# Patient Record
Sex: FEMALE | Race: WHITE | NOT HISPANIC OR LATINO | URBAN - METROPOLITAN AREA
[De-identification: names, ages, dates, MRNs, and addresses within clinical notes are randomized per-mention and may not be internally consistent; named-entity substitution may affect disease eponyms.]

---

## 2017-01-24 ENCOUNTER — EMERGENCY (EMERGENCY)
Facility: HOSPITAL | Age: 82
LOS: 1 days | Discharge: HOME | End: 2017-01-24
Admitting: EMERGENCY MEDICINE
Payer: MEDICARE

## 2017-01-24 DIAGNOSIS — K62.89 OTHER SPECIFIED DISEASES OF ANUS AND RECTUM: ICD-10-CM

## 2017-01-24 LAB
APPEARANCE UR: ABNORMAL
BACTERIA # UR AUTO: ABNORMAL
BILIRUB UR-MCNC: NEGATIVE — SIGNIFICANT CHANGE UP
COLOR SPEC: YELLOW — SIGNIFICANT CHANGE UP
DIFF PNL FLD: NEGATIVE — SIGNIFICANT CHANGE UP
GLUCOSE UR QL: NEGATIVE MG/DL — SIGNIFICANT CHANGE UP
KETONES UR-MCNC: ABNORMAL
LEUKOCYTE ESTERASE UR-ACNC: ABNORMAL
NITRITE UR-MCNC: POSITIVE
PH UR: 5 — SIGNIFICANT CHANGE UP (ref 4.8–8)
PROT UR-MCNC: 30 MG/DL
RBC CASTS # UR COMP ASSIST: NEGATIVE /HPF — SIGNIFICANT CHANGE UP (ref 0–4)
SP GR SPEC: 1.03 — HIGH (ref 1.01–1.02)
UROBILINOGEN FLD QL: 1 MG/DL
WBC UR QL: SIGNIFICANT CHANGE UP

## 2017-01-24 PROCEDURE — 74020: CPT | Mod: 26

## 2017-01-24 PROCEDURE — 74020: CPT

## 2017-01-24 PROCEDURE — 99283 EMERGENCY DEPT VISIT LOW MDM: CPT

## 2017-01-24 PROCEDURE — 99283 EMERGENCY DEPT VISIT LOW MDM: CPT | Mod: 25

## 2017-01-24 PROCEDURE — 51702 INSERT TEMP BLADDER CATH: CPT

## 2017-01-24 PROCEDURE — 81001 URINALYSIS AUTO W/SCOPE: CPT

## 2017-02-08 ENCOUNTER — APPOINTMENT (OUTPATIENT)
Dept: SURGERY | Facility: CLINIC | Age: 82
End: 2017-02-08

## 2017-02-08 VITALS
HEART RATE: 91 BPM | HEIGHT: 65 IN | SYSTOLIC BLOOD PRESSURE: 132 MMHG | WEIGHT: 125 LBS | DIASTOLIC BLOOD PRESSURE: 77 MMHG | BODY MASS INDEX: 20.83 KG/M2

## 2017-02-08 DIAGNOSIS — C50.911 MALIGNANT NEOPLASM OF UNSPECIFIED SITE OF RIGHT FEMALE BREAST: ICD-10-CM

## 2017-02-08 DIAGNOSIS — G30.9 ALZHEIMER'S DISEASE, UNSPECIFIED: ICD-10-CM

## 2017-02-08 DIAGNOSIS — Z87.39 PERSONAL HISTORY OF OTHER DISEASES OF THE MUSCULOSKELETAL SYSTEM AND CONNECTIVE TISSUE: ICD-10-CM

## 2017-02-08 DIAGNOSIS — Z86.39 PERSONAL HISTORY OF OTHER ENDOCRINE, NUTRITIONAL AND METABOLIC DISEASE: ICD-10-CM

## 2017-02-08 DIAGNOSIS — Z86.79 PERSONAL HISTORY OF OTHER DISEASES OF THE CIRCULATORY SYSTEM: ICD-10-CM

## 2017-02-08 DIAGNOSIS — Z80.7 FAMILY HISTORY OF OTHER MALIGNANT NEOPLASMS OF LYMPHOID, HEMATOPOIETIC AND RELATED TISSUES: ICD-10-CM

## 2017-02-08 RX ORDER — LOSARTAN POTASSIUM 50 MG/1
50 TABLET, FILM COATED ORAL
Refills: 0 | Status: ACTIVE | COMMUNITY

## 2017-02-08 RX ORDER — ERGOCALCIFEROL 1.25 MG/1
CAPSULE ORAL
Refills: 0 | Status: ACTIVE | COMMUNITY

## 2017-02-08 RX ORDER — SPIRONOLACTONE 25 MG/1
25 TABLET ORAL
Refills: 0 | Status: ACTIVE | COMMUNITY

## 2017-02-08 RX ORDER — DONEPEZIL HYDROCHLORIDE 5 MG/1
5 TABLET ORAL
Refills: 0 | Status: ACTIVE | COMMUNITY

## 2017-02-08 RX ORDER — ASPIRIN 81 MG
81 TABLET, DELAYED RELEASE (ENTERIC COATED) ORAL
Refills: 0 | Status: ACTIVE | COMMUNITY

## 2017-02-08 RX ORDER — LETROZOLE TABLETS 2.5 MG/1
2.5 TABLET, FILM COATED ORAL
Refills: 0 | Status: ACTIVE | COMMUNITY

## 2017-02-08 RX ORDER — MEMANTINE HYDROCHLORIDE 28 MG/1
28 CAPSULE, EXTENDED RELEASE ORAL
Refills: 0 | Status: ACTIVE | COMMUNITY

## 2017-02-08 RX ORDER — BUPROPION HYDROCHLORIDE 300 MG/1
300 TABLET, EXTENDED RELEASE ORAL
Refills: 0 | Status: ACTIVE | COMMUNITY

## 2017-02-15 ENCOUNTER — EMERGENCY (EMERGENCY)
Facility: HOSPITAL | Age: 82
LOS: 1 days | End: 2017-02-15
Admitting: EMERGENCY MEDICINE
Payer: MEDICARE

## 2017-02-15 PROCEDURE — 99282 EMERGENCY DEPT VISIT SF MDM: CPT | Mod: 25

## 2017-02-15 PROCEDURE — 99282 EMERGENCY DEPT VISIT SF MDM: CPT

## 2017-02-20 ENCOUNTER — FORM ENCOUNTER (OUTPATIENT)
Age: 82
End: 2017-02-20

## 2017-02-21 ENCOUNTER — APPOINTMENT (OUTPATIENT)
Dept: CT IMAGING | Facility: IMAGING CENTER | Age: 82
End: 2017-02-21

## 2017-02-21 ENCOUNTER — OUTPATIENT (OUTPATIENT)
Dept: OUTPATIENT SERVICES | Facility: HOSPITAL | Age: 82
LOS: 1 days | End: 2017-02-21
Payer: MEDICARE

## 2017-02-21 DIAGNOSIS — E21.0 PRIMARY HYPERPARATHYROIDISM: ICD-10-CM

## 2017-02-21 PROCEDURE — 70492 CT SFT TSUE NCK W/O & W/DYE: CPT

## 2017-02-21 PROCEDURE — 82565 ASSAY OF CREATININE: CPT

## 2017-02-23 ENCOUNTER — OUTPATIENT (OUTPATIENT)
Dept: OUTPATIENT SERVICES | Facility: HOSPITAL | Age: 82
LOS: 1 days | End: 2017-02-23

## 2017-02-23 VITALS
HEART RATE: 80 BPM | SYSTOLIC BLOOD PRESSURE: 124 MMHG | HEIGHT: 63.5 IN | RESPIRATION RATE: 18 BRPM | TEMPERATURE: 98 F | WEIGHT: 145.06 LBS | DIASTOLIC BLOOD PRESSURE: 76 MMHG

## 2017-02-23 DIAGNOSIS — I44.7 LEFT BUNDLE-BRANCH BLOCK, UNSPECIFIED: ICD-10-CM

## 2017-02-23 DIAGNOSIS — H26.9 UNSPECIFIED CATARACT: Chronic | ICD-10-CM

## 2017-02-23 DIAGNOSIS — E21.0 PRIMARY HYPERPARATHYROIDISM: ICD-10-CM

## 2017-02-23 DIAGNOSIS — G56.00 CARPAL TUNNEL SYNDROME, UNSPECIFIED UPPER LIMB: Chronic | ICD-10-CM

## 2017-02-23 DIAGNOSIS — Z90.11 ACQUIRED ABSENCE OF RIGHT BREAST AND NIPPLE: Chronic | ICD-10-CM

## 2017-02-23 DIAGNOSIS — Z01.818 ENCOUNTER FOR OTHER PREPROCEDURAL EXAMINATION: ICD-10-CM

## 2017-02-23 DIAGNOSIS — E21.3 HYPERPARATHYROIDISM, UNSPECIFIED: ICD-10-CM

## 2017-02-23 DIAGNOSIS — E03.9 HYPOTHYROIDISM, UNSPECIFIED: ICD-10-CM

## 2017-02-23 DIAGNOSIS — I10 ESSENTIAL (PRIMARY) HYPERTENSION: ICD-10-CM

## 2017-02-23 LAB
BASOPHILS # BLD AUTO: 0.06 K/UL — SIGNIFICANT CHANGE UP (ref 0–0.2)
BASOPHILS NFR BLD AUTO: 0.9 % — SIGNIFICANT CHANGE UP (ref 0–2)
BUN SERPL-MCNC: 19 MG/DL — SIGNIFICANT CHANGE UP (ref 7–23)
CALCIUM SERPL-MCNC: 11.7 MG/DL — HIGH (ref 8.4–10.5)
CHLORIDE SERPL-SCNC: 102 MMOL/L — SIGNIFICANT CHANGE UP (ref 98–107)
CO2 SERPL-SCNC: 25 MMOL/L — SIGNIFICANT CHANGE UP (ref 22–31)
CREAT SERPL-MCNC: 1.19 MG/DL — SIGNIFICANT CHANGE UP (ref 0.5–1.3)
EOSINOPHIL # BLD AUTO: 0.25 K/UL — SIGNIFICANT CHANGE UP (ref 0–0.5)
EOSINOPHIL NFR BLD AUTO: 3.7 % — SIGNIFICANT CHANGE UP (ref 0–6)
GLUCOSE SERPL-MCNC: 90 MG/DL — SIGNIFICANT CHANGE UP (ref 70–99)
HCT VFR BLD CALC: 41.3 % — SIGNIFICANT CHANGE UP (ref 34.5–45)
HGB BLD-MCNC: 13.4 G/DL — SIGNIFICANT CHANGE UP (ref 11.5–15.5)
IMM GRANULOCYTES NFR BLD AUTO: 0.4 % — SIGNIFICANT CHANGE UP (ref 0–1.5)
LYMPHOCYTES # BLD AUTO: 1.45 K/UL — SIGNIFICANT CHANGE UP (ref 1–3.3)
LYMPHOCYTES # BLD AUTO: 21.2 % — SIGNIFICANT CHANGE UP (ref 13–44)
MCHC RBC-ENTMCNC: 31.5 PG — SIGNIFICANT CHANGE UP (ref 27–34)
MCHC RBC-ENTMCNC: 32.4 % — SIGNIFICANT CHANGE UP (ref 32–36)
MCV RBC AUTO: 97.2 FL — SIGNIFICANT CHANGE UP (ref 80–100)
MONOCYTES # BLD AUTO: 0.64 K/UL — SIGNIFICANT CHANGE UP (ref 0–0.9)
MONOCYTES NFR BLD AUTO: 9.4 % — SIGNIFICANT CHANGE UP (ref 2–14)
NEUTROPHILS # BLD AUTO: 4.4 K/UL — SIGNIFICANT CHANGE UP (ref 1.8–7.4)
NEUTROPHILS NFR BLD AUTO: 64.4 % — SIGNIFICANT CHANGE UP (ref 43–77)
PLATELET # BLD AUTO: 370 K/UL — SIGNIFICANT CHANGE UP (ref 150–400)
PMV BLD: 9.3 FL — SIGNIFICANT CHANGE UP (ref 7–13)
POTASSIUM SERPL-MCNC: 4.2 MMOL/L — SIGNIFICANT CHANGE UP (ref 3.5–5.3)
POTASSIUM SERPL-SCNC: 4.2 MMOL/L — SIGNIFICANT CHANGE UP (ref 3.5–5.3)
RBC # BLD: 4.25 M/UL — SIGNIFICANT CHANGE UP (ref 3.8–5.2)
RBC # FLD: 12.8 % — SIGNIFICANT CHANGE UP (ref 10.3–14.5)
SODIUM SERPL-SCNC: 140 MMOL/L — SIGNIFICANT CHANGE UP (ref 135–145)
WBC # BLD: 6.83 K/UL — SIGNIFICANT CHANGE UP (ref 3.8–10.5)
WBC # FLD AUTO: 6.83 K/UL — SIGNIFICANT CHANGE UP (ref 3.8–10.5)

## 2017-02-23 NOTE — H&P PST ADULT - LYMPHATIC
supraclavicular R/supraclavicular L/anterior cervical L/posterior cervical R/anterior cervical R/posterior cervical L

## 2017-02-23 NOTE — H&P PST ADULT - NEGATIVE CARDIOVASCULAR SYMPTOMS
no dyspnea on exertion/no peripheral edema/no orthopnea/no paroxysmal nocturnal dyspnea/no claudication/no chest pain/no palpitations

## 2017-02-23 NOTE — H&P PST ADULT - NSANTHOSAYNRD_GEN_A_CORE
No. APOLINAR screening performed.  STOP BANG Legend: 0-2 = LOW Risk; 3-4 = INTERMEDIATE Risk; 5-8 = HIGH Risk

## 2017-02-23 NOTE — H&P PST ADULT - FAMILY HISTORY
<<-----Click on this checkbox to enter Family History Family history of breast cancer     Mother  Still living? No  Family history of lymphoma, Age at diagnosis: 31-40     Child  Still living? No  Family history of lymphoma, Age at diagnosis: 31-40     Father  Still living? No  Family history of heart disease, Age at diagnosis: Age Unknown

## 2017-02-23 NOTE — H&P PST ADULT - PROBLEM SELECTOR PLAN 2
New change denies symptomatology. contacted Dr Gold (cardiologist) office  Pt have appointment today for eval  Pending copy of cardiology eval New change denies symptomatology. contacted Dr Gold (cardiologist) office  Pt have appointment today for cardioloy eval  Pending copy of cardiology eval

## 2017-02-23 NOTE — H&P PST ADULT - PROBLEM SELECTOR PLAN 1
scheduled parathyroidectomy with pth assay on 3/2/2017.   preop instructions, gi ophylaxis & surgical soap given  pt verbalized understanding scheduled parathyroidectomy with pth assay on 3/2/2017.   preop instructions, gi prophylaxis & surgical soap given  pt verbalized understanding

## 2017-02-23 NOTE — H&P PST ADULT - PMH
CAD (coronary artery disease)    Dementia    Depression    Hypertension    Hypothyroidism    Malignant neoplasm of breast Dementia    Depression    Hyperparathyroidism    Hypertension    Hypothyroidism    LBBB (left bundle branch block)    Malignant neoplasm of breast Dementia    Depression    Hyperparathyroidism    Hypertension    Hypothyroidism    LBBB (left bundle branch block)    Malignant neoplasm of breast  h/o

## 2017-02-23 NOTE — H&P PST ADULT - HISTORY OF PRESENT ILLNESS
82y/o female presents for preop eval for scheduled parathyroidectomy with pth assay on 3/2/2017.  Pt states elevated serum calcium for past several months. 80y/o female presents for preop eval for scheduled parathyroidectomy with pth assay on 3/2/2017.  Pt states elevated serum calcium for past several months.  Preop  dx hyperparathyroidism.

## 2017-02-23 NOTE — H&P PST ADULT - PSH
Carpal tunnel syndrome  repair -right 2008  Cataract  b/l 2006  History of lumpectomy of right breast  2016

## 2017-03-01 ENCOUNTER — RESULT REVIEW (OUTPATIENT)
Age: 82
End: 2017-03-01

## 2017-03-02 ENCOUNTER — OUTPATIENT (OUTPATIENT)
Dept: OUTPATIENT SERVICES | Facility: HOSPITAL | Age: 82
LOS: 1 days | Discharge: HOME | End: 2017-03-02
Payer: MEDICARE

## 2017-03-02 ENCOUNTER — APPOINTMENT (OUTPATIENT)
Dept: SURGERY | Facility: HOSPITAL | Age: 82
End: 2017-03-02

## 2017-03-02 VITALS
RESPIRATION RATE: 20 BRPM | OXYGEN SATURATION: 95 % | TEMPERATURE: 97 F | DIASTOLIC BLOOD PRESSURE: 66 MMHG | SYSTOLIC BLOOD PRESSURE: 147 MMHG | HEART RATE: 64 BPM

## 2017-03-02 VITALS
OXYGEN SATURATION: 98 % | WEIGHT: 139.99 LBS | SYSTOLIC BLOOD PRESSURE: 143 MMHG | HEART RATE: 86 BPM | RESPIRATION RATE: 20 BRPM | TEMPERATURE: 97 F | DIASTOLIC BLOOD PRESSURE: 76 MMHG | HEIGHT: 64 IN

## 2017-03-02 DIAGNOSIS — G56.00 CARPAL TUNNEL SYNDROME, UNSPECIFIED UPPER LIMB: Chronic | ICD-10-CM

## 2017-03-02 DIAGNOSIS — Z90.11 ACQUIRED ABSENCE OF RIGHT BREAST AND NIPPLE: Chronic | ICD-10-CM

## 2017-03-02 DIAGNOSIS — E21.0 PRIMARY HYPERPARATHYROIDISM: ICD-10-CM

## 2017-03-02 DIAGNOSIS — H26.9 UNSPECIFIED CATARACT: Chronic | ICD-10-CM

## 2017-03-02 PROCEDURE — 88305 TISSUE EXAM BY PATHOLOGIST: CPT | Mod: 26

## 2017-03-02 PROCEDURE — 60500 EXPLORE PARATHYROID GLANDS: CPT | Mod: AS

## 2017-03-02 PROCEDURE — 13132 CMPLX RPR F/C/C/M/N/AX/G/H/F: CPT | Mod: 59

## 2017-03-02 PROCEDURE — 88331 PATH CONSLTJ SURG 1 BLK 1SPC: CPT | Mod: 26

## 2017-03-02 PROCEDURE — 60500 EXPLORE PARATHYROID GLANDS: CPT

## 2017-03-02 RX ORDER — SODIUM CHLORIDE 9 MG/ML
1000 INJECTION, SOLUTION INTRAVENOUS
Qty: 0 | Refills: 0 | Status: DISCONTINUED | OUTPATIENT
Start: 2017-03-02 | End: 2017-03-03

## 2017-03-02 RX ORDER — BENZOCAINE AND MENTHOL 5; 1 G/100ML; G/100ML
1 LIQUID ORAL
Qty: 0 | Refills: 0 | Status: DISCONTINUED | OUTPATIENT
Start: 2017-03-02 | End: 2017-03-03

## 2017-03-02 RX ORDER — ACETAMINOPHEN 500 MG
2 TABLET ORAL
Qty: 0 | Refills: 0 | COMMUNITY
Start: 2017-03-02

## 2017-03-02 RX ORDER — ONDANSETRON 8 MG/1
4 TABLET, FILM COATED ORAL ONCE
Qty: 0 | Refills: 0 | Status: COMPLETED | OUTPATIENT
Start: 2017-03-02 | End: 2017-03-02

## 2017-03-02 RX ORDER — BENZOCAINE AND MENTHOL 5; 1 G/100ML; G/100ML
1 LIQUID ORAL
Qty: 0 | Refills: 0 | COMMUNITY
Start: 2017-03-02

## 2017-03-02 RX ORDER — ACETAMINOPHEN 500 MG
650 TABLET ORAL EVERY 6 HOURS
Qty: 0 | Refills: 0 | Status: DISCONTINUED | OUTPATIENT
Start: 2017-03-02 | End: 2017-03-03

## 2017-03-02 RX ORDER — CALCIUM CARBONATE 500(1250)
2 TABLET ORAL ONCE
Qty: 0 | Refills: 0 | Status: COMPLETED | OUTPATIENT
Start: 2017-03-02 | End: 2017-03-02

## 2017-03-02 RX ADMIN — ONDANSETRON 4 MILLIGRAM(S): 8 TABLET, FILM COATED ORAL at 17:04

## 2017-03-02 RX ADMIN — SODIUM CHLORIDE 60 MILLILITER(S): 9 INJECTION, SOLUTION INTRAVENOUS at 15:46

## 2017-03-02 RX ADMIN — BENZOCAINE AND MENTHOL 1 LOZENGE: 5; 1 LIQUID ORAL at 15:46

## 2017-03-02 RX ADMIN — Medication 2 TABLET(S): at 15:46

## 2017-03-02 NOTE — ASU DISCHARGE PLAN (ADULT/PEDIATRIC). - MEDICATION SUMMARY - MEDICATIONS TO TAKE
I will START or STAY ON the medications listed below when I get home from the hospital:    spironolactone 25 mg oral tablet  -- 1 tab(s) by mouth once a day  -- Indication: For home    acetaminophen 325 mg oral tablet  -- 2 tab(s) by mouth every 6 hours, As needed, Moderate Pain (4 - 6)  -- Indication: For Pain    aspirin 81 mg oral delayed release capsule  --  by mouth once a day. Last dose 2/23/17  -- Indication: For home    losartan 50 mg oral tablet  -- 1 tab(s) by mouth once a day. PM  -- Indication: For home    letrozole 2.5 mg oral tablet  -- 1 tab(s) by mouth once a day  -- Indication: For home    donepezil 10 mg oral tablet  -- 1 tab(s) by mouth once a day (at bedtime)  -- Indication: For home    Namenda XR 28 mg oral capsule, extended release  -- 1 cap(s) by mouth once a day. AM  -- Indication: For home    benzocaine-menthol 15 mg-3.6 mg mucous membrane lozenge  -- 1  mucous membrane every 4 hours, As Needed  -- Indication: For Pain    buPROPion 300 mg/24 hours (XL) oral tablet, extended release  -- 1 tab(s) by mouth every 24 hours. AM  -- Indication: For home    Synthroid 112 mcg (0.112 mg) oral tablet  -- 1 tab(s) by mouth 5 times per week.  -- Indication: For home    calcium-vitamin D 500 mg-200 intl units oral tablet  -- 2 tab(s) by mouth 3 times a day  -- Indication: For Postop    Vitamin D3 1000 intl units oral tablet  -- 3 tab(s) by mouth once a day  -- Indication: For home

## 2017-03-02 NOTE — ASU DISCHARGE PLAN (ADULT/PEDIATRIC). - SPECIAL INSTRUCTIONS
Call MD for any neck swelling, any shortness of breath, or any redness/drainage from wound. Stay away from hot, spicy and jagged edged foods.  Call MD for any nasal tip, fingertip or extremity numbness/tingling. Take medications as directed.

## 2017-03-02 NOTE — ASU DISCHARGE PLAN (ADULT/PEDIATRIC). - INSTRUCTIONS
3/8/17 call for time Cool and warm liquids that are not irritating to the throat should be given for the first day. Avoid hot liquids. Avoid citrus juices and milk. Advance at your own pace starting with soft foods and advancing to a regular diet. Avoid rough and scratchy foods and foods that are difficult to chew.

## 2017-03-02 NOTE — ASU DISCHARGE PLAN (ADULT/PEDIATRIC). - NOTIFY
Bleeding that does not stop/Fever greater than 101 Inability to Tolerate Liquids or Foods/Persistent Nausea and Vomiting/Bleeding that does not stop/Fever greater than 101

## 2017-03-07 ENCOUNTER — TRANSCRIPTION ENCOUNTER (OUTPATIENT)
Age: 82
End: 2017-03-07

## 2017-03-07 LAB — SURGICAL PATHOLOGY STUDY: SIGNIFICANT CHANGE UP

## 2017-03-22 ENCOUNTER — APPOINTMENT (OUTPATIENT)
Dept: SURGERY | Facility: CLINIC | Age: 82
End: 2017-03-22

## 2017-03-24 LAB
25(OH)D3 SERPL-MCNC: 21.6 NG/ML
CALCIUM SERPL-MCNC: 10.2 MG/DL
CALCIUM SERPL-MCNC: 10.2 MG/DL
PARATHYROID HORMONE INTACT: 43 PG/ML

## 2017-05-10 ENCOUNTER — APPOINTMENT (OUTPATIENT)
Dept: SURGERY | Facility: CLINIC | Age: 82
End: 2017-05-10

## 2017-05-10 RX ORDER — DONEPEZIL HYDROCHLORIDE 10 MG/1
10 TABLET ORAL
Qty: 30 | Refills: 0 | Status: ACTIVE | COMMUNITY
Start: 2016-10-10

## 2017-05-10 RX ORDER — LEVOTHYROXINE SODIUM 100 UG/1
100 TABLET ORAL
Qty: 90 | Refills: 0 | Status: ACTIVE | COMMUNITY
Start: 2017-03-30

## 2017-05-10 RX ORDER — BUPROPION HYDROCHLORIDE 150 MG/1
150 TABLET, EXTENDED RELEASE ORAL
Qty: 30 | Refills: 0 | Status: ACTIVE | COMMUNITY
Start: 2017-04-14

## 2017-05-10 RX ORDER — DONEPEZIL HYDROCHLORIDE 23 MG/1
23 TABLET, FILM COATED ORAL
Qty: 30 | Refills: 0 | Status: ACTIVE | COMMUNITY
Start: 2017-03-23

## 2017-05-24 LAB
25(OH)D3 SERPL-MCNC: 27.8 NG/ML
CALCIUM SERPL-MCNC: 10.3 MG/DL
CALCIUM SERPL-MCNC: 10.5 MG/DL
PARATHYROID HORMONE INTACT: 57 PG/ML
T3 SERPL-MCNC: 95 NG/DL
T4 FREE SERPL-MCNC: 1.7 NG/DL
TSH SERPL-ACNC: 1.24 UIU/ML

## 2017-09-20 ENCOUNTER — APPOINTMENT (OUTPATIENT)
Dept: SURGERY | Facility: CLINIC | Age: 82
End: 2017-09-20

## 2017-10-25 ENCOUNTER — APPOINTMENT (OUTPATIENT)
Dept: SURGERY | Facility: CLINIC | Age: 82
End: 2017-10-25
Payer: MEDICARE

## 2017-10-25 DIAGNOSIS — E21.0 PRIMARY HYPERPARATHYROIDISM: ICD-10-CM

## 2017-10-25 PROCEDURE — 36415 COLL VENOUS BLD VENIPUNCTURE: CPT

## 2017-10-25 PROCEDURE — 99213 OFFICE O/P EST LOW 20 MIN: CPT | Mod: 25

## 2017-10-25 RX ORDER — LEVOTHYROXINE SODIUM 112 UG/1
112 TABLET ORAL
Refills: 0 | Status: DISCONTINUED | COMMUNITY
End: 2017-10-25

## 2017-10-31 LAB
25(OH)D3 SERPL-MCNC: 19 NG/ML
CALCIUM SERPL-MCNC: 9.8 MG/DL
CALCIUM SERPL-MCNC: 9.8 MG/DL
PARATHYROID HORMONE INTACT: 50 PG/ML

## 2017-11-11 ENCOUNTER — EMERGENCY (EMERGENCY)
Facility: HOSPITAL | Age: 82
LOS: 1 days | Discharge: HOME | End: 2017-11-11
Attending: EMERGENCY MEDICINE | Admitting: EMERGENCY MEDICINE
Payer: MEDICARE

## 2017-11-11 VITALS
RESPIRATION RATE: 15 BRPM | TEMPERATURE: 98 F | SYSTOLIC BLOOD PRESSURE: 148 MMHG | DIASTOLIC BLOOD PRESSURE: 81 MMHG | HEART RATE: 81 BPM | OXYGEN SATURATION: 96 %

## 2017-11-11 VITALS — HEIGHT: 65 IN | WEIGHT: 134.92 LBS

## 2017-11-11 DIAGNOSIS — Z90.11 ACQUIRED ABSENCE OF RIGHT BREAST AND NIPPLE: Chronic | ICD-10-CM

## 2017-11-11 DIAGNOSIS — H26.9 UNSPECIFIED CATARACT: Chronic | ICD-10-CM

## 2017-11-11 DIAGNOSIS — G56.00 CARPAL TUNNEL SYNDROME, UNSPECIFIED UPPER LIMB: Chronic | ICD-10-CM

## 2017-11-11 LAB
ALBUMIN SERPL ELPH-MCNC: 3.2 G/DL — LOW (ref 3.3–5)
ALP SERPL-CCNC: 74 U/L — SIGNIFICANT CHANGE UP (ref 30–120)
ALT FLD-CCNC: 19 U/L DA — SIGNIFICANT CHANGE UP (ref 10–60)
ANION GAP SERPL CALC-SCNC: 6 MMOL/L — SIGNIFICANT CHANGE UP (ref 5–17)
APPEARANCE UR: CLEAR — SIGNIFICANT CHANGE UP
APTT BLD: 30.5 SEC — SIGNIFICANT CHANGE UP (ref 27.5–37.4)
AST SERPL-CCNC: 15 U/L — SIGNIFICANT CHANGE UP (ref 10–40)
BACTERIA # UR AUTO: ABNORMAL
BASOPHILS # BLD AUTO: 0.1 K/UL — SIGNIFICANT CHANGE UP (ref 0–0.2)
BASOPHILS NFR BLD AUTO: 1 % — SIGNIFICANT CHANGE UP (ref 0–2)
BILIRUB SERPL-MCNC: 0.4 MG/DL — SIGNIFICANT CHANGE UP (ref 0.2–1.2)
BILIRUB UR-MCNC: NEGATIVE — SIGNIFICANT CHANGE UP
BLD GP AB SCN SERPL QL: SIGNIFICANT CHANGE UP
BUN SERPL-MCNC: 20 MG/DL — SIGNIFICANT CHANGE UP (ref 7–23)
CALCIUM SERPL-MCNC: 8.9 MG/DL — SIGNIFICANT CHANGE UP (ref 8.4–10.5)
CHLORIDE SERPL-SCNC: 103 MMOL/L — SIGNIFICANT CHANGE UP (ref 96–108)
CO2 SERPL-SCNC: 29 MMOL/L — SIGNIFICANT CHANGE UP (ref 22–31)
COLOR SPEC: YELLOW — SIGNIFICANT CHANGE UP
CREAT SERPL-MCNC: 1.01 MG/DL — SIGNIFICANT CHANGE UP (ref 0.5–1.3)
DIFF PNL FLD: NEGATIVE — SIGNIFICANT CHANGE UP
EOSINOPHIL # BLD AUTO: 0.1 K/UL — SIGNIFICANT CHANGE UP (ref 0–0.5)
EOSINOPHIL NFR BLD AUTO: 0.9 % — SIGNIFICANT CHANGE UP (ref 0–6)
EPI CELLS # UR: SIGNIFICANT CHANGE UP
GLUCOSE SERPL-MCNC: 107 MG/DL — HIGH (ref 70–99)
GLUCOSE UR QL: NEGATIVE MG/DL — SIGNIFICANT CHANGE UP
HCT VFR BLD CALC: 36.9 % — SIGNIFICANT CHANGE UP (ref 34.5–45)
HGB BLD-MCNC: 11.8 G/DL — SIGNIFICANT CHANGE UP (ref 11.5–15.5)
INR BLD: 1.08 RATIO — SIGNIFICANT CHANGE UP (ref 0.88–1.16)
KETONES UR-MCNC: NEGATIVE — SIGNIFICANT CHANGE UP
LEUKOCYTE ESTERASE UR-ACNC: ABNORMAL
LYMPHOCYTES # BLD AUTO: 1.6 K/UL — SIGNIFICANT CHANGE UP (ref 1–3.3)
LYMPHOCYTES # BLD AUTO: 15.6 % — SIGNIFICANT CHANGE UP (ref 13–44)
MCHC RBC-ENTMCNC: 31.8 GM/DL — LOW (ref 32–36)
MCHC RBC-ENTMCNC: 31.8 PG — SIGNIFICANT CHANGE UP (ref 27–34)
MCV RBC AUTO: 99.8 FL — SIGNIFICANT CHANGE UP (ref 80–100)
MONOCYTES # BLD AUTO: 1.2 K/UL — HIGH (ref 0–0.9)
MONOCYTES NFR BLD AUTO: 12.1 % — SIGNIFICANT CHANGE UP (ref 2–14)
NEUTROPHILS # BLD AUTO: 7.1 K/UL — SIGNIFICANT CHANGE UP (ref 1.8–7.4)
NEUTROPHILS NFR BLD AUTO: 70.4 % — SIGNIFICANT CHANGE UP (ref 43–77)
NITRITE UR-MCNC: NEGATIVE — SIGNIFICANT CHANGE UP
PH UR: 8 — SIGNIFICANT CHANGE UP (ref 5–8)
PLATELET # BLD AUTO: 369 K/UL — SIGNIFICANT CHANGE UP (ref 150–400)
POTASSIUM SERPL-MCNC: 4 MMOL/L — SIGNIFICANT CHANGE UP (ref 3.5–5.3)
POTASSIUM SERPL-SCNC: 4 MMOL/L — SIGNIFICANT CHANGE UP (ref 3.5–5.3)
PROT SERPL-MCNC: 7.1 G/DL — SIGNIFICANT CHANGE UP (ref 6–8.3)
PROT UR-MCNC: NEGATIVE MG/DL — SIGNIFICANT CHANGE UP
PROTHROM AB SERPL-ACNC: 11.8 SEC — SIGNIFICANT CHANGE UP (ref 9.8–12.7)
RBC # BLD: 3.7 M/UL — LOW (ref 3.8–5.2)
RBC # FLD: 12.3 % — SIGNIFICANT CHANGE UP (ref 10.3–14.5)
RBC CASTS # UR COMP ASSIST: SIGNIFICANT CHANGE UP /HPF (ref 0–4)
SODIUM SERPL-SCNC: 138 MMOL/L — SIGNIFICANT CHANGE UP (ref 135–145)
SP GR SPEC: 1.01 — SIGNIFICANT CHANGE UP (ref 1.01–1.02)
UROBILINOGEN FLD QL: NEGATIVE MG/DL — SIGNIFICANT CHANGE UP
WBC # BLD: 10.2 K/UL — SIGNIFICANT CHANGE UP (ref 3.8–10.5)
WBC # FLD AUTO: 10.2 K/UL — SIGNIFICANT CHANGE UP (ref 3.8–10.5)
WBC UR QL: SIGNIFICANT CHANGE UP

## 2017-11-11 PROCEDURE — 80053 COMPREHEN METABOLIC PANEL: CPT

## 2017-11-11 PROCEDURE — 86901 BLOOD TYPING SEROLOGIC RH(D): CPT

## 2017-11-11 PROCEDURE — 85610 PROTHROMBIN TIME: CPT

## 2017-11-11 PROCEDURE — 85027 COMPLETE CBC AUTOMATED: CPT

## 2017-11-11 PROCEDURE — 85730 THROMBOPLASTIN TIME PARTIAL: CPT

## 2017-11-11 PROCEDURE — 86850 RBC ANTIBODY SCREEN: CPT

## 2017-11-11 PROCEDURE — 87086 URINE CULTURE/COLONY COUNT: CPT

## 2017-11-11 PROCEDURE — 99285 EMERGENCY DEPT VISIT HI MDM: CPT

## 2017-11-11 PROCEDURE — 99284 EMERGENCY DEPT VISIT MOD MDM: CPT | Mod: 25

## 2017-11-11 PROCEDURE — 74176 CT ABD & PELVIS W/O CONTRAST: CPT

## 2017-11-11 PROCEDURE — 74176 CT ABD & PELVIS W/O CONTRAST: CPT | Mod: 26

## 2017-11-11 PROCEDURE — 96374 THER/PROPH/DIAG INJ IV PUSH: CPT

## 2017-11-11 PROCEDURE — 86900 BLOOD TYPING SEROLOGIC ABO: CPT

## 2017-11-11 PROCEDURE — 81001 URINALYSIS AUTO W/SCOPE: CPT

## 2017-11-11 RX ORDER — ASPIRIN/CALCIUM CARB/MAGNESIUM 324 MG
0 TABLET ORAL
Qty: 0 | Refills: 0 | COMMUNITY

## 2017-11-11 RX ORDER — KETOROLAC TROMETHAMINE 30 MG/ML
15 SYRINGE (ML) INJECTION ONCE
Qty: 0 | Refills: 0 | Status: DISCONTINUED | OUTPATIENT
Start: 2017-11-11 | End: 2017-11-11

## 2017-11-11 RX ORDER — LOSARTAN POTASSIUM 100 MG/1
1 TABLET, FILM COATED ORAL
Qty: 0 | Refills: 0 | COMMUNITY

## 2017-11-11 RX ORDER — BUPROPION HYDROCHLORIDE 150 MG/1
1 TABLET, EXTENDED RELEASE ORAL
Qty: 0 | Refills: 0 | COMMUNITY

## 2017-11-11 RX ORDER — SPIRONOLACTONE 25 MG/1
1 TABLET, FILM COATED ORAL
Qty: 0 | Refills: 0 | COMMUNITY

## 2017-11-11 RX ORDER — DONEPEZIL HYDROCHLORIDE 10 MG/1
1 TABLET, FILM COATED ORAL
Qty: 0 | Refills: 0 | COMMUNITY

## 2017-11-11 RX ORDER — LEVOTHYROXINE SODIUM 125 MCG
1 TABLET ORAL
Qty: 0 | Refills: 0 | COMMUNITY

## 2017-11-11 RX ORDER — CHOLECALCIFEROL (VITAMIN D3) 125 MCG
3 CAPSULE ORAL
Qty: 0 | Refills: 0 | COMMUNITY

## 2017-11-11 RX ADMIN — Medication 15 MILLIGRAM(S): at 15:29

## 2017-11-11 RX ADMIN — Medication 15 MILLIGRAM(S): at 15:15

## 2017-11-11 NOTE — ED ADULT NURSE NOTE - CHPI ED SYMPTOMS NEG
no motor function loss/no numbness/no difficulty bearing weight/no anorexia/no constipation/no bowel dysfunction/no bladder dysfunction/no tingling

## 2017-11-11 NOTE — ED PROVIDER NOTE - CONSTITUTIONAL, MLM
normal... Well appearing, well nourished, awake, alert, oriented to person and place only and in no apparent distress.

## 2017-11-11 NOTE — ED PROVIDER NOTE - GASTROINTESTINAL, MLM
Abdomen soft, but tender diffusely to palpation. No guarding or rebound. No CVA tenderness. Palpable, pulsatile aorta approx. 4cm in diameter.

## 2017-11-11 NOTE — ED ADULT NURSE NOTE - OBJECTIVE STATEMENT
82 yr old female c/o of lower back pain for a couple of days. Patient states she gets relief with tylenol.

## 2017-11-11 NOTE — ED PROVIDER NOTE - OBJECTIVE STATEMENT
81 y/o F pt w/ PMHx HTN, dementia, presents to the ED BIB son c/o non radiating back pain x couple days. Pt is a poor historian, hx received from son. Son states the pt went to see her PMD Dr. Ramos 3 days ago with home health aide for c/o back pain, XR was completed which revealed "arthritis and compression on discs". Pt's son does not have a print out of report. Pt's son states that during night the pt started having increasing discomfort, was brought to ED for further evaluation. Denies trauma/fall, urinary symptoms, neck pain, bladder or bowel incontinence or any other complaints at this time. NKDA    neuropsych: dr. mercado [phone]

## 2017-11-11 NOTE — ED PROVIDER NOTE - MEDICAL DECISION MAKING DETAILS
Elderly female with dementia and HTN presents with 1 week abdominal and back pain. Plan-  r/o intraabdominal vascular, orthopedic causes.

## 2017-11-11 NOTE — ED PROVIDER NOTE - EYES, MLM
Clear bilaterally, pupils equal, round and reactive to light. Post cataract surgical changes, exophthalmos

## 2017-11-11 NOTE — ED PROVIDER NOTE - PROGRESS NOTE DETAILS
son requests no pain med until discussion with neuropsych dr Otoole in [facility] [phone]- called, as pt may benefit from pain meds for comfort- concerned for increased agitation and confusion.  will call back after ct and labs to discuss further I had an extensive dw with family at bedside. daughter now at bedside recalls pt underwent epidural steroid injectcions up to 8 years ago for pain and has not had since.  will hold off on any opiate or controlled substance start low dose anti-inflammatory and referral to pain mgt

## 2017-11-11 NOTE — ED PROVIDER NOTE - PMH
Dementia    Depression    Hyperparathyroidism    Hypertension    Hypothyroidism    LBBB (left bundle branch block)    Malignant neoplasm of breast  h/o

## 2017-11-12 LAB
CULTURE RESULTS: NO GROWTH — SIGNIFICANT CHANGE UP
SPECIMEN SOURCE: SIGNIFICANT CHANGE UP

## 2018-06-19 ENCOUNTER — EMERGENCY (EMERGENCY)
Facility: HOSPITAL | Age: 83
LOS: 1 days | Discharge: HOME | End: 2018-06-19
Attending: EMERGENCY MEDICINE | Admitting: EMERGENCY MEDICINE
Payer: MEDICARE

## 2018-06-19 VITALS
HEART RATE: 72 BPM | SYSTOLIC BLOOD PRESSURE: 137 MMHG | RESPIRATION RATE: 16 BRPM | DIASTOLIC BLOOD PRESSURE: 65 MMHG | OXYGEN SATURATION: 96 % | TEMPERATURE: 98 F

## 2018-06-19 VITALS
HEART RATE: 77 BPM | RESPIRATION RATE: 16 BRPM | SYSTOLIC BLOOD PRESSURE: 143 MMHG | WEIGHT: 145.06 LBS | TEMPERATURE: 99 F | OXYGEN SATURATION: 96 % | HEIGHT: 65 IN | DIASTOLIC BLOOD PRESSURE: 77 MMHG

## 2018-06-19 DIAGNOSIS — Z90.11 ACQUIRED ABSENCE OF RIGHT BREAST AND NIPPLE: Chronic | ICD-10-CM

## 2018-06-19 DIAGNOSIS — G56.00 CARPAL TUNNEL SYNDROME, UNSPECIFIED UPPER LIMB: Chronic | ICD-10-CM

## 2018-06-19 DIAGNOSIS — H26.9 UNSPECIFIED CATARACT: Chronic | ICD-10-CM

## 2018-06-19 LAB
ALBUMIN SERPL ELPH-MCNC: 3.2 G/DL — LOW (ref 3.3–5)
ALP SERPL-CCNC: 74 U/L — SIGNIFICANT CHANGE UP (ref 30–120)
ALT FLD-CCNC: 19 U/L DA — SIGNIFICANT CHANGE UP (ref 10–60)
ANION GAP SERPL CALC-SCNC: 7 MMOL/L — SIGNIFICANT CHANGE UP (ref 5–17)
APPEARANCE UR: CLEAR — SIGNIFICANT CHANGE UP
APTT BLD: 28.8 SEC — SIGNIFICANT CHANGE UP (ref 27.5–37.4)
AST SERPL-CCNC: 17 U/L — SIGNIFICANT CHANGE UP (ref 10–40)
BASOPHILS # BLD AUTO: 0.1 K/UL — SIGNIFICANT CHANGE UP (ref 0–0.2)
BASOPHILS NFR BLD AUTO: 1.3 % — SIGNIFICANT CHANGE UP (ref 0–2)
BILIRUB SERPL-MCNC: 0.3 MG/DL — SIGNIFICANT CHANGE UP (ref 0.2–1.2)
BILIRUB UR-MCNC: NEGATIVE — SIGNIFICANT CHANGE UP
BUN SERPL-MCNC: 24 MG/DL — HIGH (ref 7–23)
CALCIUM SERPL-MCNC: 8.3 MG/DL — LOW (ref 8.4–10.5)
CHLORIDE SERPL-SCNC: 103 MMOL/L — SIGNIFICANT CHANGE UP (ref 96–108)
CO2 SERPL-SCNC: 29 MMOL/L — SIGNIFICANT CHANGE UP (ref 22–31)
COLOR SPEC: YELLOW — SIGNIFICANT CHANGE UP
CREAT SERPL-MCNC: 1.15 MG/DL — SIGNIFICANT CHANGE UP (ref 0.5–1.3)
DIFF PNL FLD: NEGATIVE — SIGNIFICANT CHANGE UP
EOSINOPHIL # BLD AUTO: 0.1 K/UL — SIGNIFICANT CHANGE UP (ref 0–0.5)
EOSINOPHIL NFR BLD AUTO: 2.4 % — SIGNIFICANT CHANGE UP (ref 0–6)
GLUCOSE SERPL-MCNC: 105 MG/DL — HIGH (ref 70–99)
GLUCOSE UR QL: NEGATIVE MG/DL — SIGNIFICANT CHANGE UP
HCT VFR BLD CALC: 37.4 % — SIGNIFICANT CHANGE UP (ref 34.5–45)
HGB BLD-MCNC: 12.9 G/DL — SIGNIFICANT CHANGE UP (ref 11.5–15.5)
INR BLD: 1.05 RATIO — SIGNIFICANT CHANGE UP (ref 0.88–1.16)
KETONES UR-MCNC: ABNORMAL
LEUKOCYTE ESTERASE UR-ACNC: ABNORMAL
LIDOCAIN IGE QN: 90 U/L — SIGNIFICANT CHANGE UP (ref 73–393)
LYMPHOCYTES # BLD AUTO: 0.8 K/UL — LOW (ref 1–3.3)
LYMPHOCYTES # BLD AUTO: 17.8 % — SIGNIFICANT CHANGE UP (ref 13–44)
MCHC RBC-ENTMCNC: 32.8 PG — SIGNIFICANT CHANGE UP (ref 27–34)
MCHC RBC-ENTMCNC: 34.5 GM/DL — SIGNIFICANT CHANGE UP (ref 32–36)
MCV RBC AUTO: 95.2 FL — SIGNIFICANT CHANGE UP (ref 80–100)
MONOCYTES # BLD AUTO: 0.6 K/UL — SIGNIFICANT CHANGE UP (ref 0–0.9)
MONOCYTES NFR BLD AUTO: 12 % — SIGNIFICANT CHANGE UP (ref 2–14)
NEUTROPHILS # BLD AUTO: 3.1 K/UL — SIGNIFICANT CHANGE UP (ref 1.8–7.4)
NEUTROPHILS NFR BLD AUTO: 66.5 % — SIGNIFICANT CHANGE UP (ref 43–77)
NITRITE UR-MCNC: NEGATIVE — SIGNIFICANT CHANGE UP
PH UR: 6 — SIGNIFICANT CHANGE UP (ref 5–8)
PLATELET # BLD AUTO: 325 K/UL — SIGNIFICANT CHANGE UP (ref 150–400)
POTASSIUM SERPL-MCNC: 4 MMOL/L — SIGNIFICANT CHANGE UP (ref 3.5–5.3)
POTASSIUM SERPL-SCNC: 4 MMOL/L — SIGNIFICANT CHANGE UP (ref 3.5–5.3)
PROT SERPL-MCNC: 6.6 G/DL — SIGNIFICANT CHANGE UP (ref 6–8.3)
PROT UR-MCNC: 15 MG/DL
PROTHROM AB SERPL-ACNC: 11.5 SEC — SIGNIFICANT CHANGE UP (ref 9.8–12.7)
RBC # BLD: 3.93 M/UL — SIGNIFICANT CHANGE UP (ref 3.8–5.2)
RBC # FLD: 12.1 % — SIGNIFICANT CHANGE UP (ref 10.3–14.5)
SODIUM SERPL-SCNC: 139 MMOL/L — SIGNIFICANT CHANGE UP (ref 135–145)
SP GR SPEC: 1.02 — SIGNIFICANT CHANGE UP (ref 1.01–1.02)
UROBILINOGEN FLD QL: 1 MG/DL
WBC # BLD: 4.7 K/UL — SIGNIFICANT CHANGE UP (ref 3.8–10.5)
WBC # FLD AUTO: 4.7 K/UL — SIGNIFICANT CHANGE UP (ref 3.8–10.5)

## 2018-06-19 PROCEDURE — 83690 ASSAY OF LIPASE: CPT

## 2018-06-19 PROCEDURE — 99285 EMERGENCY DEPT VISIT HI MDM: CPT

## 2018-06-19 PROCEDURE — 99284 EMERGENCY DEPT VISIT MOD MDM: CPT | Mod: 25

## 2018-06-19 PROCEDURE — 93010 ELECTROCARDIOGRAM REPORT: CPT

## 2018-06-19 PROCEDURE — 96374 THER/PROPH/DIAG INJ IV PUSH: CPT | Mod: 59

## 2018-06-19 PROCEDURE — 81001 URINALYSIS AUTO W/SCOPE: CPT

## 2018-06-19 PROCEDURE — 80053 COMPREHEN METABOLIC PANEL: CPT

## 2018-06-19 PROCEDURE — 85730 THROMBOPLASTIN TIME PARTIAL: CPT

## 2018-06-19 PROCEDURE — 93005 ELECTROCARDIOGRAM TRACING: CPT

## 2018-06-19 PROCEDURE — 85027 COMPLETE CBC AUTOMATED: CPT

## 2018-06-19 PROCEDURE — 85610 PROTHROMBIN TIME: CPT

## 2018-06-19 PROCEDURE — 74177 CT ABD & PELVIS W/CONTRAST: CPT | Mod: 26

## 2018-06-19 PROCEDURE — 74177 CT ABD & PELVIS W/CONTRAST: CPT

## 2018-06-19 RX ORDER — IOHEXOL 300 MG/ML
30 INJECTION, SOLUTION INTRAVENOUS ONCE
Qty: 0 | Refills: 0 | Status: COMPLETED | OUTPATIENT
Start: 2018-06-19 | End: 2018-06-19

## 2018-06-19 RX ORDER — SODIUM CHLORIDE 9 MG/ML
3 INJECTION INTRAMUSCULAR; INTRAVENOUS; SUBCUTANEOUS ONCE
Qty: 0 | Refills: 0 | Status: COMPLETED | OUTPATIENT
Start: 2018-06-19 | End: 2018-06-19

## 2018-06-19 RX ORDER — ONDANSETRON 8 MG/1
4 TABLET, FILM COATED ORAL ONCE
Qty: 0 | Refills: 0 | Status: COMPLETED | OUTPATIENT
Start: 2018-06-19 | End: 2018-06-19

## 2018-06-19 RX ORDER — SODIUM CHLORIDE 9 MG/ML
1000 INJECTION INTRAMUSCULAR; INTRAVENOUS; SUBCUTANEOUS ONCE
Qty: 0 | Refills: 0 | Status: COMPLETED | OUTPATIENT
Start: 2018-06-19 | End: 2018-06-19

## 2018-06-19 RX ADMIN — SODIUM CHLORIDE 3 MILLILITER(S): 9 INJECTION INTRAMUSCULAR; INTRAVENOUS; SUBCUTANEOUS at 10:43

## 2018-06-19 RX ADMIN — IOHEXOL 30 MILLILITER(S): 300 INJECTION, SOLUTION INTRAVENOUS at 10:42

## 2018-06-19 RX ADMIN — ONDANSETRON 4 MILLIGRAM(S): 8 TABLET, FILM COATED ORAL at 10:42

## 2018-06-19 RX ADMIN — SODIUM CHLORIDE 1000 MILLILITER(S): 9 INJECTION INTRAMUSCULAR; INTRAVENOUS; SUBCUTANEOUS at 10:43

## 2018-06-19 NOTE — ED PROVIDER NOTE - CONSTITUTIONAL, MLM
normal... Well appearing, well nourished, awake, alert, oriented to person, demented  and in no apparent distress.

## 2018-06-19 NOTE — ED ADULT TRIAGE NOTE - CHIEF COMPLAINT QUOTE
83 yr. old female brought to ED for decrease in appetite, abdominal distension and nausea x 3 days.  History of dementia.  History provided by caregiver.

## 2018-06-19 NOTE — ED ADULT NURSE NOTE - OBJECTIVE STATEMENT
Amb to ED with her NA from home. According to NA pt has no appetite since last night & c/o nausea. No vomiting or diarrhea. NA was off last few days so not certain when last BM was. O/E pt is alert with mild confusion. States minimal discomfort from nausea but denies abd pain. Lungs clear. Abd - soft mild distention

## 2018-06-19 NOTE — ED PROVIDER NOTE - PROGRESS NOTE DETAILS
labs reviewed. ct pending. pt appears comfortable pt improved. pt asking for food at this time. labs and imaging reviewed. results reviewed with pt and aide, consulted pt son with results. pt advised to take miralax daily. All questions answered and concerns addressed. pt verbalized understanding and agreement with plan and dx. pt advised to follow up with PMD. pt advised to return to ed for worsenng symptoms including fever, cp, sob. will dc.

## 2018-06-19 NOTE — ED PROVIDER NOTE - OBJECTIVE STATEMENT
pt is a 82yo female with pmhx of dementia, HTN, hypothyroid, LBBB BIB aide c/o abd distention x 2 days. aide reports pt has had decreased appeitite since yesterday with nausea without vomiting. reports pt has not had a BM in days, was not with her over the weekend but no known bm over the weekend. pt son reports pt was acting herself sunday, eat pizza and ice cream without issue.     PMD: linsey

## 2018-06-19 NOTE — ED PROVIDER NOTE - ATTENDING CONTRIBUTION TO CARE
Pt with abdominal distension and mild td lower abdomen, claims to have normal BMs, no vomiting. Exam: distended abdomen, tympanic, minimally tender lower abdomen.

## 2018-07-16 PROBLEM — C50.919 MALIGNANT NEOPLASM OF UNSPECIFIED SITE OF UNSPECIFIED FEMALE BREAST: Chronic | Status: ACTIVE | Noted: 2017-02-23

## 2018-08-20 ENCOUNTER — EMERGENCY (EMERGENCY)
Facility: HOSPITAL | Age: 83
LOS: 1 days | Discharge: HOME | End: 2018-08-20
Attending: EMERGENCY MEDICINE | Admitting: EMERGENCY MEDICINE
Payer: MEDICARE

## 2018-08-20 VITALS
HEIGHT: 64 IN | TEMPERATURE: 97 F | WEIGHT: 139.99 LBS | RESPIRATION RATE: 15 BRPM | HEART RATE: 72 BPM | OXYGEN SATURATION: 97 % | DIASTOLIC BLOOD PRESSURE: 74 MMHG | SYSTOLIC BLOOD PRESSURE: 159 MMHG

## 2018-08-20 DIAGNOSIS — G56.00 CARPAL TUNNEL SYNDROME, UNSPECIFIED UPPER LIMB: Chronic | ICD-10-CM

## 2018-08-20 DIAGNOSIS — Z90.11 ACQUIRED ABSENCE OF RIGHT BREAST AND NIPPLE: Chronic | ICD-10-CM

## 2018-08-20 DIAGNOSIS — H26.9 UNSPECIFIED CATARACT: Chronic | ICD-10-CM

## 2018-08-20 PROCEDURE — 99283 EMERGENCY DEPT VISIT LOW MDM: CPT

## 2018-08-20 RX ADMIN — Medication 60 MILLIGRAM(S): at 20:21

## 2018-08-20 NOTE — ED ADULT NURSE NOTE - NSIMPLEMENTINTERV_GEN_ALL_ED
Implemented All Universal Safety Interventions:  [place] to call system. Call bell, personal items and telephone within reach. Instruct patient to call for assistance. Room bathroom lighting operational. Non-slip footwear when patient is off stretcher. Physically safe environment: no spills, clutter or unnecessary equipment. Stretcher in lowest position, wheels locked, appropriate side rails in place.

## 2018-08-20 NOTE — ED ADULT NURSE NOTE - CHPI ED NUR SYMPTOMS NEG
no purulent drainage/no redness/no rectal pain/no bleeding at site/no fever/no pain/no vomiting/no blood in mucus/no drainage/no chills

## 2018-08-20 NOTE — ED PROVIDER NOTE - SKIN, MLM
Skin is mildly erythematous papular rash on the neck anterior aspect, covered in calamine lotion. Erythematous rash on b/l feet . No discharge noted. No hives noted. Skin is mildly erythematous over neck and feet, with papular rash on the neck anterior aspect, covered in calamine lotion. No discharge noted. No hives noted.

## 2018-08-20 NOTE — ED PROVIDER NOTE - MEDICAL DECISION MAKING DETAILS
83 y.o. F with rash on neck and LEs - unclear etiology, very itchy, benedryl did not relieve - discussed differed OTC products (creams) for itch relief, will add steroids, d/w PCP reaction to current meds

## 2018-08-20 NOTE — ED PROVIDER NOTE - OBJECTIVE STATEMENT
84 y/o F pt with hx of depression, dementia, HTN, hypothyroidism, hyperparathyroidism, and LBBB presents to the ED c/o rash more on the neck and spots on the rest of the body starting yesterday. associated with pruritis, constantly present since yesterday. Pt states that she has been on antibiotics for the past 1 week and a cough medicine. Pt took Benedryl 100mg today in the afternoon, without relief of sx. Rash is described as erythematous. Pt has not consulted her doctor regarding these sx. Denies difficulty swallowing, dyspnea, fever, chills, use of new products, or SOB. No other complaints at this time.

## 2018-08-21 PROBLEM — I44.7 LEFT BUNDLE-BRANCH BLOCK, UNSPECIFIED: Chronic | Status: ACTIVE | Noted: 2017-02-23

## 2018-08-21 PROBLEM — F32.9 MAJOR DEPRESSIVE DISORDER, SINGLE EPISODE, UNSPECIFIED: Chronic | Status: ACTIVE | Noted: 2017-02-23

## 2018-08-21 PROBLEM — E21.3 HYPERPARATHYROIDISM, UNSPECIFIED: Chronic | Status: ACTIVE | Noted: 2017-02-23

## 2018-09-10 ENCOUNTER — OUTPATIENT (OUTPATIENT)
Dept: OUTPATIENT SERVICES | Facility: HOSPITAL | Age: 83
LOS: 1 days | End: 2018-09-10
Payer: MEDICARE

## 2018-09-10 DIAGNOSIS — M54.16 RADICULOPATHY, LUMBAR REGION: ICD-10-CM

## 2018-09-10 DIAGNOSIS — H26.9 UNSPECIFIED CATARACT: Chronic | ICD-10-CM

## 2018-09-10 DIAGNOSIS — Z90.11 ACQUIRED ABSENCE OF RIGHT BREAST AND NIPPLE: Chronic | ICD-10-CM

## 2018-09-10 DIAGNOSIS — G56.00 CARPAL TUNNEL SYNDROME, UNSPECIFIED UPPER LIMB: Chronic | ICD-10-CM

## 2018-09-10 PROCEDURE — 77003 FLUOROGUIDE FOR SPINE INJECT: CPT

## 2018-09-10 PROCEDURE — 62323 NJX INTERLAMINAR LMBR/SAC: CPT

## 2018-09-25 ENCOUNTER — INPATIENT (INPATIENT)
Facility: HOSPITAL | Age: 83
LOS: 2 days | Discharge: HOME HEALTH | DRG: 312 | End: 2018-09-28
Attending: INTERNAL MEDICINE | Admitting: INTERNAL MEDICINE
Payer: MEDICARE

## 2018-09-25 DIAGNOSIS — H26.9 UNSPECIFIED CATARACT: Chronic | ICD-10-CM

## 2018-09-25 DIAGNOSIS — Z90.11 ACQUIRED ABSENCE OF RIGHT BREAST AND NIPPLE: Chronic | ICD-10-CM

## 2018-09-25 DIAGNOSIS — G56.00 CARPAL TUNNEL SYNDROME, UNSPECIFIED UPPER LIMB: Chronic | ICD-10-CM

## 2018-09-25 PROCEDURE — 93010 ELECTROCARDIOGRAM REPORT: CPT

## 2018-09-25 NOTE — ED PROVIDER NOTE - OBJECTIVE STATEMENT
Patient was at home. States "I must have passed out" . Aide called EMS who found patient awake, diaphoretic and bradycardic. Gave her atropine, and she became more responsive. Presently has no c/o.  Denies chest pain, sob, h/a. No palpitations. Denies passing out before

## 2018-09-26 VITALS
DIASTOLIC BLOOD PRESSURE: 67 MMHG | HEIGHT: 64 IN | WEIGHT: 139.99 LBS | OXYGEN SATURATION: 98 % | SYSTOLIC BLOOD PRESSURE: 148 MMHG | RESPIRATION RATE: 17 BRPM | HEART RATE: 73 BPM | TEMPERATURE: 98 F

## 2018-09-26 DIAGNOSIS — F32.9 MAJOR DEPRESSIVE DISORDER, SINGLE EPISODE, UNSPECIFIED: ICD-10-CM

## 2018-09-26 DIAGNOSIS — R55 SYNCOPE AND COLLAPSE: ICD-10-CM

## 2018-09-26 DIAGNOSIS — E86.0 DEHYDRATION: ICD-10-CM

## 2018-09-26 DIAGNOSIS — R00.1 BRADYCARDIA, UNSPECIFIED: ICD-10-CM

## 2018-09-26 DIAGNOSIS — I44.7 LEFT BUNDLE-BRANCH BLOCK, UNSPECIFIED: ICD-10-CM

## 2018-09-26 DIAGNOSIS — Z29.9 ENCOUNTER FOR PROPHYLACTIC MEASURES, UNSPECIFIED: ICD-10-CM

## 2018-09-26 DIAGNOSIS — E03.9 HYPOTHYROIDISM, UNSPECIFIED: ICD-10-CM

## 2018-09-26 DIAGNOSIS — G30.9 ALZHEIMER'S DISEASE, UNSPECIFIED: ICD-10-CM

## 2018-09-26 DIAGNOSIS — I10 ESSENTIAL (PRIMARY) HYPERTENSION: ICD-10-CM

## 2018-09-26 LAB
ALBUMIN SERPL ELPH-MCNC: 3.3 G/DL — SIGNIFICANT CHANGE UP (ref 3.3–5)
ALP SERPL-CCNC: 52 U/L — SIGNIFICANT CHANGE UP (ref 30–120)
ALT FLD-CCNC: 15 U/L DA — SIGNIFICANT CHANGE UP (ref 10–60)
ANION GAP SERPL CALC-SCNC: 7 MMOL/L — SIGNIFICANT CHANGE UP (ref 5–17)
APPEARANCE UR: CLEAR — SIGNIFICANT CHANGE UP
APTT BLD: 24.5 SEC — LOW (ref 27.5–37.4)
AST SERPL-CCNC: 12 U/L — SIGNIFICANT CHANGE UP (ref 10–40)
BACTERIA # UR AUTO: ABNORMAL
BASOPHILS # BLD AUTO: 0.06 K/UL — SIGNIFICANT CHANGE UP (ref 0–0.2)
BASOPHILS NFR BLD AUTO: 0.7 % — SIGNIFICANT CHANGE UP (ref 0–2)
BILIRUB SERPL-MCNC: 0.4 MG/DL — SIGNIFICANT CHANGE UP (ref 0.2–1.2)
BILIRUB UR-MCNC: NEGATIVE — SIGNIFICANT CHANGE UP
BUN SERPL-MCNC: 31 MG/DL — HIGH (ref 7–23)
CALCIUM SERPL-MCNC: 9.1 MG/DL — SIGNIFICANT CHANGE UP (ref 8.4–10.5)
CHLORIDE SERPL-SCNC: 105 MMOL/L — SIGNIFICANT CHANGE UP (ref 96–108)
CK MB BLD-MCNC: 2.9 % — SIGNIFICANT CHANGE UP (ref 0–3.5)
CK MB CFR SERPL CALC: 0.7 NG/ML — SIGNIFICANT CHANGE UP (ref 0–3.6)
CK SERPL-CCNC: 24 U/L — LOW (ref 26–192)
CO2 SERPL-SCNC: 28 MMOL/L — SIGNIFICANT CHANGE UP (ref 22–31)
COLOR SPEC: YELLOW — SIGNIFICANT CHANGE UP
CREAT SERPL-MCNC: 1.18 MG/DL — SIGNIFICANT CHANGE UP (ref 0.5–1.3)
DIFF PNL FLD: NEGATIVE — SIGNIFICANT CHANGE UP
EOSINOPHIL # BLD AUTO: 0.32 K/UL — SIGNIFICANT CHANGE UP (ref 0–0.5)
EOSINOPHIL NFR BLD AUTO: 3.8 % — SIGNIFICANT CHANGE UP (ref 0–6)
EPI CELLS # UR: SIGNIFICANT CHANGE UP
GLUCOSE SERPL-MCNC: 150 MG/DL — HIGH (ref 70–99)
GLUCOSE UR QL: NEGATIVE MG/DL — SIGNIFICANT CHANGE UP
HCT VFR BLD CALC: 36.1 % — SIGNIFICANT CHANGE UP (ref 34.5–45)
HGB BLD-MCNC: 11.7 G/DL — SIGNIFICANT CHANGE UP (ref 11.5–15.5)
IMM GRANULOCYTES NFR BLD AUTO: 0.2 % — SIGNIFICANT CHANGE UP (ref 0–1.5)
INR BLD: 1.08 RATIO — SIGNIFICANT CHANGE UP (ref 0.88–1.16)
KETONES UR-MCNC: NEGATIVE — SIGNIFICANT CHANGE UP
LEUKOCYTE ESTERASE UR-ACNC: ABNORMAL
LYMPHOCYTES # BLD AUTO: 1.44 K/UL — SIGNIFICANT CHANGE UP (ref 1–3.3)
LYMPHOCYTES # BLD AUTO: 17.2 % — SIGNIFICANT CHANGE UP (ref 13–44)
MAGNESIUM SERPL-MCNC: 1.9 MG/DL — SIGNIFICANT CHANGE UP (ref 1.6–2.6)
MCHC RBC-ENTMCNC: 32 PG — SIGNIFICANT CHANGE UP (ref 27–34)
MCHC RBC-ENTMCNC: 32.4 GM/DL — SIGNIFICANT CHANGE UP (ref 32–36)
MCV RBC AUTO: 98.6 FL — SIGNIFICANT CHANGE UP (ref 80–100)
MONOCYTES # BLD AUTO: 0.75 K/UL — SIGNIFICANT CHANGE UP (ref 0–0.9)
MONOCYTES NFR BLD AUTO: 9 % — SIGNIFICANT CHANGE UP (ref 2–14)
NEUTROPHILS # BLD AUTO: 5.77 K/UL — SIGNIFICANT CHANGE UP (ref 1.8–7.4)
NEUTROPHILS NFR BLD AUTO: 69.1 % — SIGNIFICANT CHANGE UP (ref 43–77)
NITRITE UR-MCNC: NEGATIVE — SIGNIFICANT CHANGE UP
NRBC # BLD: 0 /100 WBCS — SIGNIFICANT CHANGE UP (ref 0–0)
PH UR: 5 — SIGNIFICANT CHANGE UP (ref 5–8)
PLATELET # BLD AUTO: 379 K/UL — SIGNIFICANT CHANGE UP (ref 150–400)
POTASSIUM SERPL-MCNC: 3.8 MMOL/L — SIGNIFICANT CHANGE UP (ref 3.5–5.3)
POTASSIUM SERPL-SCNC: 3.8 MMOL/L — SIGNIFICANT CHANGE UP (ref 3.5–5.3)
PROT SERPL-MCNC: 5.9 G/DL — LOW (ref 6–8.3)
PROT UR-MCNC: 30 MG/DL
PROTHROM AB SERPL-ACNC: 11.8 SEC — SIGNIFICANT CHANGE UP (ref 9.8–12.7)
RBC # BLD: 3.66 M/UL — LOW (ref 3.8–5.2)
RBC # FLD: 12.7 % — SIGNIFICANT CHANGE UP (ref 10.3–14.5)
RBC CASTS # UR COMP ASSIST: SIGNIFICANT CHANGE UP /HPF (ref 0–4)
SODIUM SERPL-SCNC: 140 MMOL/L — SIGNIFICANT CHANGE UP (ref 135–145)
SP GR SPEC: 1.02 — SIGNIFICANT CHANGE UP (ref 1.01–1.02)
TROPONIN I SERPL-MCNC: 0 NG/ML — LOW (ref 0.02–0.06)
TSH SERPL-MCNC: 1.44 UIU/ML — SIGNIFICANT CHANGE UP (ref 0.27–4.2)
UROBILINOGEN FLD QL: NEGATIVE MG/DL — SIGNIFICANT CHANGE UP
WBC # BLD: 8.36 K/UL — SIGNIFICANT CHANGE UP (ref 3.8–10.5)
WBC # FLD AUTO: 8.36 K/UL — SIGNIFICANT CHANGE UP (ref 3.8–10.5)
WBC UR QL: ABNORMAL

## 2018-09-26 PROCEDURE — 99285 EMERGENCY DEPT VISIT HI MDM: CPT

## 2018-09-26 PROCEDURE — 70450 CT HEAD/BRAIN W/O DYE: CPT | Mod: 26

## 2018-09-26 PROCEDURE — 71045 X-RAY EXAM CHEST 1 VIEW: CPT | Mod: 26

## 2018-09-26 PROCEDURE — 93306 TTE W/DOPPLER COMPLETE: CPT | Mod: 26

## 2018-09-26 RX ORDER — INFLUENZA VIRUS VACCINE 15; 15; 15; 15 UG/.5ML; UG/.5ML; UG/.5ML; UG/.5ML
0.5 SUSPENSION INTRAMUSCULAR ONCE
Qty: 0 | Refills: 0 | Status: COMPLETED | OUTPATIENT
Start: 2018-09-26 | End: 2018-09-28

## 2018-09-26 RX ORDER — LANOLIN ALCOHOL/MO/W.PET/CERES
3 CREAM (GRAM) TOPICAL ONCE
Qty: 0 | Refills: 0 | Status: COMPLETED | OUTPATIENT
Start: 2018-09-26 | End: 2018-09-26

## 2018-09-26 RX ORDER — LEVOTHYROXINE SODIUM 125 MCG
125 TABLET ORAL ONCE
Qty: 0 | Refills: 0 | Status: COMPLETED | OUTPATIENT
Start: 2018-09-26 | End: 2018-09-26

## 2018-09-26 RX ORDER — LETROZOLE 2.5 MG/1
2.5 TABLET, FILM COATED ORAL DAILY
Qty: 0 | Refills: 0 | Status: DISCONTINUED | OUTPATIENT
Start: 2018-09-26 | End: 2018-09-28

## 2018-09-26 RX ORDER — LOSARTAN POTASSIUM 100 MG/1
50 TABLET, FILM COATED ORAL DAILY
Qty: 0 | Refills: 0 | Status: DISCONTINUED | OUTPATIENT
Start: 2018-09-26 | End: 2018-09-28

## 2018-09-26 RX ORDER — FAMOTIDINE 10 MG/ML
20 INJECTION INTRAVENOUS
Qty: 0 | Refills: 0 | Status: DISCONTINUED | OUTPATIENT
Start: 2018-09-26 | End: 2018-09-26

## 2018-09-26 RX ORDER — LANOLIN ALCOHOL/MO/W.PET/CERES
3 CREAM (GRAM) TOPICAL AT BEDTIME
Qty: 0 | Refills: 0 | Status: DISCONTINUED | OUTPATIENT
Start: 2018-09-26 | End: 2018-09-28

## 2018-09-26 RX ORDER — SODIUM CHLORIDE 9 MG/ML
1000 INJECTION, SOLUTION INTRAVENOUS
Qty: 0 | Refills: 0 | Status: DISCONTINUED | OUTPATIENT
Start: 2018-09-26 | End: 2018-09-28

## 2018-09-26 RX ORDER — BUPROPION HYDROCHLORIDE 150 MG/1
300 TABLET, EXTENDED RELEASE ORAL DAILY
Qty: 0 | Refills: 0 | Status: DISCONTINUED | OUTPATIENT
Start: 2018-09-26 | End: 2018-09-28

## 2018-09-26 RX ORDER — MIRTAZAPINE 45 MG/1
7.5 TABLET, ORALLY DISINTEGRATING ORAL
Qty: 0 | Refills: 0 | COMMUNITY

## 2018-09-26 RX ORDER — BUPROPION HYDROCHLORIDE 150 MG/1
150 TABLET, EXTENDED RELEASE ORAL
Qty: 0 | Refills: 0 | COMMUNITY

## 2018-09-26 RX ORDER — HEPARIN SODIUM 5000 [USP'U]/ML
5000 INJECTION INTRAVENOUS; SUBCUTANEOUS EVERY 12 HOURS
Qty: 0 | Refills: 0 | Status: DISCONTINUED | OUTPATIENT
Start: 2018-09-26 | End: 2018-09-28

## 2018-09-26 RX ORDER — CHOLECALCIFEROL (VITAMIN D3) 125 MCG
2000 CAPSULE ORAL DAILY
Qty: 0 | Refills: 0 | Status: DISCONTINUED | OUTPATIENT
Start: 2018-09-26 | End: 2018-09-28

## 2018-09-26 RX ORDER — QUETIAPINE FUMARATE 200 MG/1
25 TABLET, FILM COATED ORAL
Qty: 0 | Refills: 0 | Status: DISCONTINUED | OUTPATIENT
Start: 2018-09-26 | End: 2018-09-28

## 2018-09-26 RX ORDER — BUDESONIDE AND FORMOTEROL FUMARATE DIHYDRATE 160; 4.5 UG/1; UG/1
2 AEROSOL RESPIRATORY (INHALATION)
Qty: 0 | Refills: 0 | Status: DISCONTINUED | OUTPATIENT
Start: 2018-09-26 | End: 2018-09-28

## 2018-09-26 RX ORDER — MEMANTINE HYDROCHLORIDE 10 MG/1
10 TABLET ORAL
Qty: 0 | Refills: 0 | Status: DISCONTINUED | OUTPATIENT
Start: 2018-09-26 | End: 2018-09-28

## 2018-09-26 RX ORDER — MIRTAZAPINE 45 MG/1
15 TABLET, ORALLY DISINTEGRATING ORAL AT BEDTIME
Qty: 0 | Refills: 0 | Status: DISCONTINUED | OUTPATIENT
Start: 2018-09-26 | End: 2018-09-28

## 2018-09-26 RX ORDER — ASPIRIN/CALCIUM CARB/MAGNESIUM 324 MG
81 TABLET ORAL DAILY
Qty: 0 | Refills: 0 | Status: DISCONTINUED | OUTPATIENT
Start: 2018-09-26 | End: 2018-09-28

## 2018-09-26 RX ORDER — LEVOTHYROXINE SODIUM 125 MCG
1 TABLET ORAL
Qty: 0 | Refills: 0 | COMMUNITY

## 2018-09-26 RX ORDER — SODIUM CHLORIDE 0.65 %
1 AEROSOL, SPRAY (ML) NASAL
Qty: 0 | Refills: 0 | Status: DISCONTINUED | OUTPATIENT
Start: 2018-09-26 | End: 2018-09-28

## 2018-09-26 RX ORDER — LEVOTHYROXINE SODIUM 125 MCG
125 TABLET ORAL DAILY
Qty: 0 | Refills: 0 | Status: DISCONTINUED | OUTPATIENT
Start: 2018-09-27 | End: 2018-09-28

## 2018-09-26 RX ORDER — FAMOTIDINE 10 MG/ML
20 INJECTION INTRAVENOUS DAILY
Qty: 0 | Refills: 0 | Status: DISCONTINUED | OUTPATIENT
Start: 2018-09-26 | End: 2018-09-28

## 2018-09-26 RX ORDER — ACETAMINOPHEN 500 MG
650 TABLET ORAL EVERY 6 HOURS
Qty: 0 | Refills: 0 | Status: DISCONTINUED | OUTPATIENT
Start: 2018-09-26 | End: 2018-09-28

## 2018-09-26 RX ADMIN — Medication 2000 UNIT(S): at 11:10

## 2018-09-26 RX ADMIN — Medication 81 MILLIGRAM(S): at 11:10

## 2018-09-26 RX ADMIN — HEPARIN SODIUM 5000 UNIT(S): 5000 INJECTION INTRAVENOUS; SUBCUTANEOUS at 17:27

## 2018-09-26 RX ADMIN — QUETIAPINE FUMARATE 25 MILLIGRAM(S): 200 TABLET, FILM COATED ORAL at 17:27

## 2018-09-26 RX ADMIN — BUPROPION HYDROCHLORIDE 300 MILLIGRAM(S): 150 TABLET, EXTENDED RELEASE ORAL at 11:17

## 2018-09-26 RX ADMIN — BUDESONIDE AND FORMOTEROL FUMARATE DIHYDRATE 2 PUFF(S): 160; 4.5 AEROSOL RESPIRATORY (INHALATION) at 18:30

## 2018-09-26 RX ADMIN — MEMANTINE HYDROCHLORIDE 10 MILLIGRAM(S): 10 TABLET ORAL at 17:27

## 2018-09-26 RX ADMIN — FAMOTIDINE 20 MILLIGRAM(S): 10 INJECTION INTRAVENOUS at 11:10

## 2018-09-26 RX ADMIN — MIRTAZAPINE 15 MILLIGRAM(S): 45 TABLET, ORALLY DISINTEGRATING ORAL at 21:10

## 2018-09-26 RX ADMIN — Medication 500 MILLIGRAM(S): at 00:00

## 2018-09-26 RX ADMIN — HEPARIN SODIUM 5000 UNIT(S): 5000 INJECTION INTRAVENOUS; SUBCUTANEOUS at 05:56

## 2018-09-26 RX ADMIN — Medication 125 MICROGRAM(S): at 16:39

## 2018-09-26 RX ADMIN — Medication 500 MILLIGRAM(S): at 17:27

## 2018-09-26 RX ADMIN — Medication 3 MILLIGRAM(S): at 21:10

## 2018-09-26 RX ADMIN — Medication 500 MILLIGRAM(S): at 05:56

## 2018-09-26 RX ADMIN — LETROZOLE 2.5 MILLIGRAM(S): 2.5 TABLET, FILM COATED ORAL at 11:10

## 2018-09-26 RX ADMIN — FAMOTIDINE 20 MILLIGRAM(S): 10 INJECTION INTRAVENOUS at 05:56

## 2018-09-26 RX ADMIN — SODIUM CHLORIDE 50 MILLILITER(S): 9 INJECTION, SOLUTION INTRAVENOUS at 05:56

## 2018-09-26 RX ADMIN — BUDESONIDE AND FORMOTEROL FUMARATE DIHYDRATE 2 PUFF(S): 160; 4.5 AEROSOL RESPIRATORY (INHALATION) at 05:56

## 2018-09-26 RX ADMIN — LOSARTAN POTASSIUM 50 MILLIGRAM(S): 100 TABLET, FILM COATED ORAL at 05:56

## 2018-09-26 NOTE — CONSULT NOTE ADULT - ASSESSMENT
For episode of loss of consciousness, suspect most likely this was syncopal event secondary to cerebral hypoperfusion possible bradycardia.    tele eval   monitor sbp   ok to hold aricept   spoke to family

## 2018-09-26 NOTE — ED ADULT NURSE NOTE - OBJECTIVE STATEMENT
83 yr old female BIB EMS s/p syncopal episode witnessed by aide tonight; pt found diaphoretic and bradycardic by EMS; atropine given in field by EMS and patient became more responsive and alert. Pt states she was feeling fine throughout the day yesterday; denies any complaints.

## 2018-09-26 NOTE — H&P ADULT - NEGATIVE GENERAL GENITOURINARY SYMPTOMS
no bladder infections/no dysuria/no flank pain L/no hematuria/no renal colic/no flank pain R/normal urinary frequency

## 2018-09-26 NOTE — ED ADULT TRIAGE NOTE - CHIEF COMPLAINT QUOTE
pt passed out at home witnessed by aide; EMS found patient bradycardic and diaphoretic; atropine given by EMS

## 2018-09-26 NOTE — H&P ADULT - PROBLEM SELECTOR PLAN 1
CONTINUE IV HYDRATION ,NEUROLOGY AND CARDIOLOGY CONSULTS CALLED ,MONITORING IN TELE ,ORTHOSTATIC ASSESEMENT ,PT EVALUATION ,OOB TC Check electrolytes

## 2018-09-26 NOTE — CONSULT NOTE ADULT - ASSESSMENT
The patient is an 83 year old female with a history of HTN, hypothyroidism, LBBB, dementia who presents with syncope.    Plan:  - Etiology of syncope likely vasovagal in origin given both BP and HR were low. Possibly orthostatic hypotension.  - ECG with underlying LBBB (old). Otherwise no predisposing features for syncope.  - Monitor on telemetry. No events thus far.  - Two sets of cardiac enzymes negative  - Check echocardiogram  - Check orthostatics  - Continue losartan for HTN

## 2018-09-26 NOTE — H&P ADULT - FAMILY HISTORY
Family history of heart disease     Family history of breast cancer     Child  Still living? No  Family history of lymphoma, Age at diagnosis: 31-40

## 2018-09-26 NOTE — PHYSICAL THERAPY INITIAL EVALUATION ADULT - ADDITIONAL COMMENTS
Pt lives in a house with 3 BILLY, +HR. Inside house pt has 3 steps, +HR. Pt reports using a single axis cane for the past several months due to slight decrease in balance. Pt has multiple aides that are with her 24/7 as per RN.

## 2018-09-26 NOTE — H&P ADULT - NEGATIVE MUSCULOSKELETAL SYMPTOMS
no arm pain L/no joint swelling/no myalgia/no arm pain R/no arthralgia/no muscle cramps/no muscle weakness/no neck pain/no back pain

## 2018-09-26 NOTE — H&P ADULT - NSHPSOCIALHISTORY_GEN_ALL_CORE
RESIDES WITH / AID ,HAS 2 SONS ,WHO LIVE IN A CITY , AND ONE OF THREE CHILDREN  .DENIES ETOH AND TOBACCO .USES CANE AT HOME FOR AMBULATION AS NEEDED

## 2018-09-26 NOTE — H&P ADULT - NEGATIVE PSYCHIATRIC SYMPTOMS
no auditory hallucinations/no visual hallucinations/no paranoia/no agitation/no hyperactivity/no suicidal ideation

## 2018-09-26 NOTE — CONSULT NOTE ADULT - SUBJECTIVE AND OBJECTIVE BOX
History of Present Illness: The patient is an 83 year old female with a history of HTN, hypothyroidism, LBBB, dementia who presents with syncope. She was standing yesterday, about to go upstairs when she had sudden loss of consciousness. There was no dizziness, palpitations, chest pain, shortness of breath, nausea. Episode lasted for a couple of minutes. When EMS came, they noted a low BP and a HR around 50 bpm. She was given atropine and IV fluids. She currently feels well and has no complaints. There was no prior syncope.    Past Medical/Surgical History:  HTN, hypothyroidism, LBBB, dementia    Medications:  MEDICATIONS  (STANDING):  aspirin  chewable 81 milliGRAM(s) Oral daily  buDESOnide  80 MICROgram(s)/formoterol 4.5 MICROgram(s) Inhaler 2 Puff(s) Inhalation two times a day  buPROPion XL . 300 milliGRAM(s) Oral daily  cholecalciferol 2000 Unit(s) Oral daily  famotidine    Tablet 20 milliGRAM(s) Oral daily  heparin  Injectable 5000 Unit(s) SubCutaneous every 12 hours  influenza   Vaccine 0.5 milliLiter(s) IntraMuscular once  letrozole 2.5 milliGRAM(s) Oral daily  losartan 50 milliGRAM(s) Oral daily  memantine 10 milliGRAM(s) Oral two times a day  mirtazapine 15 milliGRAM(s) Oral at bedtime  naproxen 500 milliGRAM(s) Oral two times a day  QUEtiapine 25 milliGRAM(s) Oral two times a day  sodium chloride 0.45%. 1000 milliLiter(s) (50 mL/Hr) IV Continuous <Continuous>    MEDICATIONS  (PRN):  acetaminophen   Tablet .. 650 milliGRAM(s) Oral every 6 hours PRN Temp greater or equal to 38C (100.4F), Mild Pain (1 - 3)  sodium chloride 0.65% Nasal 1 Spray(s) Both Nostrils two times a day PRN Nasal Congestion      Family History: Non-contributory family history of premature cardiovascular atherosclerotic disease    Social History: No tobacco, alcohol or drug use    Review of Systems:  General: No fevers, chills, weight loss or gain  Skin: No rashes, color changes  Cardiovascular: No chest pain, orthopnea  Respiratory: No shortness of breath, cough  Gastrointestinal: No nausea, abdominal pain  Genitourinary: No incontinence, pain with urination  Musculoskeletal: No pain, swelling, decreased range of motion  Neurological: No headache, weakness  Psychiatric: No depression, anxiety  Endocrine: No weight loss or gain, increased thirst  All other systems are comprehensively negative.    Physical Exam:  Vitals:        Vital Signs Last 24 Hrs  T(C): 36.6 (26 Sep 2018 04:32), Max: 37 (26 Sep 2018 04:30)  T(F): 97.9 (26 Sep 2018 04:32), Max: 98.6 (26 Sep 2018 04:30)  HR: 64 (26 Sep 2018 04:32) (56 - 73)  BP: 131/61 (26 Sep 2018 04:32) (110/59 - 148/88)  BP(mean): --  RR: 19 (26 Sep 2018 04:32) (12 - 19)  SpO2: 97% (26 Sep 2018 04:32) (97% - 100%)  General: NAD  HEENT: MMM  Neck: No JVD, no carotid bruit  Lungs: CTAB  CV: RRR, nl S1/S2, no M/R/G  Abdomen: S/NT/ND, +BS  Extremities: No LE edema, no cyanosis  Neuro: AAOx3, non-focal  Skin: No rash    Labs:                        11.6   7.39  )-----------( 384      ( 26 Sep 2018 07:50 )             36.0     09-26    137  |  105  |  28<H>  ----------------------------<  97  3.9   |  28  |  1.06    Ca    8.9      26 Sep 2018 07:50  Phos  3.1     09-26  Mg     1.9     09-26    TPro  5.9<L>  /  Alb  3.3  /  TBili  0.4  /  DBili  x   /  AST  12  /  ALT  15  /  AlkPhos  52  09-25    CARDIAC MARKERS ( 26 Sep 2018 07:50 )  .001 ng/mL / x     / x     / x     / x      CARDIAC MARKERS ( 25 Sep 2018 23:59 )  .000 ng/mL / x     / 24 U/L / x     / 0.7 ng/mL      PT/INR - ( 25 Sep 2018 23:59 )   PT: 11.8 sec;   INR: 1.08 ratio         PTT - ( 25 Sep 2018 23:59 )  PTT:24.5 sec    ECG: NSR, LBBB

## 2018-09-26 NOTE — ED ADULT NURSE NOTE - CHPI ED NUR SYMPTOMS NEG
no chest pain/no diaphoresis/no congestion/no dizziness/no shortness of breath/no vomiting/no fever/no nausea/no back pain/no chills

## 2018-09-26 NOTE — H&P ADULT - ASSESSMENT
82 YO WF with hx of BREAST CA, S/P LUMPECTOMY IN 2016 ,HTN ,HYPOTHYROIDISM ,HYPERPARATHYROIDISM ,DEPRESSION ,DEMENTIA ,CAD/LBBB,S/P CTS SURGERY, CATARACT SX  was brought to ER for evaluation of nearsyncopal  episode ,witnessed by healthcare aid ,who called 911 EMS found the patient diaphoretic and bradycardic ,atropine was administrated with improvement of HR ,patient admitted to telemetry unit ,sinus bradycardia 56-64 is currently on monitor . Psychiatry evaluation is requested to review home medications as potential cause of bradycardia ,donepezil was held . Cardiology and neurology evaluations are requested .Admit to telemetry unit for monitoring ,send 3 sets of cardiac enzymes to rule out coronary event ,obtain ECHO to evaluate LVEF, cardiology consult, continue current managmnet,O2 supply ,anticoagulation plan as per cardiology consult Patient denies hx of similar episodes in a past .No signs of infection or sepsis found ,CT brain is negative for acute events .Found to have BUN elevation and iv fluids administrated ,po intake of fluids encouraged .

## 2018-09-26 NOTE — GOALS OF CARE CONVERSATION - PERSONAL ADVANCE DIRECTIVE - CONVERSATION DETAILS
Spoke to pts son elif who states that pt never completed HCP and does not want to initiate any advance directives at this time

## 2018-09-26 NOTE — H&P ADULT - NSHPLABSRESULTS_GEN_ALL_CORE
< from: CT Head No Cont (09.26.18 @ 00:08) >  EXAM:  CT BRAIN                        PROCEDURE DATE:  09/26/2018    INTERPRETATION:  CLINICAL INFORMATION: Syncope and unresponsiveness.  TECHNIQUE: Multiple axial sections were acquired from the base of the   skull to the vertex without contrast enhancement. Coronal and sagittal   reformats were additionally performed. Beam hardening artifact slightly   obscures evaluation of the posterior fossa and brainstem.  COMPARISON: No similar prior studies are available for comparison.  FINDINGS:  Parenchymal volume loss is noted with prominent ventricles and sulci. No   acute territorial infarct is demonstrated.  There is no evidence of an acute hemorrhage or mass-effect in the   posterior fossa or in the supratentorial region.   Evaluation of the osseous structures with the appropriate window appears   unremarkable. Vascular calcifications are noted. The visualized paranasal   sinuses and mastoid air cells are clear.  IMPRESSION:   No acute territorial infarct, hemorrhage or mass effect.  < end of copied text >

## 2018-09-26 NOTE — H&P ADULT - RESPIRATORY
Feet Incubation Time: 2 Hours Location Override: face Face And Ears Incubation Time: 1 Hour Detail Level: Simple Pdt Type: ROJELIO-U Face And Scalp Incubation Time: 1 Hour for the face and 2 Hours for the scalp Frequency Of Pdt: Single Treatment Consent: The procedure and risks were reviewed with the patient including but not limited to: burning, pigmentary changes, pain, blistering, scabbing, redness, and the possibility of needing numerous treatments. Strict photoprotection after the procedure was also discussed. detailed exam

## 2018-09-26 NOTE — ED ADULT NURSE NOTE - NSIMPLEMENTINTERV_GEN_ALL_ED
Implemented All Fall with Harm Risk Interventions:  [place] to call system. Call bell, personal items and telephone within reach. Instruct patient to call for assistance. Room bathroom lighting operational. Non-slip footwear when patient is off stretcher. Physically safe environment: no spills, clutter or unnecessary equipment. Stretcher in lowest position, wheels locked, appropriate side rails in place. Provide visual cue, wrist band, yellow gown, etc. Monitor gait and stability. Monitor for mental status changes and reorient to person, place, and time. Review medications for side effects contributing to fall risk. Reinforce activity limits and safety measures with patient and family. Provide visual clues: red socks.

## 2018-09-26 NOTE — H&P ADULT - HISTORY OF PRESENT ILLNESS
84 YO WF with hx of BREAST CA, S/P LUMPECTOMY IN 2016 ,HTN ,HYPOTHYROIDISM ,HYPERPARATHYROIDISM ,DEPRESSION ,DEMENTIA ,CAD/LBBB,S/P CTS SURGERY, CATARACT SX  was brought to ER for evaluation of nearsyncopal  episode ,witnessed by healthcare aid ,who called 911 EMS found the patient diaphoretic and bradycardic ,atropine was administrated with improvement of HR ,patient admitted to telemetry unit ,sinus bradycardia 56-64 is currently on monitor . Psychiatry evaluation is requested to review home medications as potential cause of bradycardia ,donepezil was held . Cardiology and neurology evaluations are requested .Admit to telemetry unit for monitoring ,send 3 sets of cardiac enzymes to rule out coronary event ,obtain ECHO to evaluate LVEF, cardiology consult, continue current managmnet,O2 supply ,anticoagulation plan as per cardiology consult Patient denies hx of similar episodes in a past .No signs of infection or sepsis found ,CT brain is negative for acute events .Found to have BUN elevation and iv fluids administrated ,po intake of fluids encouraged .

## 2018-09-26 NOTE — H&P ADULT - NEGATIVE GASTROINTESTINAL SYMPTOMS
no diarrhea/no change in bowel habits/no flatulence/no hematochezia/no abdominal pain/no melena/no jaundice/no nausea/no vomiting/no constipation

## 2018-09-26 NOTE — H&P ADULT - RS GEN PE MLT RESP DETAILS PC
airway patent/good air movement/breath sounds equal/no chest wall tenderness/normal/clear to auscultation bilaterally/no intercostal retractions/respirations non-labored

## 2018-09-27 DIAGNOSIS — R91.8 OTHER NONSPECIFIC ABNORMAL FINDING OF LUNG FIELD: ICD-10-CM

## 2018-09-27 LAB
ANION GAP SERPL CALC-SCNC: 6 MMOL/L — SIGNIFICANT CHANGE UP (ref 5–17)
BUN SERPL-MCNC: 21 MG/DL — SIGNIFICANT CHANGE UP (ref 7–23)
CALCIUM SERPL-MCNC: 9.2 MG/DL — SIGNIFICANT CHANGE UP (ref 8.4–10.5)
CHLORIDE SERPL-SCNC: 108 MMOL/L — SIGNIFICANT CHANGE UP (ref 96–108)
CO2 SERPL-SCNC: 29 MMOL/L — SIGNIFICANT CHANGE UP (ref 22–31)
CREAT SERPL-MCNC: 1.09 MG/DL — SIGNIFICANT CHANGE UP (ref 0.5–1.3)
CULTURE RESULTS: NO GROWTH — SIGNIFICANT CHANGE UP
CULTURE RESULTS: SIGNIFICANT CHANGE UP
GLUCOSE SERPL-MCNC: 93 MG/DL — SIGNIFICANT CHANGE UP (ref 70–99)
HCT VFR BLD CALC: 36.8 % — SIGNIFICANT CHANGE UP (ref 34.5–45)
HGB BLD-MCNC: 11.9 G/DL — SIGNIFICANT CHANGE UP (ref 11.5–15.5)
MCHC RBC-ENTMCNC: 31.5 PG — SIGNIFICANT CHANGE UP (ref 27–34)
MCHC RBC-ENTMCNC: 32.3 GM/DL — SIGNIFICANT CHANGE UP (ref 32–36)
MCV RBC AUTO: 97.4 FL — SIGNIFICANT CHANGE UP (ref 80–100)
NRBC # BLD: 0 /100 WBCS — SIGNIFICANT CHANGE UP (ref 0–0)
PLATELET # BLD AUTO: 345 K/UL — SIGNIFICANT CHANGE UP (ref 150–400)
POTASSIUM SERPL-MCNC: 4.1 MMOL/L — SIGNIFICANT CHANGE UP (ref 3.5–5.3)
POTASSIUM SERPL-SCNC: 4.1 MMOL/L — SIGNIFICANT CHANGE UP (ref 3.5–5.3)
RBC # BLD: 3.78 M/UL — LOW (ref 3.8–5.2)
RBC # FLD: 12.7 % — SIGNIFICANT CHANGE UP (ref 10.3–14.5)
SODIUM SERPL-SCNC: 143 MMOL/L — SIGNIFICANT CHANGE UP (ref 135–145)
SPECIMEN SOURCE: SIGNIFICANT CHANGE UP
WBC # BLD: 5.41 K/UL — SIGNIFICANT CHANGE UP (ref 3.8–10.5)
WBC # FLD AUTO: 5.41 K/UL — SIGNIFICANT CHANGE UP (ref 3.8–10.5)

## 2018-09-27 PROCEDURE — 12345: CPT | Mod: NC

## 2018-09-27 PROCEDURE — 93970 EXTREMITY STUDY: CPT | Mod: 26

## 2018-09-27 RX ORDER — DONEPEZIL HYDROCHLORIDE 10 MG/1
10 TABLET, FILM COATED ORAL AT BEDTIME
Qty: 0 | Refills: 0 | Status: DISCONTINUED | OUTPATIENT
Start: 2018-09-27 | End: 2018-09-28

## 2018-09-27 RX ORDER — ZOLPIDEM TARTRATE 10 MG/1
5 TABLET ORAL ONCE
Qty: 0 | Refills: 0 | Status: DISCONTINUED | OUTPATIENT
Start: 2018-09-27 | End: 2018-09-27

## 2018-09-27 RX ADMIN — HEPARIN SODIUM 5000 UNIT(S): 5000 INJECTION INTRAVENOUS; SUBCUTANEOUS at 17:58

## 2018-09-27 RX ADMIN — LETROZOLE 2.5 MILLIGRAM(S): 2.5 TABLET, FILM COATED ORAL at 12:23

## 2018-09-27 RX ADMIN — Medication 500 MILLIGRAM(S): at 17:58

## 2018-09-27 RX ADMIN — Medication 500 MILLIGRAM(S): at 18:28

## 2018-09-27 RX ADMIN — MEMANTINE HYDROCHLORIDE 10 MILLIGRAM(S): 10 TABLET ORAL at 17:58

## 2018-09-27 RX ADMIN — BUDESONIDE AND FORMOTEROL FUMARATE DIHYDRATE 2 PUFF(S): 160; 4.5 AEROSOL RESPIRATORY (INHALATION) at 17:58

## 2018-09-27 RX ADMIN — ZOLPIDEM TARTRATE 5 MILLIGRAM(S): 10 TABLET ORAL at 22:14

## 2018-09-27 RX ADMIN — BUPROPION HYDROCHLORIDE 300 MILLIGRAM(S): 150 TABLET, EXTENDED RELEASE ORAL at 12:22

## 2018-09-27 RX ADMIN — BUDESONIDE AND FORMOTEROL FUMARATE DIHYDRATE 2 PUFF(S): 160; 4.5 AEROSOL RESPIRATORY (INHALATION) at 06:41

## 2018-09-27 RX ADMIN — MEMANTINE HYDROCHLORIDE 10 MILLIGRAM(S): 10 TABLET ORAL at 06:20

## 2018-09-27 RX ADMIN — QUETIAPINE FUMARATE 25 MILLIGRAM(S): 200 TABLET, FILM COATED ORAL at 06:20

## 2018-09-27 RX ADMIN — FAMOTIDINE 20 MILLIGRAM(S): 10 INJECTION INTRAVENOUS at 12:23

## 2018-09-27 RX ADMIN — QUETIAPINE FUMARATE 25 MILLIGRAM(S): 200 TABLET, FILM COATED ORAL at 17:58

## 2018-09-27 RX ADMIN — Medication 500 MILLIGRAM(S): at 07:27

## 2018-09-27 RX ADMIN — MIRTAZAPINE 15 MILLIGRAM(S): 45 TABLET, ORALLY DISINTEGRATING ORAL at 21:08

## 2018-09-27 RX ADMIN — Medication 125 MICROGRAM(S): at 06:20

## 2018-09-27 RX ADMIN — Medication 81 MILLIGRAM(S): at 12:23

## 2018-09-27 RX ADMIN — Medication 500 MILLIGRAM(S): at 06:20

## 2018-09-27 RX ADMIN — HEPARIN SODIUM 5000 UNIT(S): 5000 INJECTION INTRAVENOUS; SUBCUTANEOUS at 06:20

## 2018-09-27 RX ADMIN — DONEPEZIL HYDROCHLORIDE 10 MILLIGRAM(S): 10 TABLET, FILM COATED ORAL at 21:08

## 2018-09-27 RX ADMIN — Medication 2000 UNIT(S): at 12:23

## 2018-09-27 RX ADMIN — LOSARTAN POTASSIUM 50 MILLIGRAM(S): 100 TABLET, FILM COATED ORAL at 06:20

## 2018-09-27 NOTE — PROGRESS NOTE ADULT - PROBLEM SELECTOR PLAN 7
hold donepezil ,neurology cons called Son Ari refused psych cons evaluation ,because patient has outpatient neuropsychiatrist who is managing all her dementia meds and depression management He requested no changes in dosages to be done in a hospital

## 2018-09-27 NOTE — PROGRESS NOTE ADULT - PROBLEM SELECTOR PLAN 3
AS PER CARD CONS LIKELY WAS NOT CAUSING FACTOR FOR SYNCOPE ,ok to resume aricept as per Dr Oliveros . RULED OUT FOR AMI

## 2018-09-27 NOTE — PROGRESS NOTE ADULT - SUBJECTIVE AND OBJECTIVE BOX
Neurology follow up note    PONCHO SEN83yFemale      Interval History:    Patient feels ok no new complaints seen with AID    MEDICATIONS    acetaminophen   Tablet .. 650 milliGRAM(s) Oral every 6 hours PRN  aspirin  chewable 81 milliGRAM(s) Oral daily  buDESOnide  80 MICROgram(s)/formoterol 4.5 MICROgram(s) Inhaler 2 Puff(s) Inhalation two times a day  buPROPion XL . 300 milliGRAM(s) Oral daily  cholecalciferol 2000 Unit(s) Oral daily  famotidine    Tablet 20 milliGRAM(s) Oral daily  heparin  Injectable 5000 Unit(s) SubCutaneous every 12 hours  influenza   Vaccine 0.5 milliLiter(s) IntraMuscular once  letrozole 2.5 milliGRAM(s) Oral daily  levothyroxine 125 MICROGram(s) Oral daily  losartan 50 milliGRAM(s) Oral daily  melatonin 3 milliGRAM(s) Oral at bedtime PRN  memantine 10 milliGRAM(s) Oral two times a day  mirtazapine 15 milliGRAM(s) Oral at bedtime  naproxen 500 milliGRAM(s) Oral two times a day  QUEtiapine 25 milliGRAM(s) Oral two times a day  sodium chloride 0.45%. 1000 milliLiter(s) IV Continuous <Continuous>  sodium chloride 0.65% Nasal 1 Spray(s) Both Nostrils two times a day PRN      Allergies    Ceftin (Pruritus; Rash)    Intolerances            Vital Signs Last 24 Hrs  T(C): 36.4 (27 Sep 2018 05:13), Max: 37 (26 Sep 2018 14:14)  T(F): 97.5 (27 Sep 2018 05:13), Max: 98.6 (26 Sep 2018 14:14)  HR: 62 (27 Sep 2018 05:13) (61 - 76)  BP: 127/60 (27 Sep 2018 05:13) (127/60 - 167/80)  BP(mean): --  RR: 16 (27 Sep 2018 05:13) (16 - 18)  SpO2: 97% (27 Sep 2018 05:13) (96% - 98%)      REVIEW OF SYSTEMS: Limited or unable to obtain secondary to patient's poor mental status.    Constitutional: No fever, chills, fatigue, weakness  Eyes: no eye pain, visual disturbances, or discharge  ENT:  No difficulty hearing, tinnitus, vertigo; No sinus or throat pain  Neck: No pain or stiffness  Respiratory: No cough, dyspnea, wheezing   Cardiovascular: No chest pain, palpitations,   Gastrointestinal: No abdominal or epigastric pain. No nausea, vomiting  No diarrhea or constipation.   Genitourinary: No dysuria, frequency, hematuria or incontinence  Neurological: No headaches, lightheadedness, vertigo, numbness or tremors  Psychiatric: No depression, anxiety, mood swings or difficulty sleeping  Musculoskeletal: No joint pain or swelling; No muscle, back or extremity pain  Skin: No itching, burning, rashes or lesions   Lymph Nodes: No enlarged glands  Endocrine: No heat or cold intolerance; No hair loss   Allergy and Immunologic: No hives or eczema    On Neurological Examination:    The patient is awake and alert.     Able to name objects.      Extraocular movements were intact.    Pupils were equal, round, and reactive bilaterally, 3 mm to 2 mm.    Speech was fluent.  Smile was symmetric.    Motor:  Bilateral upper and lower were 4+/5, would say age appropriate.    Sensory:  Bilateral upper and lower were intact to light touch.    No drift.  Finger-to-nose within normal limits.      Follow simple commands  Follow complex commands      GENERAL Exam: Nontoxic , No Acute Distress   	  HEENT:  normocephalic, atraumatic  		  LUNGS: Clear bilaterally      HEART: Normal S1S2   No murmur RRR        	  GI/ ABDOMEN:  Soft  Non tender    EXTREMITIES:   No Edema  No Clubbing  No Cyanosis     MUSCULOSKELETAL:  decreased Range of Motion all 4 extremities   	   SKIN: Normal  No Ecchymosis               LABS:  CBC Full  -  ( 27 Sep 2018 07:52 )  WBC Count : 5.41 K/uL  Hemoglobin : 11.9 g/dL  Hematocrit : 36.8 %  Platelet Count - Automated : 345 K/uL  Mean Cell Volume : 97.4 fl  Mean Cell Hemoglobin : 31.5 pg  Mean Cell Hemoglobin Concentration : 32.3 gm/dL  Auto Neutrophil # : x  Auto Lymphocyte # : x  Auto Monocyte # : x  Auto Eosinophil # : x  Auto Basophil # : x  Auto Neutrophil % : x  Auto Lymphocyte % : x  Auto Monocyte % : x  Auto Eosinophil % : x  Auto Basophil % : x    Urinalysis Basic - ( 26 Sep 2018 04:12 )    Color: Yellow / Appearance: Clear / S.020 / pH: x  Gluc: x / Ketone: Negative  / Bili: Negative / Urobili: Negative mg/dL   Blood: x / Protein: 30 mg/dL / Nitrite: Negative   Leuk Esterase: Moderate / RBC: 0-2 /HPF / WBC 6-10   Sq Epi: x / Non Sq Epi: Few / Bacteria: Few          143  |  108  |  21  ----------------------------<  93  4.1   |  29  |  1.09    Ca    9.2      27 Sep 2018 07:52  Phos  3.1       Mg     1.9         TPro  5.9<L>  /  Alb  3.3  /  TBili  0.4  /  DBili  x   /  AST  12  /  ALT  15  /  AlkPhos  52      Hemoglobin A1C:     LIVER FUNCTIONS - ( 25 Sep 2018 23:59 )  Alb: 3.3 g/dL / Pro: 5.9 g/dL / ALK PHOS: 52 U/L / ALT: 15 U/L DA / AST: 12 U/L / GGT: x           Vitamin B12   PT/INR - ( 25 Sep 2018 23:59 )   PT: 11.8 sec;   INR: 1.08 ratio         PTT - ( 25 Sep 2018 23:59 )  PTT:24.5 sec      RADIOLOGY      ANALYSIS AND PLAN:  This is an 83-year-old with an episode of unresponsiveness.  1.	For episode of unresponsiveness, what could be ascertained, it appears that the patient possibly looks bradycardic, received Atropine.  There is no clear history to suggest underlying epilepsy or seizure activity.  No signs on examination to suggest a new cerebrovascular accident had ensured.  2.	I would recommend telemetry evaluation and monitor the patient's heart rate.  3.	Monitor systolic blood pressure.  4.	Okay at present to hold Aricept, to see if there is a recurrence of the patient's bradycardia.  Less likely since the patient has been on Aricept for quite a number of years.  5.	For history of dementia, we will continue the patient on Namenda, unfortunately, we do not have extended release, so we will break down in 10 mg two times a day.  6.	For change in mental status most likely secondary to dementia made worse in the hospital setting, I would recommend continue the patient on her psychiatric medications.  7.	For history of hypothyroidism,   Continue Synthroid.  8.	Fall precautions.  9.	I spoke with the son, Ari.  He understands while in the hospital setting, the patient's mental status may deteriorate.  He will have an aide to stay with her at night.  10.	Attempted to contact the patient's outside neurologist at [phone].  There was no response present.   11.	Greater than 85 minutes of time was spent with the patient, plan of care, reviewing data, speaking to the family and multidisciplinary healthcare team.  12.	Ari Madden's telephone number is [phone] spoke to son today    Neurologic standpoint only cleared for discharge planning     Physical therapy evaluation as tolerated  OOB to chair/ambulation with assistance only if possible.    Greater than 45 minutes spent in direct patient care reviewing  the notes, lab data/ imaging , discussion with multidisciplinary team.

## 2018-09-27 NOTE — PROGRESS NOTE ADULT - SUBJECTIVE AND OBJECTIVE BOX
PROGRESS NOTE  Patient is a 83y old  Female who presents with a chief complaint of NEARSYNCOPE (27 Sep 2018 09:13)  Chart and available morning labs /imaging are reviewed electronically , urgent issues addressed . More information  is being added upon completion of rounds , when more information is collected and management discussed with consultants , medical staff and social service/case management on the floor   OVERNIGHT  No new issues reported by medical staff . All above noted Patient is resting in a bed comfortably .No distress noted   As per card cons repeat orthostatic assessment recommneded , Dr Oliveros doesn,t think syncopy was related to bradycardia( patient's HR is running at borderline 50-60 ,ok to resume aricept .Likely deghydration and volume depletion /hypotension caused vasovagal syncope Spoke to the aid at bedside and asked her to make sure patient drinks 6-8 glasses of water per day   HPI:  82 YO WF with hx of BREAST CA, S/P LUMPECTOMY IN 2016 ,HTN ,HYPOTHYROIDISM ,HYPERPARATHYROIDISM ,DEPRESSION ,DEMENTIA ,CAD/LBBB,S/P CTS SURGERY, CATARACT SX  was brought to ER for evaluation of nearsyncopal  episode ,witnessed by healthcare aid ,who called 911 EMS found the patient diaphoretic and bradycardic ,atropine was administrated with improvement of HR ,patient admitted to telemetry unit ,sinus bradycardia 56-64 is currently on monitor . Psychiatry evaluation is requested to review home medications as potential cause of bradycardia ,donepezil was held . Cardiology and neurology evaluations are requested .Admit to telemetry unit for monitoring ,send 3 sets of cardiac enzymes to rule out coronary event ,obtain ECHO to evaluate LVEF, cardiology consult, continue current managmnet,O2 supply ,anticoagulation plan as per cardiology consult Patient denies hx of similar episodes in a past .No signs of infection or sepsis found ,CT brain is negative for acute events .Found to have BUN elevation and iv fluids administrated ,po intake of fluids encouraged . (26 Sep 2018 06:47)  PAST MEDICAL & SURGICAL HISTORY:  Hyperparathyroidism  LBBB (left bundle branch block)  Depression  Malignant neoplasm of breast: h/o  Hypothyroidism  Hypertension  Dementia  Cataract: b/l   History of lumpectomy of right breast: 2016  Carpal tunnel syndrome: repair -right   MEDICATIONS  (STANDING):  aspirin  chewable 81 milliGRAM(s) Oral daily  buDESOnide  80 MICROgram(s)/formoterol 4.5 MICROgram(s) Inhaler 2 Puff(s) Inhalation two times a day  buPROPion XL . 300 milliGRAM(s) Oral daily  cholecalciferol 2000 Unit(s) Oral daily  famotidine    Tablet 20 milliGRAM(s) Oral daily  heparin  Injectable 5000 Unit(s) SubCutaneous every 12 hours  influenza   Vaccine 0.5 milliLiter(s) IntraMuscular once  letrozole 2.5 milliGRAM(s) Oral daily  levothyroxine 125 MICROGram(s) Oral daily  losartan 50 milliGRAM(s) Oral daily  memantine 10 milliGRAM(s) Oral two times a day  mirtazapine 15 milliGRAM(s) Oral at bedtime  naproxen 500 milliGRAM(s) Oral two times a day  QUEtiapine 25 milliGRAM(s) Oral two times a day  sodium chloride 0.45%. 1000 milliLiter(s) (50 mL/Hr) IV Continuous <Continuous>  MEDICATIONS  (PRN):  acetaminophen   Tablet .. 650 milliGRAM(s) Oral every 6 hours PRN Temp greater or equal to 38C (100.4F), Mild Pain (1 - 3)  melatonin 3 milliGRAM(s) Oral at bedtime PRN Insomnia  sodium chloride 0.65% Nasal 1 Spray(s) Both Nostrils two times a day PRN Nasal Congestion  OBJECTIVE  T(C): 36.6 (18 @ 09:53), Max: 37 (18 @ 14:14)  HR: 67 (18 @ 09:53) (61 - 76)  BP: 118/83 (18 @ 09:53) (118/83 - 167/80)  RR: 18 (18 @ 09:53) (16 - 18)  SpO2: 99% (18 @ 09:53) (96% - 99%)  Wt(kg): --  I&O's Summary  26 Sep 2018 07:01  -  27 Sep 2018 07:00  --------------------------------------------------------  IN: 1530 mL / OUT: 0 mL / NET: 1530 m  ROS-  10 systems are reviewed and are negative   NO SPECIFIC COMPLAINS     Physical Exam:  · Constitutional	detailed exam	  · Constitutional Details	well-developed; well-groomed; well-nourished; no distress	  · Eyes	detailed exam	  · Eyes Details	PERRL; EOMI; conjunctiva clear	  · ENMT	No oral lesions; no gross abnormalities	  · Neck	detailed exam 	  · Neck Details	normal; supple; no JVD	  · Breasts	detailed exam	  · Breasts Details	normal shape	  · Breasts Comments	S/P LUMPECTOMY 2016	  · Back	detailed exam 	  · Back Details	normal shape	  · Respiratory	detailed exam	  · Respiratory Details	normal; airway patent; breath sounds equal; good air movement; respirations non-labored; clear to auscultation bilaterally; no chest wall tenderness; no intercostal retractions	  · Cardiovascular	detailed exam	  · Cardiovascular Details	regular rate and rhythm 	  · Cardiovascular Details	positive S1; positive S2	  · Gastrointestinal	detailed exam	  · GI Normal	normal; soft; nontender; no distention; no masses palpable; bowel sounds normal; no rebound tenderness	  · Genitourinary	detailed exam 	  · Genitourinary Details Female	normal external genitalia	  · Rectal	patient refused 	  · Extremities	detailed exam 	  · Extremities Details	normal; no clubbing; no cyanosis; no pedal edema	  · Vascular	Equal and normal pulses (carotid, femoral, dorsalis pedis) 	  · Neurological	detailed exam	  · Neurological Details	alert and oriented x 3; responds to verbal commands; PERIODS OF CONFUSION	  · Skin	No lesions; no rash	  · Lymph Nodes	No lymphadedenopathy	  · Musculoskeletal	detailed exam	  · Musculoskeletal Details	normal; ROM intact; no joint swelling; no joint erythema	  · Psychiatric	detailed exam	  · Psychiatric Details	depressed; INSOMNIA	    LABS:                        11.9   5.41  )-----------( 345      ( 27 Sep 2018 07:52 )             36.8     143  |  108  |  21  ----------------------------<  93  4.1   |  29  |  1.09  Ca    9.2      27 Sep 2018 07:52  Phos  3.1       Mg     1.9       TPro  5.9<L>  /  Alb  3.3  /  TBili  0.4  /  DBili  x   /  AST  12  /  ALT  15  /  AlkPhos  52  -  CAPILLARY BLOOD GLUCOSE  PT/INR - ( 25 Sep 2018 23:59 )   PT: 11.8 sec;   INR: 1.08 ratio    PTT - ( 25 Sep 2018 23:59 )  PTT:24.5 sec  Urinalysis Basic - ( 26 Sep 2018 04:12 )  Color: Yellow / Appearance: Clear / S.020 / pH: x  Gluc: x / Ketone: Negative  / Bili: Negative / Urobili: Negative mg/dL   Blood: x / Protein: 30 mg/dL / Nitrite: Negative   Leuk Esterase: Moderate / RBC: 0-2 /HPF / WBC 6-10   Sq Epi: x / Non Sq Epi: Few / Bacteria: Few  RADIOLOGY & ADDITIONAL TESTS:< from: Xray Chest 1 View AP/PA (18 @ 00:16) >  EXAM:  XR CHEST AP OR PA 1V                        PROCEDURE DATE:  2018    INTERPRETATION:  EXAM: CHEST X-RAY.   CLINICAL INDICATION: Syncope today.   TECHNIQUE: Upright PA view.   PRIOR EXAM: 2013.   FINDINGS:    There a few scattered small opacities in the right upper lung which   could be related to an infiltrate. A right apical lung nodule is also   considered. Radiographic or CT follow-up are suggested. Rest of the lung   fields are unremarkable. A few surgical clips project over the right lung   base that were not seen on the prior study. Cardiac silhouette and   pulmonary vasculature are unremarkable. No significant bony abnormality   is seen.      IMPRESSION:     Subtle scattered opacities overlying the right upper lung as described   above.      < end of copied text >     reviewed elctronically  ASSESSMENT/PLAN:

## 2018-09-27 NOTE — PROGRESS NOTE ADULT - PROBLEM SELECTOR PLAN 2
RULED OUT FOR AMI Admit to telemetry unit for monitoring,send 3 sets of cardiac ensymes to rule out coronary event,obtain ECHO to evaluate LVEF,cardiology consult,continue current managmnet,O2 supply,anticoagulation plan as per cardiology consult

## 2018-09-27 NOTE — PROGRESS NOTE ADULT - SUBJECTIVE AND OBJECTIVE BOX
Chief Complaint: Syncope    Interval Events: No events overnight. No complaints.    Review of Systems:  General: No fevers, chills, weight loss or gain  Skin: No rashes, color changes  Cardiovascular: No chest pain, orthopnea  Respiratory: No shortness of breath, cough  Gastrointestinal: No nausea, abdominal pain  Genitourinary: No incontinence, pain with urination  Musculoskeletal: No pain, swelling, decreased range of motion  Neurological: No headache, weakness  Psychiatric: No depression, anxiety  Endocrine: No weight loss or gain, increased thirst  All other systems are comprehensively negative.    Physical Exam:  Vitals:        Vital Signs Last 24 Hrs  T(C): 36.4 (27 Sep 2018 05:13), Max: 37 (26 Sep 2018 14:14)  T(F): 97.5 (27 Sep 2018 05:13), Max: 98.6 (26 Sep 2018 14:14)  HR: 62 (27 Sep 2018 05:13) (61 - 76)  BP: 127/60 (27 Sep 2018 05:13) (127/60 - 167/80)  BP(mean): --  RR: 16 (27 Sep 2018 05:13) (16 - 18)  SpO2: 97% (27 Sep 2018 05:13) (96% - 98%)  General: NAD  HEENT: MMM  Neck: No JVD, no carotid bruit  Lungs: CTAB  CV: RRR, nl S1/S2, no M/R/G  Abdomen: S/NT/ND, +BS  Extremities: No LE edema, no cyanosis  Neuro: AAOx3, non-focal  Skin: No rash    Labs:                        11.9   5.41  )-----------( 345      ( 27 Sep 2018 07:52 )             36.8     09-27    143  |  108  |  21  ----------------------------<  93  4.1   |  29  |  1.09    Ca    9.2      27 Sep 2018 07:52  Phos  3.1     09-26  Mg     1.9     09-26    TPro  5.9<L>  /  Alb  3.3  /  TBili  0.4  /  DBili  x   /  AST  12  /  ALT  15  /  AlkPhos  52  09-25    CARDIAC MARKERS ( 26 Sep 2018 07:50 )  .001 ng/mL / x     / x     / x     / x      CARDIAC MARKERS ( 25 Sep 2018 23:59 )  .000 ng/mL / x     / 24 U/L / x     / 0.7 ng/mL      PT/INR - ( 25 Sep 2018 23:59 )   PT: 11.8 sec;   INR: 1.08 ratio         PTT - ( 25 Sep 2018 23:59 )  PTT:24.5 sec    Telemetry: Sinus rhythm, mild bradycardia

## 2018-09-27 NOTE — CONSULT NOTE ADULT - ASSESSMENT
MCKINLEY MOODYORA KAYLEIGH [facility] [phone]   ALLERGY Ceftin  CONTACT Son Ari SNOW  self [phone] Son Joe SNOW [phone]  REASON FOR VISIT   Initial evaluation/Pulmonary requested 9/27/2018 by Dr Houston    from Dr Braga   Patient examined chart reviewed  HOSPITAL ADMISSION [facility]  S 9/26/2018   PATIENT CAME  FROM (if information available)      VITALS/LABS                           9/27/2018 afeb 67 118/80 18 99%   9/27/2018 W 5.4 Hb 11.9 Plt 345 Na 143 K 4.1 CO2 29 Cr 1 G 93   9/26-9/27 Tr 1 n.2   9/26/2018 ECHO EF 55% Basal and inf wall hypokinetic Mild mr   9/26/2018 CXR 1) few scattered opac in RUL which could be related to an infiltrate   2) R apical lung nodule     REVIEW OF SYMPTOMS    Able to give ROS  Yes     RELIABLE No   CONSTITUTIONAL Weakness Yes  Chills No Vision changes No  ENDOCRINE No unexplained hair loss No heat or cold intolerance    ALLERGY No hives  Sore throat No   RESP Coughing blood no  Shortness of breath YES   NEURO No Headache  Confusion Pain neck No   CARDIAC No Chest pain No Palpitations   GI No Pain abdomen NO   Vomiting NO     PHYSICAL EXAM    HEENT Unremarkable PERRLA atraumatic   RESP Fair air entry EXP prolonged    Harsh breath sound Resp distres mild   CARDIAC S1 S2 No S3     NO JVD    ABDOMEN SOFT BS PRESENT NOT DISTENDED No hepatosplenomegaly PEDAL EDEMA present No calf tenderness  NO rash   GENERAL Not TOXIC looking    GLOBAL ISSUE/BEST PRACTICE:      PROBLEM: HOB elevation:   y            PROBLEM: Stress ulcer proph:    na                      PROBLEM: VTE prophylaxis:      [facility] [place]   PROBLEM: Glycemic control:    na  PROBLEM: Nutrition:    [facility] (9/26)   PROBLEM: Advanced directive: na     PROBLEM: Allergies:  na    HOSPITAL COURSE  9/26/2018 ADMISSION [facility] DR SYMONE HOUSTON  83 f PMH Htn Hypothyroidism LBBB Dementia was admitted 9/26/2018 with syncope Pulmonary consult was called 9/27/2018 for abn CXR EKG showed old LBBB 2 sets of C enz were neg anbd Cardio felt that syncope was vasovagal ECHO showed N lvsf and there were no valve issues Orthostatics were positive on 9/26     HPI Details 9/26/2018 admission [facility] S    Patient was at home. States "I must have passed out" . Aide called EMS who found patient awake, diaphoretic and bradycardic. Gave her atropine, and she became more responsive. Presently has no c/o.  Denies chest pain, sob, h/a. No palpitations. Denies passing out before      PROBLEMS  9/26/2018 ADMISSION [facility] SYMONE HOUSTON  SYNCOPE  Possible vasovagal attack  Orthostatic hypotension changes 9/26/2018  OPACITIES RUL   R APICAL NODULE      Past Medical History:  Dementia    Depression    Hyperparathyroidism    Hypertension    Hypothyroidism    LBBB (left bundle branch block)    Malignant neoplasm of breast  h/o.     Past Surgical History:  Carpal tunnel syndrome  repair -right 2008  Cataract  b/l 2006  History of lumpectomy of right breast  2016.    PATIENT DATA ASSESSMENT RECOMMENDATIONS   9/26/2018 ADMISSION [facility] SYMONE HOUSTON    PROBLEM/ASSESSMENT/PLAN    LUNG NODULE  ASSESSMENT/RECOMMENDATIONS  9/27/2018 Check CT Ch (Has HO breast CA)     PROBLEM/ASSESSMENT/PLAN    SYNCOPE  ASSESSMENT/RECOMMENDATIONS     Cardio felt that syncope was vasovagal ECHO showed N lvsf and there were no valve issues Orthostatics were positive on 9/26 9/27/2018 Will check V   duplex legs     TIME SPENT Over 55 minutes aggregate care time spent on encounter; activities included   direct patient care, counseling and/or coordinating care reviewing notes, lab data/ imaging , discussion with multidisciplinary team/ patient  /family. Risks, benefits, alternatives  discussed in detail.

## 2018-09-27 NOTE — PROGRESS NOTE ADULT - ASSESSMENT
The patient is an 83 year old female with a history of HTN, hypothyroidism, LBBB, dementia who presents with syncope.    Plan:  - Etiology of syncope likely vasovagal in origin given both BP and HR were low. Possibly orthostatic hypotension.  - ECG with underlying LBBB (old). Otherwise no predisposing features for syncope.  - Monitor on telemetry. No events thus far.  - Two sets of cardiac enzymes negative  - Echocardiogram read pending. Grossly normal LV systolic function, no significant valve issues.  - Orthostatics positive yesterday. Re-check today.  - Continue losartan for HTN. If orthostatics positive, then discontinue.  - Discharge planning

## 2018-09-28 ENCOUNTER — TRANSCRIPTION ENCOUNTER (OUTPATIENT)
Age: 83
End: 2018-09-28

## 2018-09-28 VITALS
HEART RATE: 70 BPM | DIASTOLIC BLOOD PRESSURE: 89 MMHG | RESPIRATION RATE: 17 BRPM | TEMPERATURE: 98 F | OXYGEN SATURATION: 96 % | SYSTOLIC BLOOD PRESSURE: 149 MMHG

## 2018-09-28 LAB
ANION GAP SERPL CALC-SCNC: 5 MMOL/L — SIGNIFICANT CHANGE UP (ref 5–17)
BUN SERPL-MCNC: 20 MG/DL — SIGNIFICANT CHANGE UP (ref 7–23)
CALCIUM SERPL-MCNC: 9.2 MG/DL — SIGNIFICANT CHANGE UP (ref 8.4–10.5)
CHLORIDE SERPL-SCNC: 106 MMOL/L — SIGNIFICANT CHANGE UP (ref 96–108)
CO2 SERPL-SCNC: 28 MMOL/L — SIGNIFICANT CHANGE UP (ref 22–31)
CREAT SERPL-MCNC: 1.02 MG/DL — SIGNIFICANT CHANGE UP (ref 0.5–1.3)
GLUCOSE SERPL-MCNC: 93 MG/DL — SIGNIFICANT CHANGE UP (ref 70–99)
HCT VFR BLD CALC: 34.6 % — SIGNIFICANT CHANGE UP (ref 34.5–45)
HGB BLD-MCNC: 11.3 G/DL — LOW (ref 11.5–15.5)
MCHC RBC-ENTMCNC: 31.6 PG — SIGNIFICANT CHANGE UP (ref 27–34)
MCHC RBC-ENTMCNC: 32.7 GM/DL — SIGNIFICANT CHANGE UP (ref 32–36)
MCV RBC AUTO: 96.6 FL — SIGNIFICANT CHANGE UP (ref 80–100)
NRBC # BLD: 0 /100 WBCS — SIGNIFICANT CHANGE UP (ref 0–0)
PLATELET # BLD AUTO: 314 K/UL — SIGNIFICANT CHANGE UP (ref 150–400)
POTASSIUM SERPL-MCNC: 4.2 MMOL/L — SIGNIFICANT CHANGE UP (ref 3.5–5.3)
POTASSIUM SERPL-SCNC: 4.2 MMOL/L — SIGNIFICANT CHANGE UP (ref 3.5–5.3)
RBC # BLD: 3.58 M/UL — LOW (ref 3.8–5.2)
RBC # FLD: 12.7 % — SIGNIFICANT CHANGE UP (ref 10.3–14.5)
SODIUM SERPL-SCNC: 139 MMOL/L — SIGNIFICANT CHANGE UP (ref 135–145)
WBC # BLD: 6.21 K/UL — SIGNIFICANT CHANGE UP (ref 3.8–10.5)
WBC # FLD AUTO: 6.21 K/UL — SIGNIFICANT CHANGE UP (ref 3.8–10.5)

## 2018-09-28 PROCEDURE — 71250 CT THORAX DX C-: CPT | Mod: 26

## 2018-09-28 PROCEDURE — 71045 X-RAY EXAM CHEST 1 VIEW: CPT

## 2018-09-28 PROCEDURE — 36415 COLL VENOUS BLD VENIPUNCTURE: CPT

## 2018-09-28 PROCEDURE — 97116 GAIT TRAINING THERAPY: CPT

## 2018-09-28 PROCEDURE — 87086 URINE CULTURE/COLONY COUNT: CPT

## 2018-09-28 PROCEDURE — 82553 CREATINE MB FRACTION: CPT

## 2018-09-28 PROCEDURE — 93306 TTE W/DOPPLER COMPLETE: CPT

## 2018-09-28 PROCEDURE — 83735 ASSAY OF MAGNESIUM: CPT

## 2018-09-28 PROCEDURE — 71250 CT THORAX DX C-: CPT

## 2018-09-28 PROCEDURE — 81001 URINALYSIS AUTO W/SCOPE: CPT

## 2018-09-28 PROCEDURE — 85730 THROMBOPLASTIN TIME PARTIAL: CPT

## 2018-09-28 PROCEDURE — 80048 BASIC METABOLIC PNL TOTAL CA: CPT

## 2018-09-28 PROCEDURE — 84443 ASSAY THYROID STIM HORMONE: CPT

## 2018-09-28 PROCEDURE — 99285 EMERGENCY DEPT VISIT HI MDM: CPT

## 2018-09-28 PROCEDURE — 80053 COMPREHEN METABOLIC PANEL: CPT

## 2018-09-28 PROCEDURE — 94640 AIRWAY INHALATION TREATMENT: CPT

## 2018-09-28 PROCEDURE — 85027 COMPLETE CBC AUTOMATED: CPT

## 2018-09-28 PROCEDURE — 93970 EXTREMITY STUDY: CPT

## 2018-09-28 PROCEDURE — 97161 PT EVAL LOW COMPLEX 20 MIN: CPT

## 2018-09-28 PROCEDURE — 97110 THERAPEUTIC EXERCISES: CPT

## 2018-09-28 PROCEDURE — 82550 ASSAY OF CK (CPK): CPT

## 2018-09-28 PROCEDURE — 84484 ASSAY OF TROPONIN QUANT: CPT

## 2018-09-28 PROCEDURE — 85610 PROTHROMBIN TIME: CPT

## 2018-09-28 PROCEDURE — 84100 ASSAY OF PHOSPHORUS: CPT

## 2018-09-28 PROCEDURE — 90686 IIV4 VACC NO PRSV 0.5 ML IM: CPT

## 2018-09-28 PROCEDURE — 70450 CT HEAD/BRAIN W/O DYE: CPT

## 2018-09-28 PROCEDURE — 93005 ELECTROCARDIOGRAM TRACING: CPT

## 2018-09-28 RX ORDER — ACETAMINOPHEN 500 MG
2 TABLET ORAL
Qty: 0 | Refills: 0 | COMMUNITY
Start: 2018-09-28

## 2018-09-28 RX ADMIN — INFLUENZA VIRUS VACCINE 0.5 MILLILITER(S): 15; 15; 15; 15 SUSPENSION INTRAMUSCULAR at 11:50

## 2018-09-28 RX ADMIN — Medication 81 MILLIGRAM(S): at 11:51

## 2018-09-28 RX ADMIN — Medication 125 MICROGRAM(S): at 05:35

## 2018-09-28 RX ADMIN — Medication 500 MILLIGRAM(S): at 05:35

## 2018-09-28 RX ADMIN — HEPARIN SODIUM 5000 UNIT(S): 5000 INJECTION INTRAVENOUS; SUBCUTANEOUS at 05:35

## 2018-09-28 RX ADMIN — Medication 2000 UNIT(S): at 11:51

## 2018-09-28 RX ADMIN — Medication 500 MILLIGRAM(S): at 06:15

## 2018-09-28 RX ADMIN — QUETIAPINE FUMARATE 25 MILLIGRAM(S): 200 TABLET, FILM COATED ORAL at 05:35

## 2018-09-28 RX ADMIN — BUPROPION HYDROCHLORIDE 300 MILLIGRAM(S): 150 TABLET, EXTENDED RELEASE ORAL at 11:51

## 2018-09-28 RX ADMIN — LETROZOLE 2.5 MILLIGRAM(S): 2.5 TABLET, FILM COATED ORAL at 11:51

## 2018-09-28 RX ADMIN — MEMANTINE HYDROCHLORIDE 10 MILLIGRAM(S): 10 TABLET ORAL at 05:37

## 2018-09-28 RX ADMIN — FAMOTIDINE 20 MILLIGRAM(S): 10 INJECTION INTRAVENOUS at 11:51

## 2018-09-28 RX ADMIN — LOSARTAN POTASSIUM 50 MILLIGRAM(S): 100 TABLET, FILM COATED ORAL at 05:35

## 2018-09-28 RX ADMIN — BUDESONIDE AND FORMOTEROL FUMARATE DIHYDRATE 2 PUFF(S): 160; 4.5 AEROSOL RESPIRATORY (INHALATION) at 06:41

## 2018-09-28 NOTE — DISCHARGE NOTE ADULT - SECONDARY DIAGNOSIS.
Bradycardia Dehydration, mild Dementia due to Alzheimer's disease Depression Hypertension Hypothyroidism

## 2018-09-28 NOTE — DISCHARGE NOTE ADULT - PATIENT PORTAL LINK FT
You can access the [facility] Patient Portal, offered by [facility], by registering with the following website: [email or web address]

## 2018-09-28 NOTE — DISCHARGE NOTE ADULT - CARE PROVIDERS DIRECT ADDRESSES
[email or web address]

## 2018-09-28 NOTE — DISCHARGE NOTE ADULT - PLAN OF CARE
encourage oral  hydration Vasovagal syncope likely to hypotension ,related to volume depletion due to  dehydration and poor po intake of fluids INCREASE ORAL INTAKE OF FLUID RECOMMENDED patient has asymptomatic borderline bradycardia during tele monitring and cleared by cardiologist for discharge .Unlikely potential cause of syncope Patient was advised to drink water /fluids 6-8 glasses a day and private aid was informed and asked to encourage po fluids continue home medications ,followup with private neuropsychiatrist followup with private neuropsychaitrst BP monitoring ,continue current antihypertensive meds, low salt diet ,followup with PMD continue current managemnet and medications

## 2018-09-28 NOTE — PROGRESS NOTE ADULT - SUBJECTIVE AND OBJECTIVE BOX
Neurology follow up note    PONCHOJULES [id number]      Interval History:      Patient feels ok no new complaints seen with AID    MEDICATIONS    acetaminophen   Tablet .. 650 milliGRAM(s) Oral every 6 hours PRN  aspirin  chewable 81 milliGRAM(s) Oral daily  buDESOnide  80 MICROgram(s)/formoterol 4.5 MICROgram(s) Inhaler 2 Puff(s) Inhalation two times a day  buPROPion XL . 300 milliGRAM(s) Oral daily  cholecalciferol 2000 Unit(s) Oral daily  donepezil 10 milliGRAM(s) Oral at bedtime  famotidine    Tablet 20 milliGRAM(s) Oral daily  heparin  Injectable 5000 Unit(s) SubCutaneous every 12 hours  influenza   Vaccine 0.5 milliLiter(s) IntraMuscular once  letrozole 2.5 milliGRAM(s) Oral daily  levothyroxine 125 MICROGram(s) Oral daily  losartan 50 milliGRAM(s) Oral daily  melatonin 3 milliGRAM(s) Oral at bedtime PRN  memantine 10 milliGRAM(s) Oral two times a day  mirtazapine 15 milliGRAM(s) Oral at bedtime  naproxen 500 milliGRAM(s) Oral two times a day  QUEtiapine 25 milliGRAM(s) Oral two times a day  sodium chloride 0.45%. 1000 milliLiter(s) IV Continuous <Continuous>  sodium chloride 0.65% Nasal 1 Spray(s) Both Nostrils two times a day PRN      Allergies    Ceftin (Pruritus; Rash)    Intolerances            Vital Signs Last 24 Hrs  T(C): 36.9 (28 Sep 2018 05:07), Max: 37 (27 Sep 2018 17:10)  T(F): 98.4 (28 Sep 2018 05:07), Max: 98.6 (27 Sep 2018 17:10)  HR: 70 (28 Sep 2018 05:07) (63 - 71)  BP: 149/89 (28 Sep 2018 05:07) (118/83 - 155/81)  BP(mean): --  RR: 17 (28 Sep 2018 05:07) (17 - 19)  SpO2: 96% (28 Sep 2018 05:07) (95% - 99%)    REVIEW OF SYSTEMS: Limited or unable to obtain secondary to patient's poor mental status.    Constitutional: No fever, chills, fatigue, weakness  Eyes: no eye pain, visual disturbances, or discharge  ENT:  No difficulty hearing, tinnitus, vertigo; No sinus or throat pain  Neck: No pain or stiffness  Respiratory: No cough, dyspnea, wheezing   Cardiovascular: No chest pain, palpitations,   Gastrointestinal: No abdominal or epigastric pain. No nausea, vomiting  No diarrhea or constipation.   Genitourinary: No dysuria, frequency, hematuria or incontinence  Neurological: No headaches, lightheadedness, vertigo, numbness or tremors  Psychiatric: No depression, anxiety, mood swings or difficulty sleeping  Musculoskeletal: No joint pain or swelling; No muscle, back or extremity pain  Skin: No itching, burning, rashes or lesions   Lymph Nodes: No enlarged glands  Endocrine: No heat or cold intolerance; No hair loss   Allergy and Immunologic: No hives or eczema    On Neurological Examination:    The patient is awake and alert.     Able to name objects.      Extraocular movements were intact.    Pupils were equal, round, and reactive bilaterally, 3 mm to 2 mm.    Speech was fluent.  Smile was symmetric.    Motor:  Bilateral upper and lower were 4+/5, would say age appropriate.    Sensory:  Bilateral upper and lower were intact to light touch.    No drift.  Finger-to-nose within normal limits.      Follow simple commands  Follow complex commands      GENERAL Exam: Nontoxic , No Acute Distress   	  HEENT:  normocephalic, atraumatic  		  LUNGS: Clear bilaterally      HEART: Normal S1S2   No murmur RRR        	  GI/ ABDOMEN:  Soft  Non tender    EXTREMITIES:   No Edema  No Clubbing  No Cyanosis     MUSCULOSKELETAL:  decreased Range of Motion all 4 extremities   	   SKIN: Normal  No Ecchymosis               LABS:  CBC Full  -  ( 28 Sep 2018 07:54 )  WBC Count : 6.21 K/uL  Hemoglobin : 11.3 g/dL  Hematocrit : 34.6 %  Platelet Count - Automated : 314 K/uL  Mean Cell Volume : 96.6 fl  Mean Cell Hemoglobin : 31.6 pg  Mean Cell Hemoglobin Concentration : 32.7 gm/dL  Auto Neutrophil # : x  Auto Lymphocyte # : x  Auto Monocyte # : x  Auto Eosinophil # : x  Auto Basophil # : x  Auto Neutrophil % : x  Auto Lymphocyte % : x  Auto Monocyte % : x  Auto Eosinophil % : x  Auto Basophil % : x      09-28    139  |  106  |  20  ----------------------------<  93  4.2   |  28  |  1.02    Ca    9.2      28 Sep 2018 07:54      Hemoglobin A1C:       Vitamin B12         RADIOLOGY    ANALYSIS AND PLAN:  This is an 83-year-old with an episode of unresponsiveness.  1.	For episode of unresponsiveness, what could be ascertained, it appears that the patient possibly looks bradycardic, received Atropine.  There is no clear history to suggest underlying epilepsy or seizure activity.  No signs on examination to suggest a new cerebrovascular accident had ensured.  2.	I would recommend telemetry evaluation and monitor the patient's heart rate.  3.	Monitor systolic blood pressure.  4.	Okay at present to hold Aricept, to see if there is a recurrence of the patient's bradycardia.  Less likely since the patient has been on Aricept for quite a number of years.  5.	For history of dementia, we will continue the patient on Namenda, unfortunately, we do not have extended release, so we will break down in 10 mg two times a day.  6.	For change in mental status most likely secondary to dementia made worse in the hospital setting, I would recommend continue the patient on her psychiatric medications.  7.	For history of hypothyroidism,   Continue Synthroid.  8.	Fall precautions.  9.	I spoke with the sonAri.  He understands while in the hospital setting, the patient's mental status may deteriorate.  He will have an aide to stay with her at night.  10.	Attempted to contact the patient's outside neurologist at [phone].  There was no response present.   11.	Greater than 85 minutes of time was spent with the patient, plan of care, reviewing data, speaking to the family and multidisciplinary healthcare team.  12.	Ari Madden's telephone number is [phone] spoke to son yesterday     Neurologic standpoint only cleared for discharge planning     Physical therapy evaluation as tolerated  OOB to chair/ambulation with assistance only if possible.    Greater than 45 minutes spent in direct patient care reviewing  the notes, lab data/ imaging , discussion with multidisciplinary team.

## 2018-09-28 NOTE — PROGRESS NOTE ADULT - SUBJECTIVE AND OBJECTIVE BOX
PROGRESS NOTE  Patient is a 83y old  Female who presents with a chief complaint of NEARSYNCOPE (28 Sep 2018 10:29)  Chart and available morning labs /imaging are reviewed electronically , urgent issues addressed . More information  is being added upon completion of rounds , when more information is collected and management discussed with consultants , medical staff and social service/case management on the floor   OVERNIGHT  No new issues reported by medical staff . All above noted Patient is resting in a bed comfortably .No distress noted   CLEARED FOR D/C BY PULM AND CARD CONS   HPI:  82 YO WF with hx of BREAST CA, S/P LUMPECTOMY IN 2016 ,HTN ,HYPOTHYROIDISM ,HYPERPARATHYROIDISM ,DEPRESSION ,DEMENTIA ,CAD/LBBB,S/P CTS SURGERY, CATARACT SX  was brought to ER for evaluation of nearsyncopal  episode ,witnessed by healthcare aid ,who called 911 EMS found the patient diaphoretic and bradycardic ,atropine was administrated with improvement of HR ,patient admitted to telemetry unit ,sinus bradycardia 56-64 is currently on monitor . Psychiatry evaluation is requested to review home medications as potential cause of bradycardia ,donepezil was held . Cardiology and neurology evaluations are requested .Admit to telemetry unit for monitoring ,send 3 sets of cardiac enzymes to rule out coronary event ,obtain ECHO to evaluate LVEF, cardiology consult, continue current managmnet,O2 supply ,anticoagulation plan as per cardiology consult Patient denies hx of similar episodes in a past .No signs of infection or sepsis found ,CT brain is negative for acute events .Found to have BUN elevation and iv fluids administrated ,po intake of fluids encouraged . (26 Sep 2018 06:47)  PAST MEDICAL & SURGICAL HISTORY:  Hyperparathyroidism  LBBB (left bundle branch block)  Depression  Malignant neoplasm of breast: h/o  Hypothyroidism  Hypertension  Dementia  Cataract: b/l 2006  History of lumpectomy of right breast: 2016  Carpal tunnel syndrome: repair -right 2008  MEDICATIONS  (STANDING):  aspirin  chewable 81 milliGRAM(s) Oral daily  buDESOnide  80 MICROgram(s)/formoterol 4.5 MICROgram(s) Inhaler 2 Puff(s) Inhalation two times a day  buPROPion XL . 300 milliGRAM(s) Oral daily  cholecalciferol 2000 Unit(s) Oral daily  donepezil 10 milliGRAM(s) Oral at bedtime  famotidine    Tablet 20 milliGRAM(s) Oral daily  heparin  Injectable 5000 Unit(s) SubCutaneous every 12 hours  letrozole 2.5 milliGRAM(s) Oral daily  levothyroxine 125 MICROGram(s) Oral daily  losartan 50 milliGRAM(s) Oral daily  memantine 10 milliGRAM(s) Oral two times a day  mirtazapine 15 milliGRAM(s) Oral at bedtime  naproxen 500 milliGRAM(s) Oral two times a day  QUEtiapine 25 milliGRAM(s) Oral two times a day  sodium chloride 0.45%. 1000 milliLiter(s) (50 mL/Hr) IV Continuous <Continuous>  MEDICATIONS  (PRN):  acetaminophen   Tablet .. 650 milliGRAM(s) Oral every 6 hours PRN Temp greater or equal to 38C (100.4F), Mild Pain (1 - 3)  melatonin 3 milliGRAM(s) Oral at bedtime PRN Insomnia  sodium chloride 0.65% Nasal 1 Spray(s) Both Nostrils two times a day PRN Nasal Congestion  OBJECTIVE  T(C): 36.9 (09-28-18 @ 05:07), Max: 37 (09-27-18 @ 17:10)  HR: 70 (09-28-18 @ 05:07) (63 - 71)  BP: 149/89 (09-28-18 @ 05:07) (128/78 - 155/81)  RR: 17 (09-28-18 @ 05:07) (17 - 19)  SpO2: 96% (09-28-18 @ 05:07) (95% - 98%)  Wt(kg): --  I&O's Summary  27 Sep 2018 07:01  -  28 Sep 2018 07:00  --------------------------------------------------------  IN: 1200 mL / OUT: 0 mL / NET: 1200 mL  REVIEW OF SYSTEMS:  CONSTITUTIONAL: No fever, weight loss, or fatigue  EYES: No eye pain, visual disturbances, or discharge  ENMT:   No sinus or throat pain  NECK: No pain or stiffness  RESPIRATORY: No cough, wheezing, chills or hemoptysis; No shortness of breath  CARDIOVASCULAR: No chest pain, palpitations, dizziness, or leg swelling  GASTROINTESTINAL: No abdominal pain. No nausea, vomiting; No diarrhea or constipation. No melena or hematochezia.  GENITOURINARY: No dysuria, frequency, hematuria, or incontinence  NEUROLOGICAL: No headaches, memory loss, loss of strength, numbness, or tremors  SKIN: No itching, burning, rashes, or lesions   MUSCULOSKELETAL: No joint pain or swelling; No muscle, back, or extremity pain  PHYSICAL EXAM:  Appearance: NAD. VS past 24 hrs -as above   HEENT:   Moist oral mucosa. Conjunctiva clear b/l.   Neck : supple  Respiratory: Lungs CTAB.  Gastrointestinal:  Soft, nontender. No rebound. No rigidity. BS present	  Cardiovascular: RRR ,S1S2 present  Neurologic: Non-focal. Moving all extremities.  Extremities: No edema. No erythema. No calf tenderness.  Skin: No rashes, No ecchymoses, No cyanosis.	  wounds ,skin lesions-See skin assesment flow sheet   LABS:                        11.3   6.21  )-----------( 314      ( 28 Sep 2018 07:54 )             34.6   09-28  139  |  106  |  20  ----------------------------<  93  4.2   |  28  |  1.02  Ca    9.2      28 Sep 2018 07:54  CAPILLARY BLOOD GLUCOSE  Culture - Urine (collected 26 Sep 2018 10:42)  Source: .Urine Clean Catch (Midstream)  Final Report (27 Sep 2018 11:19):    Culture grew 3 or more types of organisms which indicate    collection contamination; consider recollection only if clinically    indicated.  RADIOLOGY & ADDITIONAL TESTS:   reviewed elctronically  ASSESSMENT/PLAN: 	  Patient was seen and examined on a day of discharge . Plan of care , discharge medications and recommendations discussed with consultants and clearance for discharge obtained .Social service , case managment  and medical staff are aware of plan. Family is notified ,spoke to the son Hilario on a phone . Discharge summary  is  prepared electronically

## 2018-09-28 NOTE — DISCHARGE NOTE ADULT - HOSPITAL COURSE
82 YO WF with hx of BREAST CA, S/P LUMPECTOMY IN 2016 ,HTN ,HYPOTHYROIDISM ,HYPERPARATHYROIDISM ,DEPRESSION ,DEMENTIA ,CAD/LBBB,S/P CTS SURGERY, CATARACT SX  was brought to ER for evaluation of nearsyncopal  episode ,witnessed by healthcare aid ,who called 911 EMS found the patient diaphoretic and bradycardic ,atropine was administrated with improvement of HR ,patient admitted to telemetry unit ,sinus bradycardia 56-64 is currently on monitor . Psychiatry evaluation is requested to review home medications as potential cause of bradycardia ,donepezil was held . Cardiology and neurology evaluations are requested .Admit to telemetry unit for monitoring ,send 3 sets of cardiac enzymes to rule out coronary event ,obtain ECHO to evaluate LVEF, cardiology consult, continue current managmnet,O2 supply ,anticoagulation plan as per cardiology consult Patient denies hx of similar episodes in a past .No signs of infection or sepsis found ,CT brain is negative for acute events .Found to have BUN elevation and iv fluids administrated ,po intake of fluids encouraged . (26 Sep 2018 06:47)      US Transthoracic Echocardiogram w/Doppler Complete (09.26.18 @ 10:56) >  FINDINGS  Left Ventricle: Endocardium not well visualized, grossly, normal left   ventricular size and overall systolic function. LVEF estimated at 50-55%.   In the apical 2 chamber view, the basal to mid inferior wall appears   hypokinetic. Septal motion is suggestive of left bundlebranch block.   Mild diastolic dysfunction (stage I).  Right Ventricle: Grossly normal right ventricular size and function.  Left Atrium: Normal left atrium.  Right Atrium: Normal right atrium.  Mitral Valve: Normal mitral valve. Mild mitral regurgitation.  Aortic Valve: Grossly normal aortic valve. Minimal aortic insufficiency.  Tricuspid Valve: Normal tricuspid valve. Minimal tricuspid regurgitation.   Pulmonary artery systolic pressure estimated at 26 mmHg, assuming a right   atrial pressure of 3 mmHg, which is normal.  Pulmonic Valve: Pulmonic valve not well visualized; probably normal.  Pericardium/Pleura: No pericardial effusion.      CONCLUSIONS:  1. Technically difficult study.  2. Endocardium not well visualized, grossly, normal leftventricular size   and overall systolic function. LVEF estimated at 50-55%. In the apical 2   chamber view, the basal to mid inferior wall appears hypokinetic. Septal   motion is suggestive of left bundle branch block. Mild diastolic   dysfunction (stage I).  3. Mild mitral regurgitation.  4. Minimal aortic insufficiency.    < end of copied text >

## 2018-09-28 NOTE — DISCHARGE NOTE ADULT - CARE PLAN
Principal Discharge DX:	Syncope  Goal:	encourage oral  hydration  Assessment and plan of treatment:	Vasovagal syncope likely to hypotension ,related to volume depletion due to  dehydration and poor po intake of fluids INCREASE ORAL INTAKE OF FLUID RECOMMENDED  Secondary Diagnosis:	Bradycardia  Assessment and plan of treatment:	patient has asymptomatic borderline bradycardia during tele monitring and cleared by cardiologist for discharge .Unlikely potential cause of syncope  Secondary Diagnosis:	Dehydration, mild  Assessment and plan of treatment:	Patient was advised to drink water /fluids 6-8 glasses a day and private aid was informed and asked to encourage po fluids  Secondary Diagnosis:	Dementia due to Alzheimer's disease  Assessment and plan of treatment:	continue home medications ,followup with private neuropsychiatrist  Secondary Diagnosis:	Depression  Assessment and plan of treatment:	followup with private neuropsychaitrst  Secondary Diagnosis:	Hypertension  Assessment and plan of treatment:	BP monitoring ,continue current antihypertensive meds, low salt diet ,followup with PMD  Secondary Diagnosis:	Hypothyroidism  Assessment and plan of treatment:	continue current managemnet and medications

## 2018-09-28 NOTE — PROGRESS NOTE ADULT - SUBJECTIVE AND OBJECTIVE BOX
Await CT Chest results DW pt DW Dr Sanders Cont Rx Stable from Pulm standpoint Await CT Chest results DW pt DW Dr Houston Cont Rx Stable from Pulm standpoint           [facility] [phone]   ALLERGY Ceftin  CONTACT Son Ari SNOW  self [phone] Son Joe SNOW [phone]    VITALS/LABS                           9/28/2018 afeb 70 140/80 17 96%   9/28/2018 W 6.2 Hb 11.3 Plt 314 Na 139 K 4.2 CO2 28 Cr 1     REVIEW OF SYMPTOMS    Able to give ROS  Yes     RELIABLE No   CONSTITUTIONAL Weakness Yes  Chills No Vision changes No  ENDOCRINE No unexplained hair loss No heat or cold intolerance    ALLERGY No hives  Sore throat No   RESP Coughing blood no  Shortness of breath YES   NEURO No Headache  Confusion Pain neck No   CARDIAC No Chest pain No Palpitations   GI No Pain abdomen NO   Vomiting NO     PHYSICAL EXAM    HEENT Unremarkable PERRLA atraumatic   RESP Fair air entry EXP prolonged    Harsh breath sound Resp distres mild   CARDIAC S1 S2 No S3     NO JVD    ABDOMEN SOFT BS PRESENT NOT DISTENDED No hepatosplenomegaly PEDAL EDEMA present No calf tenderness  NO rash   GENERAL Not TOXIC looking    GLOBAL ISSUE/BEST PRACTICE:      PROBLEM: HOB elevation:   y            PROBLEM: Stress ulcer proph:    na                      PROBLEM: VTE prophylaxis:      hsc (9/26)   PROBLEM: Glycemic control:    na  PROBLEM: Nutrition:    eldridge (9/26)   PROBLEM: Advanced directive: na     PROBLEM: Allergies:  na    HOSPITAL COURSE  9/26/2018 ADMISSION [facility] S DR SYMONE HOUSTON  83 f PMH Htn Hypothyroidism LBBB Dementia was admitted 9/26/2018 with syncope Pulmonary consult was called 9/27/2018 for abn CXR EKG showed old LBBB 2 sets of C enz were neg anbd Cardio felt that syncope was vasovagal ECHO showed N lvsf and there were no valve issues Orthostatics were positive on 9/26       PROBLEMS  9/26/2018 ADMISSION [facility] S DR SYMONE HOUSTON  SYNCOPE  Possible vasovagal attack  Orthostatic hypotension changes 9/26/2018  OPACITIES RUL   R APICAL NODULE     PROBLEM/ASSESSMENT/PLAN    LUNG NODULE  CT ch nc 9/28/2018 cw 1/9/2013   Scar r cpa nsc tib nsc Mod h hernia No adr lesion  IMP Stable 2013  ASSESSMENT/RECOMMENDATIONS  9/28/2018 No interventioon needed  9/27/2018 Check CT Ch (Has HO breast CA)     PROBLEM/ASSESSMENT/PLAN    SYNCOPE  ASSESSMENT/RECOMMENDATIONS     Cardio felt that syncope was vasovagal ECHO showed N lvsf and there were no valve issues Orthostatics were positive on 9/26 9/28/2018 V duplex legs n   9/27/2018 Will check V   duplex legs     TIME SPENT Over 25 minutes aggregate care time spent on encounter; activities included   direct patient care, counseling and/or coordinating care reviewing notes, lab data/ imaging , discussion with multidisciplinary team/ patient  /family. Risks, benefits, alternatives  discussed in detail.

## 2018-09-28 NOTE — DISCHARGE NOTE ADULT - CARE PROVIDER_API CALL
Frankie Ramos), Internal Medicine  [address] [place] [address]  Phone: [phone]  Fax: [phone] Frankie Ramos), Internal Medicine  [address] [place] [address]  Phone: [phone]  Fax: [phone]    Vinny Oliveros), Cardiovascular Disease; Internal Medicine  [address] [facility]  [address]  [place] [address]  Phone: [phone]  Fax: [phone]

## 2018-09-28 NOTE — PROGRESS NOTE ADULT - NSHPATTENDINGPLANDISCUSS_GEN_ALL_CORE
MED STAFF ,SON ON A PHONE AND AID AT BEDSIDE
Dr Baldwin , Dr Oliveros , OUTPATIENT PCP, MED STAFF AND AID AT BEDSIDE

## 2018-09-28 NOTE — PROGRESS NOTE ADULT - SUBJECTIVE AND OBJECTIVE BOX
Chief Complaint: Syncope    Interval Events: No events overnight. No complaints.    Review of Systems:  General: No fevers, chills, weight loss or gain  Skin: No rashes, color changes  Cardiovascular: No chest pain, orthopnea  Respiratory: No shortness of breath, cough  Gastrointestinal: No nausea, abdominal pain  Genitourinary: No incontinence, pain with urination  Musculoskeletal: No pain, swelling, decreased range of motion  Neurological: No headache, weakness  Psychiatric: No depression, anxiety  Endocrine: No weight loss or gain, increased thirst  All other systems are comprehensively negative.    Physical Exam:  Vitals:        Vital Signs Last 24 Hrs  T(C): 36.9 (28 Sep 2018 05:07), Max: 37 (27 Sep 2018 17:10)  T(F): 98.4 (28 Sep 2018 05:07), Max: 98.6 (27 Sep 2018 17:10)  HR: 70 (28 Sep 2018 05:07) (63 - 71)  BP: 149/89 (28 Sep 2018 05:07) (118/83 - 155/81)  BP(mean): --  RR: 17 (28 Sep 2018 05:07) (17 - 19)  SpO2: 96% (28 Sep 2018 05:07) (95% - 99%)  General: NAD  HEENT: MMM  Neck: No JVD, no carotid bruit  Lungs: CTAB  CV: RRR, nl S1/S2, no M/R/G  Abdomen: S/NT/ND, +BS  Extremities: No LE edema, no cyanosis  Neuro: AAOx3, non-focal  Skin: No rash    Labs:                        11.3   6.21  )-----------( 314      ( 28 Sep 2018 07:54 )             34.6     09-28    139  |  106  |  20  ----------------------------<  93  4.2   |  28  |  1.02    Ca    9.2      28 Sep 2018 07:54              Telemetry: Sinus rhythm

## 2018-09-28 NOTE — DISCHARGE NOTE ADULT - MEDICATION SUMMARY - MEDICATIONS TO TAKE
I will START or STAY ON the medications listed below when I get home from the hospital:    Aspir 81 oral delayed release tablet  -- 1 tab(s) by mouth once a day  -- Indication: For cad    naproxen 500 mg oral tablet  -- 1 tab(s) by mouth 2 times a day  -- Indication: For oa    acetaminophen 325 mg oral tablet  -- 2 tab(s) by mouth every 6 hours, As needed, Temp greater or equal to 38C (100.4F), Mild Pain (1 - 3)  -- Indication: For fever or pain     losartan 100 mg oral tablet  -- 1 tab(s) by mouth once a day  -- Indication: For Htn    Remeron  -- 15 milligram(s) by mouth once a day (at bedtime)  -- Indication: For Depression    letrozole 2.5 mg oral tablet  -- 1 tab(s) by mouth once a day  -- Indication: For [place] ca    SEROquel 25 mg oral tablet  -- orally 2 times a day  -- Indication: For Depression    Symbicort 80 mcg-4.5 mcg/inh inhalation aerosol  -- 2 puff(s) inhaled 2 times a day  -- Indication: For copd    donepezil 23 mg oral tablet  -- 1 tab(s) by mouth once a day (at bedtime)  -- Indication: For Dementia due to Alzheimer's disease    raNITIdine 150 mg oral tablet  -- 1 tab(s) by mouth 2 times a day  -- Indication: For gerd    Namenda XR 28 mg oral capsule, extended release  -- 1 cap(s) by mouth once a day. AM  -- Indication: For Dementia due to Alzheimer's disease    Saline Nasal Mist  -- nasal 2 times a day  -- Indication: For rhinitis    Wellbutrin  -- 300 milligram(s) by mouth once a day  -- Indication: For Depression    Synthroid 125 mcg (0.125 mg) oral tablet  -- 1 tab(s) by mouth once a day  -- Indication: For Hypothyroidism    Vitamin D3 2000 intl units oral capsule  -- 1 cap(s) by mouth once a day  -- Indication: For vitamin d  defficiency

## 2018-09-28 NOTE — DISCHARGE NOTE ADULT - ADDITIONAL INSTRUCTIONS
As per cardiology consult impression --- Etiology of syncope likely vasovagal in origin given both BP and HR were low. Possibly orthostatic hypotension.  - ECG with underlying LBBB (old). Otherwise no predisposing features for syncope.  - Two sets of cardiac enzymes negative  - Echocardiogram read Grossly normal LV systolic function, no significant valve issues.  - Orthostatics initially positive, now negative  - Continue losartan for HTN

## 2018-09-28 NOTE — PROGRESS NOTE ADULT - ATTENDING COMMENTS
OOBTC/PT .Advance care planning was d/w the family.Pain is accessed and addresed.Pt was screened for signs of clinical depression .Appropriate referral made.Risk of falls accessed.Fall prevention d/w family .Pt is screened for tobacco and etoh,counseling was done for etoh and tobacco cessation.Use of narcotic pain meds was discussed.Pt is advised to use narcotic meds  wisely and to avoid overusing them.Plan of care d/w family and all questions answered to best of my knowledge Patient was seen and examined on a day of discharge . Plan of care , discharge medications and recommendations discussed with consultants and clearance for discharge obtained .Social service , case management  and medical staff are aware of plan. Family is notified. Discharge summary  is  prepared electronically
82 YO WF with hx of BREAST CA, S/P LUMPECTOMY IN 2016 ,HTN ,HYPOTHYROIDISM ,HYPERPARATHYROIDISM ,DEPRESSION ,DEMENTIA ,CAD/LBBB,S/P CTS SURGERY, CATARACT SX  was brought to ER for evaluation of nearsyncopal  episode ,witnessed by healthcare aid ,who called 911 EMS found the patient diaphoretic and bradycardic ,atropine was administrated with improvement of HR ,patient admitted to telemetry unit ,sinus bradycardia 56-64 is currently on monitor . Psychiatry evaluation is requested to review home medications as potential cause of bradycardia ,donepezil was held . Cardiology and neurology evaluations are requested .Admit to telemetry unit for monitoring ,send 3 sets of cardiac enzymes to rule out coronary event ,obtain ECHO to evaluate LVEF, cardiology consult, continue current managmnet,O2 supply ,anticoagulation plan as per cardiology consult Patient denies hx of similar episodes in a past .No signs of infection or sepsis found ,CT brain is negative for acute events .Found to have BUN elevation and iv fluids administrated ,po intake of fluids encouraged .

## 2018-10-01 ENCOUNTER — OUTPATIENT (OUTPATIENT)
Dept: OUTPATIENT SERVICES | Facility: HOSPITAL | Age: 83
LOS: 1 days | End: 2018-10-01
Payer: MEDICARE

## 2018-10-01 DIAGNOSIS — H26.9 UNSPECIFIED CATARACT: Chronic | ICD-10-CM

## 2018-10-01 DIAGNOSIS — G56.00 CARPAL TUNNEL SYNDROME, UNSPECIFIED UPPER LIMB: Chronic | ICD-10-CM

## 2018-10-01 DIAGNOSIS — Z90.11 ACQUIRED ABSENCE OF RIGHT BREAST AND NIPPLE: Chronic | ICD-10-CM

## 2018-10-01 DIAGNOSIS — M54.16 RADICULOPATHY, LUMBAR REGION: ICD-10-CM

## 2018-10-01 PROCEDURE — 77003 FLUOROGUIDE FOR SPINE INJECT: CPT

## 2018-10-01 PROCEDURE — 62323 NJX INTERLAMINAR LMBR/SAC: CPT

## 2018-10-10 ENCOUNTER — INPATIENT (INPATIENT)
Facility: HOSPITAL | Age: 83
LOS: 2 days | Discharge: HOME | DRG: 392 | End: 2018-10-13
Attending: INTERNAL MEDICINE | Admitting: INTERNAL MEDICINE
Payer: MEDICARE

## 2018-10-10 VITALS
SYSTOLIC BLOOD PRESSURE: 119 MMHG | HEIGHT: 64 IN | OXYGEN SATURATION: 96 % | TEMPERATURE: 99 F | RESPIRATION RATE: 16 BRPM | HEART RATE: 89 BPM | DIASTOLIC BLOOD PRESSURE: 67 MMHG | WEIGHT: 134.92 LBS

## 2018-10-10 DIAGNOSIS — F32.9 MAJOR DEPRESSIVE DISORDER, SINGLE EPISODE, UNSPECIFIED: ICD-10-CM

## 2018-10-10 DIAGNOSIS — I10 ESSENTIAL (PRIMARY) HYPERTENSION: ICD-10-CM

## 2018-10-10 DIAGNOSIS — F03.90 UNSPECIFIED DEMENTIA WITHOUT BEHAVIORAL DISTURBANCE: ICD-10-CM

## 2018-10-10 DIAGNOSIS — C50.919 MALIGNANT NEOPLASM OF UNSPECIFIED SITE OF UNSPECIFIED FEMALE BREAST: ICD-10-CM

## 2018-10-10 DIAGNOSIS — R19.7 DIARRHEA, UNSPECIFIED: ICD-10-CM

## 2018-10-10 DIAGNOSIS — H26.9 UNSPECIFIED CATARACT: Chronic | ICD-10-CM

## 2018-10-10 DIAGNOSIS — K52.9 NONINFECTIVE GASTROENTERITIS AND COLITIS, UNSPECIFIED: ICD-10-CM

## 2018-10-10 DIAGNOSIS — Z90.11 ACQUIRED ABSENCE OF RIGHT BREAST AND NIPPLE: Chronic | ICD-10-CM

## 2018-10-10 DIAGNOSIS — Z29.9 ENCOUNTER FOR PROPHYLACTIC MEASURES, UNSPECIFIED: ICD-10-CM

## 2018-10-10 DIAGNOSIS — G56.00 CARPAL TUNNEL SYNDROME, UNSPECIFIED UPPER LIMB: Chronic | ICD-10-CM

## 2018-10-10 DIAGNOSIS — F03.90 UNSPECIFIED DEMENTIA WITHOUT BEHAVIORAL DISTURBANCE: Chronic | ICD-10-CM

## 2018-10-10 LAB
ALBUMIN SERPL ELPH-MCNC: 3.1 G/DL — LOW (ref 3.3–5)
ALP SERPL-CCNC: 73 U/L — SIGNIFICANT CHANGE UP (ref 30–120)
ALT FLD-CCNC: 15 U/L DA — SIGNIFICANT CHANGE UP (ref 10–60)
ANION GAP SERPL CALC-SCNC: 7 MMOL/L — SIGNIFICANT CHANGE UP (ref 5–17)
APTT BLD: 29.8 SEC — SIGNIFICANT CHANGE UP (ref 27.5–37.4)
AST SERPL-CCNC: 12 U/L — SIGNIFICANT CHANGE UP (ref 10–40)
BASOPHILS # BLD AUTO: 0.04 K/UL — SIGNIFICANT CHANGE UP (ref 0–0.2)
BASOPHILS NFR BLD AUTO: 0.8 % — SIGNIFICANT CHANGE UP (ref 0–2)
BILIRUB SERPL-MCNC: 0.3 MG/DL — SIGNIFICANT CHANGE UP (ref 0.2–1.2)
BUN SERPL-MCNC: 30 MG/DL — HIGH (ref 7–23)
CALCIUM SERPL-MCNC: 8.8 MG/DL — SIGNIFICANT CHANGE UP (ref 8.4–10.5)
CHLORIDE SERPL-SCNC: 102 MMOL/L — SIGNIFICANT CHANGE UP (ref 96–108)
CO2 SERPL-SCNC: 27 MMOL/L — SIGNIFICANT CHANGE UP (ref 22–31)
CREAT SERPL-MCNC: 1.12 MG/DL — SIGNIFICANT CHANGE UP (ref 0.5–1.3)
EOSINOPHIL # BLD AUTO: 0.09 K/UL — SIGNIFICANT CHANGE UP (ref 0–0.5)
EOSINOPHIL NFR BLD AUTO: 1.9 % — SIGNIFICANT CHANGE UP (ref 0–6)
GLUCOSE SERPL-MCNC: 146 MG/DL — HIGH (ref 70–99)
HCT VFR BLD CALC: 41 % — SIGNIFICANT CHANGE UP (ref 34.5–45)
HGB BLD-MCNC: 13.3 G/DL — SIGNIFICANT CHANGE UP (ref 11.5–15.5)
IMM GRANULOCYTES NFR BLD AUTO: 0.8 % — SIGNIFICANT CHANGE UP (ref 0–1.5)
INR BLD: 1.08 RATIO — SIGNIFICANT CHANGE UP (ref 0.88–1.16)
LIDOCAIN IGE QN: 97 U/L — SIGNIFICANT CHANGE UP (ref 73–393)
LYMPHOCYTES # BLD AUTO: 1.23 K/UL — SIGNIFICANT CHANGE UP (ref 1–3.3)
LYMPHOCYTES # BLD AUTO: 25.9 % — SIGNIFICANT CHANGE UP (ref 13–44)
MCHC RBC-ENTMCNC: 31.1 PG — SIGNIFICANT CHANGE UP (ref 27–34)
MCHC RBC-ENTMCNC: 32.4 GM/DL — SIGNIFICANT CHANGE UP (ref 32–36)
MCV RBC AUTO: 95.8 FL — SIGNIFICANT CHANGE UP (ref 80–100)
MONOCYTES # BLD AUTO: 0.63 K/UL — SIGNIFICANT CHANGE UP (ref 0–0.9)
MONOCYTES NFR BLD AUTO: 13.3 % — SIGNIFICANT CHANGE UP (ref 2–14)
NEUTROPHILS # BLD AUTO: 2.72 K/UL — SIGNIFICANT CHANGE UP (ref 1.8–7.4)
NEUTROPHILS NFR BLD AUTO: 57.3 % — SIGNIFICANT CHANGE UP (ref 43–77)
PLATELET # BLD AUTO: 410 K/UL — HIGH (ref 150–400)
POTASSIUM SERPL-MCNC: 3.5 MMOL/L — SIGNIFICANT CHANGE UP (ref 3.5–5.3)
POTASSIUM SERPL-SCNC: 3.5 MMOL/L — SIGNIFICANT CHANGE UP (ref 3.5–5.3)
PROT SERPL-MCNC: 6.7 G/DL — SIGNIFICANT CHANGE UP (ref 6–8.3)
PROTHROM AB SERPL-ACNC: 11.8 SEC — SIGNIFICANT CHANGE UP (ref 9.8–12.7)
RBC # BLD: 4.28 M/UL — SIGNIFICANT CHANGE UP (ref 3.8–5.2)
RBC # FLD: 12.8 % — SIGNIFICANT CHANGE UP (ref 10.3–14.5)
SODIUM SERPL-SCNC: 136 MMOL/L — SIGNIFICANT CHANGE UP (ref 135–145)
WBC # BLD: 4.75 K/UL — SIGNIFICANT CHANGE UP (ref 3.8–10.5)
WBC # FLD AUTO: 4.75 K/UL — SIGNIFICANT CHANGE UP (ref 3.8–10.5)

## 2018-10-10 PROCEDURE — 71045 X-RAY EXAM CHEST 1 VIEW: CPT | Mod: 26

## 2018-10-10 PROCEDURE — 99285 EMERGENCY DEPT VISIT HI MDM: CPT

## 2018-10-10 PROCEDURE — 74177 CT ABD & PELVIS W/CONTRAST: CPT | Mod: 26

## 2018-10-10 RX ORDER — BUPROPION HYDROCHLORIDE 150 MG/1
300 TABLET, EXTENDED RELEASE ORAL DAILY
Qty: 0 | Refills: 0 | Status: DISCONTINUED | OUTPATIENT
Start: 2018-10-10 | End: 2018-10-13

## 2018-10-10 RX ORDER — BUPROPION HYDROCHLORIDE 150 MG/1
300 TABLET, EXTENDED RELEASE ORAL
Qty: 0 | Refills: 0 | COMMUNITY

## 2018-10-10 RX ORDER — BUDESONIDE AND FORMOTEROL FUMARATE DIHYDRATE 160; 4.5 UG/1; UG/1
2 AEROSOL RESPIRATORY (INHALATION)
Qty: 0 | Refills: 0 | Status: DISCONTINUED | OUTPATIENT
Start: 2018-10-10 | End: 2018-10-13

## 2018-10-10 RX ORDER — CHOLECALCIFEROL (VITAMIN D3) 125 MCG
1 CAPSULE ORAL
Qty: 0 | Refills: 0 | COMMUNITY

## 2018-10-10 RX ORDER — LACTOBACILLUS ACIDOPHILUS 100MM CELL
1 CAPSULE ORAL EVERY 8 HOURS
Qty: 0 | Refills: 0 | Status: DISCONTINUED | OUTPATIENT
Start: 2018-10-10 | End: 2018-10-13

## 2018-10-10 RX ORDER — ENOXAPARIN SODIUM 100 MG/ML
40 INJECTION SUBCUTANEOUS DAILY
Qty: 0 | Refills: 0 | Status: DISCONTINUED | OUTPATIENT
Start: 2018-10-10 | End: 2018-10-13

## 2018-10-10 RX ORDER — ASPIRIN/CALCIUM CARB/MAGNESIUM 324 MG
81 TABLET ORAL DAILY
Qty: 0 | Refills: 0 | Status: DISCONTINUED | OUTPATIENT
Start: 2018-10-10 | End: 2018-10-13

## 2018-10-10 RX ORDER — DONEPEZIL HYDROCHLORIDE 10 MG/1
1 TABLET, FILM COATED ORAL
Qty: 0 | Refills: 0 | COMMUNITY

## 2018-10-10 RX ORDER — LOSARTAN POTASSIUM 100 MG/1
1 TABLET, FILM COATED ORAL
Qty: 0 | Refills: 0 | COMMUNITY

## 2018-10-10 RX ORDER — DONEPEZIL HYDROCHLORIDE 10 MG/1
10 TABLET, FILM COATED ORAL AT BEDTIME
Qty: 0 | Refills: 0 | Status: DISCONTINUED | OUTPATIENT
Start: 2018-10-10 | End: 2018-10-13

## 2018-10-10 RX ORDER — SODIUM CHLORIDE 9 MG/ML
3 INJECTION INTRAMUSCULAR; INTRAVENOUS; SUBCUTANEOUS ONCE
Qty: 0 | Refills: 0 | Status: COMPLETED | OUTPATIENT
Start: 2018-10-10 | End: 2018-10-10

## 2018-10-10 RX ORDER — SODIUM CHLORIDE 0.65 %
0 AEROSOL, SPRAY (ML) NASAL
Qty: 0 | Refills: 0 | COMMUNITY

## 2018-10-10 RX ORDER — ASPIRIN/CALCIUM CARB/MAGNESIUM 324 MG
1 TABLET ORAL
Qty: 0 | Refills: 0 | COMMUNITY

## 2018-10-10 RX ORDER — ACETAMINOPHEN 500 MG
650 TABLET ORAL EVERY 6 HOURS
Qty: 0 | Refills: 0 | Status: DISCONTINUED | OUTPATIENT
Start: 2018-10-10 | End: 2018-10-13

## 2018-10-10 RX ORDER — MEMANTINE HYDROCHLORIDE 10 MG/1
1 TABLET ORAL
Qty: 0 | Refills: 0 | COMMUNITY

## 2018-10-10 RX ORDER — MEMANTINE HYDROCHLORIDE 10 MG/1
10 TABLET ORAL
Qty: 0 | Refills: 0 | Status: DISCONTINUED | OUTPATIENT
Start: 2018-10-10 | End: 2018-10-13

## 2018-10-10 RX ORDER — MIRTAZAPINE 45 MG/1
15 TABLET, ORALLY DISINTEGRATING ORAL AT BEDTIME
Qty: 0 | Refills: 0 | Status: DISCONTINUED | OUTPATIENT
Start: 2018-10-10 | End: 2018-10-13

## 2018-10-10 RX ORDER — LETROZOLE 2.5 MG/1
2.5 TABLET, FILM COATED ORAL DAILY
Qty: 0 | Refills: 0 | Status: DISCONTINUED | OUTPATIENT
Start: 2018-10-10 | End: 2018-10-13

## 2018-10-10 RX ORDER — SODIUM CHLORIDE 9 MG/ML
1000 INJECTION INTRAMUSCULAR; INTRAVENOUS; SUBCUTANEOUS ONCE
Qty: 0 | Refills: 0 | Status: COMPLETED | OUTPATIENT
Start: 2018-10-10 | End: 2018-10-10

## 2018-10-10 RX ORDER — SODIUM CHLORIDE 9 MG/ML
1000 INJECTION INTRAMUSCULAR; INTRAVENOUS; SUBCUTANEOUS
Qty: 0 | Refills: 0 | Status: DISCONTINUED | OUTPATIENT
Start: 2018-10-10 | End: 2018-10-13

## 2018-10-10 RX ORDER — BUDESONIDE AND FORMOTEROL FUMARATE DIHYDRATE 160; 4.5 UG/1; UG/1
2 AEROSOL RESPIRATORY (INHALATION)
Qty: 0 | Refills: 0 | COMMUNITY

## 2018-10-10 RX ORDER — LETROZOLE 2.5 MG/1
1 TABLET, FILM COATED ORAL
Qty: 0 | Refills: 0 | COMMUNITY

## 2018-10-10 RX ORDER — CIPROFLOXACIN LACTATE 400MG/40ML
400 VIAL (ML) INTRAVENOUS ONCE
Qty: 0 | Refills: 0 | Status: COMPLETED | OUTPATIENT
Start: 2018-10-10 | End: 2018-10-10

## 2018-10-10 RX ORDER — LOSARTAN POTASSIUM 100 MG/1
100 TABLET, FILM COATED ORAL DAILY
Qty: 0 | Refills: 0 | Status: DISCONTINUED | OUTPATIENT
Start: 2018-10-10 | End: 2018-10-13

## 2018-10-10 RX ORDER — IOHEXOL 300 MG/ML
30 INJECTION, SOLUTION INTRAVENOUS ONCE
Qty: 0 | Refills: 0 | Status: COMPLETED | OUTPATIENT
Start: 2018-10-10 | End: 2018-10-10

## 2018-10-10 RX ORDER — LEVOTHYROXINE SODIUM 125 MCG
125 TABLET ORAL DAILY
Qty: 0 | Refills: 0 | Status: DISCONTINUED | OUTPATIENT
Start: 2018-10-10 | End: 2018-10-13

## 2018-10-10 RX ORDER — CIPROFLOXACIN LACTATE 400MG/40ML
400 VIAL (ML) INTRAVENOUS EVERY 12 HOURS
Qty: 0 | Refills: 0 | Status: DISCONTINUED | OUTPATIENT
Start: 2018-10-11 | End: 2018-10-13

## 2018-10-10 RX ORDER — FAMOTIDINE 10 MG/ML
20 INJECTION INTRAVENOUS
Qty: 0 | Refills: 0 | Status: DISCONTINUED | OUTPATIENT
Start: 2018-10-10 | End: 2018-10-13

## 2018-10-10 RX ORDER — QUETIAPINE FUMARATE 200 MG/1
25 TABLET, FILM COATED ORAL
Qty: 0 | Refills: 0 | Status: DISCONTINUED | OUTPATIENT
Start: 2018-10-10 | End: 2018-10-13

## 2018-10-10 RX ORDER — MIRTAZAPINE 45 MG/1
15 TABLET, ORALLY DISINTEGRATING ORAL
Qty: 0 | Refills: 0 | COMMUNITY

## 2018-10-10 RX ORDER — RANITIDINE HYDROCHLORIDE 150 MG/1
1 TABLET, FILM COATED ORAL
Qty: 0 | Refills: 0 | COMMUNITY

## 2018-10-10 RX ORDER — METRONIDAZOLE 500 MG
500 TABLET ORAL ONCE
Qty: 0 | Refills: 0 | Status: COMPLETED | OUTPATIENT
Start: 2018-10-10 | End: 2018-10-10

## 2018-10-10 RX ORDER — ONDANSETRON 8 MG/1
4 TABLET, FILM COATED ORAL EVERY 6 HOURS
Qty: 0 | Refills: 0 | Status: DISCONTINUED | OUTPATIENT
Start: 2018-10-10 | End: 2018-10-13

## 2018-10-10 RX ORDER — METRONIDAZOLE 500 MG
500 TABLET ORAL EVERY 8 HOURS
Qty: 0 | Refills: 0 | Status: DISCONTINUED | OUTPATIENT
Start: 2018-10-10 | End: 2018-10-13

## 2018-10-10 RX ORDER — CHOLESTYRAMINE 4 G/9G
4 POWDER, FOR SUSPENSION ORAL
Qty: 0 | Refills: 0 | Status: DISCONTINUED | OUTPATIENT
Start: 2018-10-10 | End: 2018-10-13

## 2018-10-10 RX ORDER — LEVOTHYROXINE SODIUM 125 MCG
1 TABLET ORAL
Qty: 0 | Refills: 0 | COMMUNITY

## 2018-10-10 RX ORDER — QUETIAPINE FUMARATE 200 MG/1
0 TABLET, FILM COATED ORAL
Qty: 0 | Refills: 0 | COMMUNITY

## 2018-10-10 RX ADMIN — Medication 100 MILLIGRAM(S): at 21:35

## 2018-10-10 RX ADMIN — QUETIAPINE FUMARATE 25 MILLIGRAM(S): 200 TABLET, FILM COATED ORAL at 18:09

## 2018-10-10 RX ADMIN — SODIUM CHLORIDE 3 MILLILITER(S): 9 INJECTION INTRAMUSCULAR; INTRAVENOUS; SUBCUTANEOUS at 12:15

## 2018-10-10 RX ADMIN — Medication 1 TABLET(S): at 21:34

## 2018-10-10 RX ADMIN — DONEPEZIL HYDROCHLORIDE 10 MILLIGRAM(S): 10 TABLET, FILM COATED ORAL at 21:35

## 2018-10-10 RX ADMIN — Medication 100 MILLIGRAM(S): at 16:12

## 2018-10-10 RX ADMIN — ENOXAPARIN SODIUM 40 MILLIGRAM(S): 100 INJECTION SUBCUTANEOUS at 18:09

## 2018-10-10 RX ADMIN — SODIUM CHLORIDE 75 MILLILITER(S): 9 INJECTION INTRAMUSCULAR; INTRAVENOUS; SUBCUTANEOUS at 21:39

## 2018-10-10 RX ADMIN — SODIUM CHLORIDE 1000 MILLILITER(S): 9 INJECTION INTRAMUSCULAR; INTRAVENOUS; SUBCUTANEOUS at 12:15

## 2018-10-10 RX ADMIN — MIRTAZAPINE 15 MILLIGRAM(S): 45 TABLET, ORALLY DISINTEGRATING ORAL at 21:34

## 2018-10-10 RX ADMIN — FAMOTIDINE 20 MILLIGRAM(S): 10 INJECTION INTRAVENOUS at 18:09

## 2018-10-10 RX ADMIN — MEMANTINE HYDROCHLORIDE 10 MILLIGRAM(S): 10 TABLET ORAL at 18:09

## 2018-10-10 RX ADMIN — SODIUM CHLORIDE 1000 MILLILITER(S): 9 INJECTION INTRAMUSCULAR; INTRAVENOUS; SUBCUTANEOUS at 13:45

## 2018-10-10 RX ADMIN — Medication 200 MILLIGRAM(S): at 16:46

## 2018-10-10 RX ADMIN — IOHEXOL 30 MILLILITER(S): 300 INJECTION, SOLUTION INTRAVENOUS at 12:37

## 2018-10-10 NOTE — H&P ADULT - PROBLEM SELECTOR PLAN 1
stool studies , GI consult called ,IV HYDRATION ,moniotor electrolytes closely ,will start immodium if negative for c.diff

## 2018-10-10 NOTE — ED ADULT NURSE REASSESSMENT NOTE - GENERAL PATIENT STATE
comfortable appearance/family/SO at bedside
family/SO at bedside/comfortable appearance
comfortable appearance

## 2018-10-10 NOTE — ED ADULT NURSE NOTE - OBJECTIVE STATEMENT
Pt BIBA from home Awake, cooperative and calm but confused , Hx of dementia. Pt reported having episodes of diarrhea started over the weekend as per her caregiver.

## 2018-10-10 NOTE — H&P ADULT - PROBLEM SELECTOR PLAN 2
on iv cipro and flagyl Admitted for septic workup and evaluation,send blood and urine cx,serial lactate levels,monitor vitals closley,ivfs hydration,monitor urine output and renal profile,iv abx initiated

## 2018-10-10 NOTE — CONSULT NOTE ADULT - SUBJECTIVE AND OBJECTIVE BOX
Chief Complaint:  Patient is a 83y old  Female who presents with a chief complaint of diarrhea( 82 YO WF with hx of BREAST CA, S/P LUMPECTOMY IN 2016 ,HTN ,HYPOTHYROIDISM ,HYPERPARATHYROIDISM ,DEPRESSION ,DEMENTIA ,CAD/LBBB,S/P CTS SURGERY, CATARACT SX ,recent admission with vasovagal syncope due to dehydration presents with diarrhea for the past 5 days ,associated with nausea, no vomiting. She complained of abdominal pain to aide a few days ago,has a poor po intake last few days ,  no vomiting. no fevers. Has not taken anything for pain or symptoms. patient is a poor historian , but able to recognise me and answer simple questions ,she has  history of dementia, no mental status changes per aide .CT abdomen was done and demonstrated proctocolitis ,admitted for GI workup Admitted for septic workup and evaluation,send blood and urine cx,serial lactate levels,monitor vitals closley,ivfs hydration,monitor urine output and renal profile,iv abx initiated -on cipro and falgyl Palliative care consult requested ,to discuss advance directives and complete /[facility]       Review of Systems:  · General	not applicable	  · Skin/Breast	not applicable	  · Ophthalmologic	not applicable	  · ENMT	not applicable	  · Respiratory and Thorax	not applicable	  · Cardiovascular	not applicable	  · Gastrointestinal	not applicable	  · Genitourinary	not applicable	  · Musculoskeletal	not applicable	  · Neurological	not applicable	  · Psychiatric	not applicable	  · Hematology/Lymphatics	not applicable	  · Endocrine	not applicable	  · Allergic/Immunologic	not applicable	  · Additional ROS	ROS is unobtainable due to  confusion	  now in snf resident with diarrhea    Allergies:  Ceftin (Pruritus; Rash)      Medications:  acetaminophen   Tablet .. 650 milliGRAM(s) Oral every 6 hours PRN  aspirin enteric coated 81 milliGRAM(s) Oral daily  buDESOnide  80 MICROgram(s)/formoterol 4.5 MICROgram(s) Inhaler 2 Puff(s) Inhalation two times a day  buPROPion XL . 300 milliGRAM(s) Oral daily  cholestyramine Powder (Sugar-Free) 4 Gram(s) Oral two times a day  donepezil 10 milliGRAM(s) Oral at bedtime  enoxaparin Injectable 40 milliGRAM(s) SubCutaneous daily  famotidine    Tablet 20 milliGRAM(s) Oral two times a day  lactobacillus acidophilus 1 Tablet(s) Oral every 8 hours  letrozole 2.5 milliGRAM(s) Oral daily  levothyroxine 125 MICROGram(s) Oral daily  losartan 100 milliGRAM(s) Oral daily  memantine 10 milliGRAM(s) Oral two times a day  metroNIDAZOLE  IVPB 500 milliGRAM(s) IV Intermittent every 8 hours  mirtazapine 15 milliGRAM(s) Oral at bedtime  ondansetron Injectable 4 milliGRAM(s) IV Push every 6 hours PRN  QUEtiapine 25 milliGRAM(s) Oral two times a day  sodium chloride 0.9%. 1000 milliLiter(s) IV Continuous <Continuous>      PMHX/PSHX:  Hyperparathyroidism  LBBB (left bundle branch block)  Depression  Malignant neoplasm of breast  CAD (coronary artery disease)  Hypothyroidism  Hypertension  Dementia  Dementia  Cataract  History of lumpectomy of right breast  Carpal tunnel syndrome      Family history:  Family history of breast cancer  Family history of heart disease  Family history of lymphoma (Child)      Social History:     ROS:     General:  No wt loss, fevers, chills, night sweats, fatigue,   Eyes:  Good vision, no reported pain  ENT:  No sore throat, pain, runny nose, dysphagia  CV:  No pain, palpitations, hypo/hypertension  Resp:  No dyspnea, cough, tachypnea, wheezing  GI:  No pain, No nausea, No vomiting, No diarrhea, No constipation, No weight loss, No fever, No pruritis, No rectal bleeding, No tarry stools, No dysphagia,  :  No pain, bleeding, incontinence, nocturia  Muscle:  No pain, weakness  Neuro:  No weakness, tingling, memory problems  Psych:  No fatigue, insomnia, mood problems, depression  Endocrine:  No polyuria, polydipsia, cold/heat intolerance  Heme:  No petechiae, ecchymosis, easy bruisability  Skin:  No rash, tattoos, scars, edema      PHYSICAL EXAM:   Vital Signs:  Vital Signs Last 24 Hrs  T(C): 36.6 (10 Oct 2018 16:44), Max: 37.3 (10 Oct 2018 11:28)  T(F): 97.8 (10 Oct 2018 16:44), Max: 99.2 (10 Oct 2018 11:28)  HR: 71 (10 Oct 2018 16:44) (67 - 89)  BP: 142/74 (10 Oct 2018 16:44) (119/67 - 142/74)  BP(mean): --  RR: 16 (10 Oct 2018 11:28) (16 - 16)  SpO2: 96% (10 Oct 2018 16:44) (96% - 98%)  Daily Height in cm: 162.56 (10 Oct 2018 11:28)    Daily     GENERAL:  Appears stated age, well-groomed, well-nourished, no distress  HEENT:  NC/AT,  conjunctivae clear and pink, no thyromegaly, nodules, adenopathy, no JVD, sclera -anicteric  CHEST:  Full & symmetric excursion, no increased effort, breath sounds clear  HEART:  Regular rhythm, S1, S2, no murmur/rub/S3/S4, no abdominal bruit, no edema  ABDOMEN:  Soft, non-tender, non-distended, normoactive bowel sounds,  no masses ,no hepato-splenomegaly, no signs of chronic liver disease  EXTEREMITIES:  no cyanosis,clubbing or edema  SKIN:  No rash/erythema/ecchymoses/petechiae/wounds/abscess/warm/dry  NEURO:  Alert, oriented, no asterixis, no tremor, no encephalopathy    LABS:                        13.3   4.75  )-----------( 410      ( 10 Oct 2018 12:42 )             41.0     10-10    136  |  102  |  30<H>  ----------------------------<  146<H>  3.5   |  27  |  1.12    Ca    8.8      10 Oct 2018 12:42    TPro  6.7  /  Alb  3.1<L>  /  TBili  0.3  /  DBili  x   /  AST  12  /  ALT  15  /  AlkPhos  73  10-10    LIVER FUNCTIONS - ( 10 Oct 2018 12:42 )  Alb: 3.1 g/dL / Pro: 6.7 g/dL / ALK PHOS: 73 U/L / ALT: 15 U/L DA / AST: 12 U/L / GGT: x           PT/INR - ( 10 Oct 2018 12:42 )   PT: 11.8 sec;   INR: 1.08 ratio         PTT - ( 10 Oct 2018 12:42 )  PTT:29.8 sec    Amylase Serum--      Lipase serum97       Ammonia--      Imaging:

## 2018-10-10 NOTE — H&P ADULT - NSHPLABSRESULTS_GEN_ALL_CORE
< from: CT Abdomen and Pelvis w/ Oral Cont and w/ IV Cont (10.10.18 @ 14:41) >    EXAM:  CT ABDOMEN AND PELVIS OC IC                                  PROCEDURE DATE:  10/10/2018          INTERPRETATION:  CT ABDOMEN AND PELVIS    CLINICAL INFORMATION:  Left lower quadrant abdominal pain and diarrhea.    PROCEDURE:  Using multislice helical CT, oral contrast, and following the   intravenous administration of 95 cc Omnipaque 300 , 3 mm sections were   obtained from the domes of the diaphragm to the ischial tuberosities.    Multiplanar MPR's were performed.    COMPARISON: CT scan abdomen pelvis 6/19/2018.    FINDINGS:      The liver, spleen and gallbladder appear unremarkable.   The adrenal glands and pancreas are unremarkable in appearance.    There are bilateral renal cysts. There is a small indeterminate hypodense   lesion at the lower pole of the right kidney.  No hydroureteronephrosis is noted.    There is arteriosclerotic calcification of the abdominal aorta, which is   normal in caliber.  No enlarged retroperitoneal lymphadenopathy is noted.    There is a moderatesize hiatal hernia. There is possible gastric wall   thickening, correlate for gastritis.  There is fluid filled and liquid feces within the colon. There is mild   bowel wall thickening and mucosal hyperemia involving the rectosigmoid   colon, which may be associated with a proctocolitis.  Colonic diverticulosis is noted without CT evidence of diverticulitis.  No bowel obstruction is noted.  No localized intra-abdominal fluid collection or pneumoperitoneum is   noted.    There is a small fat-containing periumbilical hernia.    Urinary bladder appears unremarkable.  No pelvic mass or free fluid is noted.    There are multilevel degenerative changes of the lower thoracic and   lumbar spine.  There is partial ankylosis right sacroiliac joint.    Impression:    CT findings as discussed, which may be associated with a proctocolitis,   correlate clinically.    Colonic diverticulosis.  No bowel obstruction or internal fluid collection noted.    < end of copied text > < from: CT Abdomen and Pelvis w/ Oral Cont and w/ IV Cont (10.10.18 @ 14:41) >  EXAM:  CT ABDOMEN AND PELVIS OC IC                        PROCEDURE DATE:  10/10/2018    INTERPRETATION:  CT ABDOMEN AND PELVIS  CLINICAL INFORMATION:  Left lower quadrant abdominal pain and diarrhea.  PROCEDURE:  Using multislice helical CT, oral contrast, and following the   intravenous administration of 95 cc Omnipaque 300 , 3 mm sections were   obtained from the domes of the diaphragm to the ischial tuberosities.    Multiplanar MPR's were performed.  COMPARISON: CT scan abdomen pelvis 6/19/2018.  FINDINGS:    The liver, spleen and gallbladder appear unremarkable.   The adrenal glands and pancreas are unremarkable in appearance.  There are bilateral renal cysts. There is a small indeterminate hypodense   lesion at the lower pole of the right kidney.  No hydroureteronephrosis is noted.  There is arteriosclerotic calcification of the abdominal aorta, which is   normal in caliber.  No enlarged retroperitoneal lymphadenopathy is noted.  There is a moderatesize hiatal hernia. There is possible gastric wall   thickening, correlate for gastritis.  There is fluid filled and liquid feces within the colon. There is mild   bowel wall thickening and mucosal hyperemia involving the rectosigmoid   colon, which may be associated with a proctocolitis.  Colonic diverticulosis is noted without CT evidence of diverticulitis.  No bowel obstruction is noted.  No localized intra-abdominal fluid collection or pneumoperitoneum is   noted.  There is a small fat-containing periumbilical hernia.  Urinary bladder appears unremarkable.  No pelvic mass or free fluid is noted.  There are multilevel degenerative changes of the lower thoracic and   lumbar spine.  There is partial ankylosis right sacroiliac joint.  Impression:  CT findings as discussed, which may be associated with a proctocolitis,   correlate clinically.  Colonic diverticulosis.  No bowel obstruction or internal fluid collection noted.  < end of copied text >

## 2018-10-10 NOTE — ED PROVIDER NOTE - PROGRESS NOTE DETAILS
Attending Contribution to Care: 82 y/o F pt with hx of dementia BIBA c/o diarrhea, abd pain, nausea, and decreased appetite for the past 2 days as per aide. Pt was recently in the hospital for dehydration. Diarrhea was watery in nature. Denies blood in stool. Pt was supposed to go for an appointment with her pmd today.   PE:  demented, vitals stable. abd soft and nontender  Impression: diarrhea r/o colitis  Plan: ct abd, labs resting comfortably. appears to be in no distress. waiting for CT CT results discussed with patient and aide. will start IV antibiotics due to suspected proctocoliits. Aide does not feel comfortable discharging patient home, especially since patient has not been eating. Spoke with Dr. Sanders who kindly accepts patient for admission

## 2018-10-10 NOTE — H&P ADULT - MUSCULOSKELETAL
not applicable detailed exam no calf tenderness/no joint warmth/no joint swelling/no joint erythema/ROM intact

## 2018-10-10 NOTE — ED PROVIDER NOTE - OBJECTIVE STATEMENT
presents with diarrhea for the past 5 days. +nausea, no vomiting. complained of abdominal pain to aide a few days ago. eating less. ate 1 slice of bread 2 days ago, 1 slice of bread yesterday, and small bowl of cereal today. no vomiting. no fevers. has not taken anything for pain or symptoms. history of dementia, no mental status changes per aide  PCP Radha

## 2018-10-10 NOTE — ED PROVIDER NOTE - MEDICAL DECISION MAKING DETAILS
diarrhea for 4-5 days. LLQ tenderness to palpation. recently here for dehydration a couple weeks ago. history of diverticulitis. will order CT with contrast, CBC, CMP, lipase, UA, and give IV fluids. declines pain meds

## 2018-10-10 NOTE — H&P ADULT - ATTENDING COMMENTS
82 YO WF with hx of BREAST CA, S/P LUMPECTOMY IN 2016 ,HTN ,HYPOTHYROIDISM ,HYPERPARATHYROIDISM ,DEPRESSION ,DEMENTIA ,CAD/LBBB,S/P CTS SURGERY, CATARACT SX ,recent admission with vasovagal syncope due to dehydration presents with diarrhea for the past 5 days ,associated with nausea, no vomiting. She complained of abdominal pain to aide a few days ago,has a poor po intake last few days ,  no vomiting. no fevers. Has not taken anything for pain or symptoms. patient is a poor historian , but able to recognise me and answer simple questions ,she has  history of dementia, no mental status changes per aide .CT abdomen was done and demonstrated proctocolitis ,admitted for GI workup Admitted for septic workup and evaluation,send blood and urine cx,serial lactate levels,monitor vitals windy nichols hydration,monitor urine output and renal profile,iv abx initiated -on cipro and falgyl Palliative care consult requested ,to discuss advance directives and complete /update [facility]

## 2018-10-10 NOTE — H&P ADULT - RS GEN PE MLT RESP DETAILS PC
normal/breath sounds equal/respirations non-labored/clear to auscultation bilaterally/good air movement/airway patent

## 2018-10-10 NOTE — H&P ADULT - HISTORY OF PRESENT ILLNESS
84 YO WF with hx of BREAST CA, S/P LUMPECTOMY IN 2016 ,HTN ,HYPOTHYROIDISM ,HYPERPARATHYROIDISM ,DEPRESSION ,DEMENTIA ,CAD/LBBB,S/P CTS SURGERY, CATARACT SX ,recent admission with vasovagal syncope due to dehydration presents with diarrhea for the past 5 days ,associated with nausea, no vomiting. She complained of abdominal pain to aide a few days ago,has a poor po intake last few days ,  no vomiting. no fevers. Has not taken anything for pain or symptoms. patient is a poor historian , but able to recognise me and answer simple questions ,she has  history of dementia, no mental status changes per aide .CT abdomen was done and demonstrated proctocolitis ,admitted for GI workup Admitted for septic workup and evaluation,send blood and urine cx,serial lactate levels,monitor vitals windy nichols hydration,monitor urine output and renal profile,iv abx initiated -on cipro and falgyl Palliative care consult requested ,to discuss advance directives and complete /update [facility]

## 2018-10-10 NOTE — ED ADULT NURSE NOTE - NSIMPLEMENTINTERV_GEN_ALL_ED
Implemented All Fall Risk Interventions:  [place] to call system. Call bell, personal items and telephone within reach. Instruct patient to call for assistance. Room bathroom lighting operational. Non-slip footwear when patient is off stretcher. Physically safe environment: no spills, clutter or unnecessary equipment. Stretcher in lowest position, wheels locked, appropriate side rails in place. Provide visual cue, wrist band, yellow gown, etc. Monitor gait and stability. Monitor for mental status changes and reorient to person, place, and time. Review medications for side effects contributing to fall risk. Reinforce activity limits and safety measures with patient and family.

## 2018-10-10 NOTE — ED ADULT NURSE REASSESSMENT NOTE - NS ED NURSE REASSESS COMMENT FT1
Endorsed to HUDSON Mcintosh
Pt only able to tolerate 200 ml of PO contrast
Pt seen and examined by SHMUEL Gerber
Pt. tolerating liquid diet well.  Feeding self.  Aides at bedside.  Side rails up.

## 2018-10-10 NOTE — ED ADULT NURSE NOTE - PSH
Carpal tunnel syndrome  repair -right 2008  Cataract  b/l 2006  Dementia    History of lumpectomy of right breast  2016

## 2018-10-11 LAB
ALBUMIN SERPL ELPH-MCNC: 2.7 G/DL — LOW (ref 3.3–5)
ALP SERPL-CCNC: 65 U/L — SIGNIFICANT CHANGE UP (ref 30–120)
ALT FLD-CCNC: 13 U/L DA — SIGNIFICANT CHANGE UP (ref 10–60)
ANION GAP SERPL CALC-SCNC: 9 MMOL/L — SIGNIFICANT CHANGE UP (ref 5–17)
APPEARANCE UR: CLEAR — SIGNIFICANT CHANGE UP
AST SERPL-CCNC: 11 U/L — SIGNIFICANT CHANGE UP (ref 10–40)
BASOPHILS # BLD AUTO: 0 K/UL — SIGNIFICANT CHANGE UP (ref 0–0.2)
BASOPHILS NFR BLD AUTO: 0 % — SIGNIFICANT CHANGE UP (ref 0–2)
BILIRUB SERPL-MCNC: 0.2 MG/DL — SIGNIFICANT CHANGE UP (ref 0.2–1.2)
BILIRUB UR-MCNC: NEGATIVE — SIGNIFICANT CHANGE UP
BUN SERPL-MCNC: 16 MG/DL — SIGNIFICANT CHANGE UP (ref 7–23)
CALCIUM SERPL-MCNC: 7.9 MG/DL — LOW (ref 8.4–10.5)
CHLORIDE SERPL-SCNC: 104 MMOL/L — SIGNIFICANT CHANGE UP (ref 96–108)
CO2 SERPL-SCNC: 25 MMOL/L — SIGNIFICANT CHANGE UP (ref 22–31)
COLOR SPEC: YELLOW — SIGNIFICANT CHANGE UP
CREAT SERPL-MCNC: 0.87 MG/DL — SIGNIFICANT CHANGE UP (ref 0.5–1.3)
DIFF PNL FLD: NEGATIVE — SIGNIFICANT CHANGE UP
EOSINOPHIL # BLD AUTO: 0 K/UL — SIGNIFICANT CHANGE UP (ref 0–0.5)
EOSINOPHIL NFR BLD AUTO: 0 % — SIGNIFICANT CHANGE UP (ref 0–6)
GLUCOSE SERPL-MCNC: 110 MG/DL — HIGH (ref 70–99)
GLUCOSE UR QL: NEGATIVE MG/DL — SIGNIFICANT CHANGE UP
HCT VFR BLD CALC: 36 % — SIGNIFICANT CHANGE UP (ref 34.5–45)
HGB BLD-MCNC: 11.7 G/DL — SIGNIFICANT CHANGE UP (ref 11.5–15.5)
KETONES UR-MCNC: ABNORMAL
LEUKOCYTE ESTERASE UR-ACNC: ABNORMAL
LYMPHOCYTES # BLD AUTO: 0.89 K/UL — LOW (ref 1–3.3)
LYMPHOCYTES # BLD AUTO: 20 % — SIGNIFICANT CHANGE UP (ref 13–44)
MCHC RBC-ENTMCNC: 31.1 PG — SIGNIFICANT CHANGE UP (ref 27–34)
MCHC RBC-ENTMCNC: 32.5 GM/DL — SIGNIFICANT CHANGE UP (ref 32–36)
MCV RBC AUTO: 95.7 FL — SIGNIFICANT CHANGE UP (ref 80–100)
MONOCYTES # BLD AUTO: 0.49 K/UL — SIGNIFICANT CHANGE UP (ref 0–0.9)
MONOCYTES NFR BLD AUTO: 11 % — SIGNIFICANT CHANGE UP (ref 2–14)
NEUTROPHILS # BLD AUTO: 3.06 K/UL — SIGNIFICANT CHANGE UP (ref 1.8–7.4)
NEUTROPHILS NFR BLD AUTO: 63 % — SIGNIFICANT CHANGE UP (ref 43–77)
NITRITE UR-MCNC: NEGATIVE — SIGNIFICANT CHANGE UP
PH UR: 6 — SIGNIFICANT CHANGE UP (ref 5–8)
PLATELET # BLD AUTO: 317 K/UL — SIGNIFICANT CHANGE UP (ref 150–400)
POTASSIUM SERPL-MCNC: 3.3 MMOL/L — LOW (ref 3.5–5.3)
POTASSIUM SERPL-SCNC: 3.3 MMOL/L — LOW (ref 3.5–5.3)
PROT SERPL-MCNC: 5.5 G/DL — LOW (ref 6–8.3)
PROT UR-MCNC: 15 MG/DL
RBC # BLD: 3.76 M/UL — LOW (ref 3.8–5.2)
RBC # FLD: 12.7 % — SIGNIFICANT CHANGE UP (ref 10.3–14.5)
SODIUM SERPL-SCNC: 138 MMOL/L — SIGNIFICANT CHANGE UP (ref 135–145)
SP GR SPEC: 1.01 — SIGNIFICANT CHANGE UP (ref 1.01–1.02)
UROBILINOGEN FLD QL: NEGATIVE MG/DL — SIGNIFICANT CHANGE UP
WBC # BLD: 4.44 K/UL — SIGNIFICANT CHANGE UP (ref 3.8–10.5)
WBC # FLD AUTO: 4.44 K/UL — SIGNIFICANT CHANGE UP (ref 3.8–10.5)

## 2018-10-11 RX ORDER — LANOLIN ALCOHOL/MO/W.PET/CERES
3 CREAM (GRAM) TOPICAL AT BEDTIME
Qty: 0 | Refills: 0 | Status: DISCONTINUED | OUTPATIENT
Start: 2018-10-11 | End: 2018-10-13

## 2018-10-11 RX ADMIN — MIRTAZAPINE 15 MILLIGRAM(S): 45 TABLET, ORALLY DISINTEGRATING ORAL at 21:46

## 2018-10-11 RX ADMIN — CHOLESTYRAMINE 4 GRAM(S): 4 POWDER, FOR SUSPENSION ORAL at 21:46

## 2018-10-11 RX ADMIN — SODIUM CHLORIDE 50 MILLILITER(S): 9 INJECTION INTRAMUSCULAR; INTRAVENOUS; SUBCUTANEOUS at 21:46

## 2018-10-11 RX ADMIN — Medication 1 TABLET(S): at 05:09

## 2018-10-11 RX ADMIN — Medication 200 MILLIGRAM(S): at 19:39

## 2018-10-11 RX ADMIN — SODIUM CHLORIDE 50 MILLILITER(S): 9 INJECTION INTRAMUSCULAR; INTRAVENOUS; SUBCUTANEOUS at 14:29

## 2018-10-11 RX ADMIN — DONEPEZIL HYDROCHLORIDE 10 MILLIGRAM(S): 10 TABLET, FILM COATED ORAL at 21:46

## 2018-10-11 RX ADMIN — Medication 125 MICROGRAM(S): at 05:09

## 2018-10-11 RX ADMIN — Medication 1 TABLET(S): at 12:55

## 2018-10-11 RX ADMIN — Medication 100 MILLIGRAM(S): at 05:09

## 2018-10-11 RX ADMIN — Medication 3 MILLIGRAM(S): at 21:46

## 2018-10-11 RX ADMIN — QUETIAPINE FUMARATE 25 MILLIGRAM(S): 200 TABLET, FILM COATED ORAL at 05:10

## 2018-10-11 RX ADMIN — MEMANTINE HYDROCHLORIDE 10 MILLIGRAM(S): 10 TABLET ORAL at 17:54

## 2018-10-11 RX ADMIN — QUETIAPINE FUMARATE 25 MILLIGRAM(S): 200 TABLET, FILM COATED ORAL at 17:54

## 2018-10-11 RX ADMIN — Medication 100 MILLIGRAM(S): at 14:29

## 2018-10-11 RX ADMIN — CHOLESTYRAMINE 4 GRAM(S): 4 POWDER, FOR SUSPENSION ORAL at 08:38

## 2018-10-11 RX ADMIN — ENOXAPARIN SODIUM 40 MILLIGRAM(S): 100 INJECTION SUBCUTANEOUS at 11:38

## 2018-10-11 RX ADMIN — BUPROPION HYDROCHLORIDE 300 MILLIGRAM(S): 150 TABLET, EXTENDED RELEASE ORAL at 11:38

## 2018-10-11 RX ADMIN — MEMANTINE HYDROCHLORIDE 10 MILLIGRAM(S): 10 TABLET ORAL at 05:10

## 2018-10-11 RX ADMIN — Medication 100 MILLIGRAM(S): at 22:06

## 2018-10-11 RX ADMIN — BUDESONIDE AND FORMOTEROL FUMARATE DIHYDRATE 2 PUFF(S): 160; 4.5 AEROSOL RESPIRATORY (INHALATION) at 08:38

## 2018-10-11 RX ADMIN — FAMOTIDINE 20 MILLIGRAM(S): 10 INJECTION INTRAVENOUS at 17:54

## 2018-10-11 RX ADMIN — Medication 1 TABLET(S): at 21:46

## 2018-10-11 RX ADMIN — LETROZOLE 2.5 MILLIGRAM(S): 2.5 TABLET, FILM COATED ORAL at 11:38

## 2018-10-11 RX ADMIN — Medication 200 MILLIGRAM(S): at 06:20

## 2018-10-11 RX ADMIN — Medication 81 MILLIGRAM(S): at 11:38

## 2018-10-11 RX ADMIN — LOSARTAN POTASSIUM 100 MILLIGRAM(S): 100 TABLET, FILM COATED ORAL at 05:09

## 2018-10-12 LAB
ALBUMIN SERPL ELPH-MCNC: 2.8 G/DL — LOW (ref 3.3–5)
ALP SERPL-CCNC: 63 U/L — SIGNIFICANT CHANGE UP (ref 30–120)
ALT FLD-CCNC: 14 U/L DA — SIGNIFICANT CHANGE UP (ref 10–60)
ANION GAP SERPL CALC-SCNC: 9 MMOL/L — SIGNIFICANT CHANGE UP (ref 5–17)
AST SERPL-CCNC: 14 U/L — SIGNIFICANT CHANGE UP (ref 10–40)
BASOPHILS # BLD AUTO: 0.02 K/UL — SIGNIFICANT CHANGE UP (ref 0–0.2)
BASOPHILS NFR BLD AUTO: 0.4 % — SIGNIFICANT CHANGE UP (ref 0–2)
BILIRUB SERPL-MCNC: 0.3 MG/DL — SIGNIFICANT CHANGE UP (ref 0.2–1.2)
BUN SERPL-MCNC: 9 MG/DL — SIGNIFICANT CHANGE UP (ref 7–23)
C DIFF BY PCR RESULT: SIGNIFICANT CHANGE UP
C DIFF TOX GENS STL QL NAA+PROBE: SIGNIFICANT CHANGE UP
CALCIUM SERPL-MCNC: 8.6 MG/DL — SIGNIFICANT CHANGE UP (ref 8.4–10.5)
CHLORIDE SERPL-SCNC: 107 MMOL/L — SIGNIFICANT CHANGE UP (ref 96–108)
CO2 SERPL-SCNC: 25 MMOL/L — SIGNIFICANT CHANGE UP (ref 22–31)
CREAT SERPL-MCNC: 0.87 MG/DL — SIGNIFICANT CHANGE UP (ref 0.5–1.3)
EOSINOPHIL # BLD AUTO: 0.16 K/UL — SIGNIFICANT CHANGE UP (ref 0–0.5)
EOSINOPHIL NFR BLD AUTO: 3.5 % — SIGNIFICANT CHANGE UP (ref 0–6)
GLUCOSE SERPL-MCNC: 103 MG/DL — HIGH (ref 70–99)
HCT VFR BLD CALC: 34.9 % — SIGNIFICANT CHANGE UP (ref 34.5–45)
HGB BLD-MCNC: 11.9 G/DL — SIGNIFICANT CHANGE UP (ref 11.5–15.5)
IMM GRANULOCYTES NFR BLD AUTO: 1.1 % — SIGNIFICANT CHANGE UP (ref 0–1.5)
LYMPHOCYTES # BLD AUTO: 1.17 K/UL — SIGNIFICANT CHANGE UP (ref 1–3.3)
LYMPHOCYTES # BLD AUTO: 25.3 % — SIGNIFICANT CHANGE UP (ref 13–44)
MCHC RBC-ENTMCNC: 32.1 PG — SIGNIFICANT CHANGE UP (ref 27–34)
MCHC RBC-ENTMCNC: 34.1 GM/DL — SIGNIFICANT CHANGE UP (ref 32–36)
MCV RBC AUTO: 94.1 FL — SIGNIFICANT CHANGE UP (ref 80–100)
MONOCYTES # BLD AUTO: 0.55 K/UL — SIGNIFICANT CHANGE UP (ref 0–0.9)
MONOCYTES NFR BLD AUTO: 11.9 % — SIGNIFICANT CHANGE UP (ref 2–14)
NEUTROPHILS # BLD AUTO: 2.68 K/UL — SIGNIFICANT CHANGE UP (ref 1.8–7.4)
NEUTROPHILS NFR BLD AUTO: 57.8 % — SIGNIFICANT CHANGE UP (ref 43–77)
PLATELET # BLD AUTO: 295 K/UL — SIGNIFICANT CHANGE UP (ref 150–400)
POTASSIUM SERPL-MCNC: 3.2 MMOL/L — LOW (ref 3.5–5.3)
POTASSIUM SERPL-SCNC: 3.2 MMOL/L — LOW (ref 3.5–5.3)
PROT SERPL-MCNC: 5.5 G/DL — LOW (ref 6–8.3)
RBC # BLD: 3.71 M/UL — LOW (ref 3.8–5.2)
RBC # FLD: 12.7 % — SIGNIFICANT CHANGE UP (ref 10.3–14.5)
SODIUM SERPL-SCNC: 141 MMOL/L — SIGNIFICANT CHANGE UP (ref 135–145)
WBC # BLD: 4.63 K/UL — SIGNIFICANT CHANGE UP (ref 3.8–10.5)
WBC # FLD AUTO: 4.63 K/UL — SIGNIFICANT CHANGE UP (ref 3.8–10.5)

## 2018-10-12 RX ORDER — POTASSIUM CHLORIDE 20 MEQ
40 PACKET (EA) ORAL EVERY 4 HOURS
Qty: 0 | Refills: 0 | Status: COMPLETED | OUTPATIENT
Start: 2018-10-12 | End: 2018-10-12

## 2018-10-12 RX ADMIN — Medication 125 MICROGRAM(S): at 05:17

## 2018-10-12 RX ADMIN — FAMOTIDINE 20 MILLIGRAM(S): 10 INJECTION INTRAVENOUS at 05:16

## 2018-10-12 RX ADMIN — MIRTAZAPINE 15 MILLIGRAM(S): 45 TABLET, ORALLY DISINTEGRATING ORAL at 21:35

## 2018-10-12 RX ADMIN — CHOLESTYRAMINE 4 GRAM(S): 4 POWDER, FOR SUSPENSION ORAL at 21:34

## 2018-10-12 RX ADMIN — Medication 100 MILLIGRAM(S): at 06:09

## 2018-10-12 RX ADMIN — Medication 81 MILLIGRAM(S): at 12:39

## 2018-10-12 RX ADMIN — BUDESONIDE AND FORMOTEROL FUMARATE DIHYDRATE 2 PUFF(S): 160; 4.5 AEROSOL RESPIRATORY (INHALATION) at 18:57

## 2018-10-12 RX ADMIN — Medication 200 MILLIGRAM(S): at 05:08

## 2018-10-12 RX ADMIN — LETROZOLE 2.5 MILLIGRAM(S): 2.5 TABLET, FILM COATED ORAL at 12:39

## 2018-10-12 RX ADMIN — Medication 100 MILLIGRAM(S): at 21:37

## 2018-10-12 RX ADMIN — Medication 100 MILLIGRAM(S): at 14:21

## 2018-10-12 RX ADMIN — BUPROPION HYDROCHLORIDE 300 MILLIGRAM(S): 150 TABLET, EXTENDED RELEASE ORAL at 12:39

## 2018-10-12 RX ADMIN — CHOLESTYRAMINE 4 GRAM(S): 4 POWDER, FOR SUSPENSION ORAL at 09:06

## 2018-10-12 RX ADMIN — Medication 3 MILLIGRAM(S): at 21:35

## 2018-10-12 RX ADMIN — Medication 1 TABLET(S): at 14:21

## 2018-10-12 RX ADMIN — Medication 1 TABLET(S): at 21:35

## 2018-10-12 RX ADMIN — QUETIAPINE FUMARATE 25 MILLIGRAM(S): 200 TABLET, FILM COATED ORAL at 05:17

## 2018-10-12 RX ADMIN — BUDESONIDE AND FORMOTEROL FUMARATE DIHYDRATE 2 PUFF(S): 160; 4.5 AEROSOL RESPIRATORY (INHALATION) at 06:51

## 2018-10-12 RX ADMIN — Medication 40 MILLIEQUIVALENT(S): at 17:54

## 2018-10-12 RX ADMIN — Medication 200 MILLIGRAM(S): at 17:54

## 2018-10-12 RX ADMIN — LOSARTAN POTASSIUM 100 MILLIGRAM(S): 100 TABLET, FILM COATED ORAL at 05:17

## 2018-10-12 RX ADMIN — Medication 1 TABLET(S): at 05:17

## 2018-10-12 RX ADMIN — FAMOTIDINE 20 MILLIGRAM(S): 10 INJECTION INTRAVENOUS at 17:54

## 2018-10-12 RX ADMIN — QUETIAPINE FUMARATE 25 MILLIGRAM(S): 200 TABLET, FILM COATED ORAL at 17:54

## 2018-10-12 RX ADMIN — Medication 40 MILLIEQUIVALENT(S): at 14:21

## 2018-10-12 RX ADMIN — MEMANTINE HYDROCHLORIDE 10 MILLIGRAM(S): 10 TABLET ORAL at 05:17

## 2018-10-12 RX ADMIN — DONEPEZIL HYDROCHLORIDE 10 MILLIGRAM(S): 10 TABLET, FILM COATED ORAL at 21:36

## 2018-10-12 RX ADMIN — ENOXAPARIN SODIUM 40 MILLIGRAM(S): 100 INJECTION SUBCUTANEOUS at 12:39

## 2018-10-12 RX ADMIN — MEMANTINE HYDROCHLORIDE 10 MILLIGRAM(S): 10 TABLET ORAL at 17:54

## 2018-10-13 ENCOUNTER — TRANSCRIPTION ENCOUNTER (OUTPATIENT)
Age: 83
End: 2018-10-13

## 2018-10-13 VITALS
DIASTOLIC BLOOD PRESSURE: 82 MMHG | SYSTOLIC BLOOD PRESSURE: 137 MMHG | TEMPERATURE: 98 F | OXYGEN SATURATION: 97 % | HEART RATE: 79 BPM | RESPIRATION RATE: 18 BRPM

## 2018-10-13 LAB
ANION GAP SERPL CALC-SCNC: 5 MMOL/L — SIGNIFICANT CHANGE UP (ref 5–17)
BUN SERPL-MCNC: 7 MG/DL — SIGNIFICANT CHANGE UP (ref 7–23)
CALCIUM SERPL-MCNC: 8.4 MG/DL — SIGNIFICANT CHANGE UP (ref 8.4–10.5)
CHLORIDE SERPL-SCNC: 110 MMOL/L — HIGH (ref 96–108)
CO2 SERPL-SCNC: 25 MMOL/L — SIGNIFICANT CHANGE UP (ref 22–31)
CREAT SERPL-MCNC: 0.89 MG/DL — SIGNIFICANT CHANGE UP (ref 0.5–1.3)
GLUCOSE BLDC GLUCOMTR-MCNC: 99 MG/DL — SIGNIFICANT CHANGE UP (ref 70–99)
GLUCOSE SERPL-MCNC: 103 MG/DL — HIGH (ref 70–99)
HCT VFR BLD CALC: 32 % — LOW (ref 34.5–45)
HGB BLD-MCNC: 10.6 G/DL — LOW (ref 11.5–15.5)
MAGNESIUM SERPL-MCNC: 1.7 MG/DL — SIGNIFICANT CHANGE UP (ref 1.6–2.6)
MCHC RBC-ENTMCNC: 31.5 PG — SIGNIFICANT CHANGE UP (ref 27–34)
MCHC RBC-ENTMCNC: 33.1 GM/DL — SIGNIFICANT CHANGE UP (ref 32–36)
MCV RBC AUTO: 95.2 FL — SIGNIFICANT CHANGE UP (ref 80–100)
NRBC # BLD: 0 /100 WBCS — SIGNIFICANT CHANGE UP (ref 0–0)
PHOSPHATE SERPL-MCNC: 2.1 MG/DL — LOW (ref 2.5–4.5)
PLATELET # BLD AUTO: 276 K/UL — SIGNIFICANT CHANGE UP (ref 150–400)
POTASSIUM SERPL-MCNC: 3.9 MMOL/L — SIGNIFICANT CHANGE UP (ref 3.5–5.3)
POTASSIUM SERPL-SCNC: 3.9 MMOL/L — SIGNIFICANT CHANGE UP (ref 3.5–5.3)
RBC # BLD: 3.36 M/UL — LOW (ref 3.8–5.2)
RBC # FLD: 12.9 % — SIGNIFICANT CHANGE UP (ref 10.3–14.5)
SODIUM SERPL-SCNC: 140 MMOL/L — SIGNIFICANT CHANGE UP (ref 135–145)
WBC # BLD: 5.1 K/UL — SIGNIFICANT CHANGE UP (ref 3.8–10.5)
WBC # FLD AUTO: 5.1 K/UL — SIGNIFICANT CHANGE UP (ref 3.8–10.5)

## 2018-10-13 RX ORDER — CIPROFLOXACIN LACTATE 400MG/40ML
1 VIAL (ML) INTRAVENOUS
Qty: 0 | Refills: 0 | COMMUNITY
Start: 2018-10-13

## 2018-10-13 RX ORDER — LACTOBACILLUS ACIDOPHILUS 100MM CELL
2 CAPSULE ORAL
Qty: 60 | Refills: 0 | OUTPATIENT
Start: 2018-10-13 | End: 2018-11-11

## 2018-10-13 RX ORDER — CIPROFLOXACIN LACTATE 400MG/40ML
1 VIAL (ML) INTRAVENOUS
Qty: 20 | Refills: 0 | OUTPATIENT
Start: 2018-10-13 | End: 2018-10-22

## 2018-10-13 RX ORDER — LOPERAMIDE HCL 2 MG
1 TABLET ORAL
Qty: 0 | Refills: 0 | COMMUNITY

## 2018-10-13 RX ORDER — METRONIDAZOLE 500 MG
1 TABLET ORAL
Qty: 30 | Refills: 0 | OUTPATIENT
Start: 2018-10-13 | End: 2018-10-22

## 2018-10-13 RX ORDER — PSYLLIUM SEED (WITH DEXTROSE)
0 POWDER (GRAM) ORAL
Qty: 0 | Refills: 0 | COMMUNITY

## 2018-10-13 RX ORDER — MAGNESIUM OXIDE 400 MG ORAL TABLET 241.3 MG
400 TABLET ORAL ONCE
Qty: 0 | Refills: 0 | Status: COMPLETED | OUTPATIENT
Start: 2018-10-13 | End: 2018-10-13

## 2018-10-13 RX ORDER — METRONIDAZOLE 500 MG
1 TABLET ORAL
Qty: 0 | Refills: 0 | COMMUNITY
Start: 2018-10-13

## 2018-10-13 RX ORDER — LACTOBACILLUS ACIDOPHILUS 100MM CELL
2 CAPSULE ORAL
Qty: 0 | Refills: 0 | COMMUNITY
Start: 2018-10-13

## 2018-10-13 RX ADMIN — Medication 81 MILLIGRAM(S): at 11:02

## 2018-10-13 RX ADMIN — LOSARTAN POTASSIUM 100 MILLIGRAM(S): 100 TABLET, FILM COATED ORAL at 06:53

## 2018-10-13 RX ADMIN — LETROZOLE 2.5 MILLIGRAM(S): 2.5 TABLET, FILM COATED ORAL at 11:02

## 2018-10-13 RX ADMIN — QUETIAPINE FUMARATE 25 MILLIGRAM(S): 200 TABLET, FILM COATED ORAL at 06:53

## 2018-10-13 RX ADMIN — BUPROPION HYDROCHLORIDE 300 MILLIGRAM(S): 150 TABLET, EXTENDED RELEASE ORAL at 11:02

## 2018-10-13 RX ADMIN — BUDESONIDE AND FORMOTEROL FUMARATE DIHYDRATE 2 PUFF(S): 160; 4.5 AEROSOL RESPIRATORY (INHALATION) at 06:55

## 2018-10-13 RX ADMIN — Medication 1 TABLET(S): at 06:53

## 2018-10-13 RX ADMIN — Medication 125 MICROGRAM(S): at 06:53

## 2018-10-13 RX ADMIN — Medication 100 MILLIGRAM(S): at 15:05

## 2018-10-13 RX ADMIN — MEMANTINE HYDROCHLORIDE 10 MILLIGRAM(S): 10 TABLET ORAL at 06:53

## 2018-10-13 RX ADMIN — Medication 1 TABLET(S): at 15:05

## 2018-10-13 RX ADMIN — FAMOTIDINE 20 MILLIGRAM(S): 10 INJECTION INTRAVENOUS at 06:53

## 2018-10-13 RX ADMIN — Medication 200 MILLIGRAM(S): at 06:54

## 2018-10-13 RX ADMIN — ENOXAPARIN SODIUM 40 MILLIGRAM(S): 100 INJECTION SUBCUTANEOUS at 11:05

## 2018-10-13 RX ADMIN — Medication 62.5 MILLIMOLE(S): at 10:52

## 2018-10-13 RX ADMIN — CHOLESTYRAMINE 4 GRAM(S): 4 POWDER, FOR SUSPENSION ORAL at 10:51

## 2018-10-13 RX ADMIN — MAGNESIUM OXIDE 400 MG ORAL TABLET 400 MILLIGRAM(S): 241.3 TABLET ORAL at 11:02

## 2018-10-13 RX ADMIN — Medication 100 MILLIGRAM(S): at 06:54

## 2018-10-13 NOTE — PROGRESS NOTE ADULT - PROBLEM SELECTOR PLAN 1
in and out  bacid one tab tid  low residue lactose free diet  stool studies  to follow  if stool negative consider imodium
stool studies , GI consult is appreciated  ,IV HYDRATION ,monitor electrolytes closely ,will start immodium if negative for c.diff
stool studies , GI consult is appreciated  ,IV HYDRATION ,monitor electrolytes closely ,will start immodium if negative for c.diff

## 2018-10-13 NOTE — DISCHARGE NOTE ADULT - CARE PLAN
Principal Discharge DX:	Proctocolitis  Goal:	resolution of diarrhea  Assessment and plan of treatment:	encourage po fluids , complete antibacterial course , immodium as needed 1 tab by mouth every 4 hrs as needed diarrhea  Secondary Diagnosis:	Hypertension  Assessment and plan of treatment:	BP monitoring,continue current antihypertensive meds, low salt diet,followup with PMD  Secondary Diagnosis:	Hypothyroidism  Assessment and plan of treatment:	continue home medications  Secondary Diagnosis:	History of lumpectomy of right breast  Assessment and plan of treatment:	continue home medications -on femara  Secondary Diagnosis:	Depression  Assessment and plan of treatment:	continue current managmnet and medications  Secondary Diagnosis:	Dementia  Assessment and plan of treatment:	continue home medications  Secondary Diagnosis:	Prophylactic measure  Assessment and plan of treatment:	continue current managmnet and medications

## 2018-10-13 NOTE — DISCHARGE NOTE ADULT - SECONDARY DIAGNOSIS.
Hypertension Hypothyroidism History of lumpectomy of right breast Depression Dementia Prophylactic measure

## 2018-10-13 NOTE — PROGRESS NOTE ADULT - PROBLEM SELECTOR PROBLEM 1
Diarrhea, unspecified type

## 2018-10-13 NOTE — PROGRESS NOTE ADULT - SUBJECTIVE AND OBJECTIVE BOX
INTERVAL HPI/OVERNIGHT EVENTS:  No new overnight event.  No N/V/D.  Tolerating diet.    Allergies    Ceftin (Pruritus; Rash)    Intolerances          General:  No wt loss, fevers, chills, night sweats, fatigue,   Eyes:  Good vision, no reported pain  ENT:  No sore throat, pain, runny nose, dysphagia  CV:  No pain, palpitations, hypo/hypertension  Resp:  No dyspnea, cough, tachypnea, wheezing  GI:  No pain, No nausea, No vomiting, No diarrhea, No constipation, No weight loss, No fever, No pruritis, No rectal bleeding, No tarry stools, No dysphagia,  :  No pain, bleeding, incontinence, nocturia  Muscle:  No pain, weakness  Neuro:  No weakness, tingling, memory problems  Psych:  No fatigue, insomnia, mood problems, depression  Endocrine:  No polyuria, polydipsia, cold/heat intolerance  Heme:  No petechiae, ecchymosis, easy bruisability  Skin:  No rash, tattoos, scars, edema      PHYSICAL EXAM:   Vital Signs:  Vital Signs Last 24 Hrs  T(C): 36.6 (12 Oct 2018 17:23), Max: 37.2 (11 Oct 2018 22:19)  T(F): 97.9 (12 Oct 2018 17:23), Max: 99 (11 Oct 2018 22:19)  HR: 78 (12 Oct 2018 17:23) (66 - 84)  BP: 124/75 (12 Oct 2018 17:23) (116/72 - 166/94)  BP(mean): --  RR: 18 (12 Oct 2018 17:23) (18 - 18)  SpO2: 98% (12 Oct 2018 17:23) (94% - 98%)  Daily     Daily I&O's Summary    11 Oct 2018 07:01  -  12 Oct 2018 07:00  --------------------------------------------------------  IN: 1600 mL / OUT: 600 mL / NET: 1000 mL        GENERAL:  Appears stated age, well-groomed, well-nourished, no distress  HEENT:  NC/AT,  conjunctivae clear and pink, no thyromegaly, nodules, adenopathy, no JVD, sclera -anicteric  CHEST:  Full & symmetric excursion, no increased effort, breath sounds clear  HEART:  Regular rhythm, S1, S2, no murmur/rub/S3/S4, no abdominal bruit, no edema  ABDOMEN:  Soft, non-tender, non-distended, normoactive bowel sounds,  no masses ,no hepato-splenomegaly, no signs of chronic liver disease  EXTEREMITIES:  no cyanosis,clubbing or edema  SKIN:  No rash/erythema/ecchymoses/petechiae/wounds/abscess/warm/dry  NEURO:  Alert, oriented, no asterixis, no tremor, no encephalopathy      LABS:                        11.9   4.63  )-----------( 295      ( 12 Oct 2018 08:16 )             34.9     10-12    141  |  107  |  9   ----------------------------<  103<H>  3.2<L>   |  25  |  0.87    Ca    8.6      12 Oct 2018 08:16    TPro  5.5<L>  /  Alb  2.8<L>  /  TBili  0.3  /  DBili  x   /  AST  14  /  ALT  14  /  AlkPhos  63  10-12      Urinalysis Basic - ( 11 Oct 2018 02:51 )    Color: Yellow / Appearance: Clear / S.010 / pH: x  Gluc: x / Ketone: Trace  / Bili: Negative / Urobili: Negative mg/dL   Blood: x / Protein: 15 mg/dL / Nitrite: Negative   Leuk Esterase: Trace / RBC: 6-10 /HPF / WBC 0-2   Sq Epi: x / Non Sq Epi: Few / Bacteria: Negative      amylase   lipaseLipase, Serum: 97 U/L (10-10 @ 12:42)    RADIOLOGY & ADDITIONAL TESTS:
INTERVAL HPI/OVERNIGHT EVENTS:  No new overnight event.  No N/V/D.  Tolerating diet.    Allergies    Ceftin (Pruritus; Rash)    Intolerances          General:  No wt loss, fevers, chills, night sweats, fatigue,   Eyes:  Good vision, no reported pain  ENT:  No sore throat, pain, runny nose, dysphagia  CV:  No pain, palpitations, hypo/hypertension  Resp:  No dyspnea, cough, tachypnea, wheezing  GI:  No pain, No nausea, No vomiting, No diarrhea, No constipation, No weight loss, No fever, No pruritis, No rectal bleeding, No tarry stools, No dysphagia,  :  No pain, bleeding, incontinence, nocturia  Muscle:  No pain, weakness  Neuro:  No weakness, tingling, memory problems  Psych:  No fatigue, insomnia, mood problems, depression  Endocrine:  No polyuria, polydipsia, cold/heat intolerance  Heme:  No petechiae, ecchymosis, easy bruisability  Skin:  No rash, tattoos, scars, edema      PHYSICAL EXAM:   Vital Signs:  Vital Signs Last 24 Hrs  T(C): 36.6 (12 Oct 2018 17:23), Max: 37.2 (11 Oct 2018 22:19)  T(F): 97.9 (12 Oct 2018 17:23), Max: 99 (11 Oct 2018 22:19)  HR: 78 (12 Oct 2018 17:23) (66 - 84)  BP: 124/75 (12 Oct 2018 17:23) (116/72 - 166/94)  BP(mean): --  RR: 18 (12 Oct 2018 17:23) (18 - 18)  SpO2: 98% (12 Oct 2018 17:23) (94% - 98%)  Daily     Daily I&O's Summary    11 Oct 2018 07:01  -  12 Oct 2018 07:00  --------------------------------------------------------  IN: 1600 mL / OUT: 600 mL / NET: 1000 mL        GENERAL:  Appears stated age, well-groomed, well-nourished, no distress  HEENT:  NC/AT,  conjunctivae clear and pink, no thyromegaly, nodules, adenopathy, no JVD, sclera -anicteric  CHEST:  Full & symmetric excursion, no increased effort, breath sounds clear  HEART:  Regular rhythm, S1, S2, no murmur/rub/S3/S4, no abdominal bruit, no edema  ABDOMEN:  Soft, non-tender, non-distended, normoactive bowel sounds,  no masses ,no hepato-splenomegaly, no signs of chronic liver disease  EXTEREMITIES:  no cyanosis,clubbing or edema  SKIN:  No rash/erythema/ecchymoses/petechiae/wounds/abscess/warm/dry  NEURO:  Alert, oriented, no asterixis, no tremor, no encephalopathy      LABS:                        11.9   4.63  )-----------( 295      ( 12 Oct 2018 08:16 )             34.9     10-12    141  |  107  |  9   ----------------------------<  103<H>  3.2<L>   |  25  |  0.87    Ca    8.6      12 Oct 2018 08:16    TPro  5.5<L>  /  Alb  2.8<L>  /  TBili  0.3  /  DBili  x   /  AST  14  /  ALT  14  /  AlkPhos  63  10-12      Urinalysis Basic - ( 11 Oct 2018 02:51 )    Color: Yellow / Appearance: Clear / S.010 / pH: x  Gluc: x / Ketone: Trace  / Bili: Negative / Urobili: Negative mg/dL   Blood: x / Protein: 15 mg/dL / Nitrite: Negative   Leuk Esterase: Trace / RBC: 6-10 /HPF / WBC 0-2   Sq Epi: x / Non Sq Epi: Few / Bacteria: Negative      amylase   lipaseLipase, Serum: 97 U/L (10-10 @ 12:42)    RADIOLOGY & ADDITIONAL TESTS:
INTERVAL HPI/OVERNIGHT EVENTS:  No new overnight event.  No N/V/D.  Tolerating diet.  diarrhea stopped    Allergies    Ceftin (Pruritus; Rash)    Intolerances  General:  No wt loss, fevers, chills, night sweats, fatigue,   Eyes:  Good vision, no reported pain  ENT:  No sore throat, pain, runny nose, dysphagia  CV:  No pain, palpitations, hypo/hypertension  Resp:  No dyspnea, cough, tachypnea, wheezing  GI:  No pain, No nausea, No vomiting, No diarrhea, No constipation, No weight loss, No fever, No pruritis, No rectal bleeding, No tarry stools, No dysphagia,  :  No pain, bleeding, incontinence, nocturia  Muscle:  No pain, weakness  Neuro:  No weakness, tingling, memory problems  Psych:  No fatigue, insomnia, mood problems, depression  Endocrine:  No polyuria, polydipsia, cold/heat intolerance  Heme:  No petechiae, ecchymosis, easy bruisability  Skin:  No rash, tattoos, scars, edema      PHYSICAL EXAM:   Vital Signs:  Vital Signs Last 24 Hrs  T(C): 37 (11 Oct 2018 05:31), Max: 37.3 (10 Oct 2018 11:28)  T(F): 98.6 (11 Oct 2018 05:31), Max: 99.2 (10 Oct 2018 11:28)  HR: 77 (11 Oct 2018 05:31) (67 - 89)  BP: 152/79 (11 Oct 2018 05:31) (119/67 - 152/79)  BP(mean): --  RR: 18 (11 Oct 2018 05:31) (16 - 18)  SpO2: 97% (11 Oct 2018 05:31) (96% - 98%)  Daily Height in cm: 162.56 (10 Oct 2018 11:28)    Daily I&O's Summary    10 Oct 2018 07:01  -  11 Oct 2018 07:00  --------------------------------------------------------  IN: 1875 mL / OUT: 0 mL / NET: 1875 mL        GENERAL:  Appears stated age, well-groomed, well-nourished, no distress  HEENT:  NC/AT,  conjunctivae clear and pink, no thyromegaly, nodules, adenopathy, no JVD, sclera -anicteric  CHEST:  Full & symmetric excursion, no increased effort, breath sounds clear  HEART:  Regular rhythm, S1, S2, no murmur/rub/S3/S4, no abdominal bruit, no edema  ABDOMEN:  Soft, non-tender, non-distended, normoactive bowel sounds,  no masses ,no hepato-splenomegaly, no signs of chronic liver disease  EXTEREMITIES:  no cyanosis,clubbing or edema  SKIN:  No rash/erythema/ecchymoses/petechiae/wounds/abscess/warm/dry  NEURO:  Alert, oriented, no asterixis, no tremor, no encephalopathy      LABS:                        11.7   4.44  )-----------( 317      ( 11 Oct 2018 08:10 )             36.0     10-10    136  |  102  |  30<H>  ----------------------------<  146<H>  3.5   |  27  |  1.12    Ca    8.8      10 Oct 2018 12:42    TPro  6.7  /  Alb  3.1<L>  /  TBili  0.3  /  DBili  x   /  AST  12  /  ALT  15  /  AlkPhos  73  10-10    PT/INR - ( 10 Oct 2018 12:42 )   PT: 11.8 sec;   INR: 1.08 ratio         PTT - ( 10 Oct 2018 12:42 )  PTT:29.8 sec  Urinalysis Basic - ( 11 Oct 2018 02:51 )    Color: Yellow / Appearance: Clear / S.010 / pH: x  Gluc: x / Ketone: Trace  / Bili: Negative / Urobili: Negative mg/dL   Blood: x / Protein: 15 mg/dL / Nitrite: Negative   Leuk Esterase: Trace / RBC: 6-10 /HPF / WBC 0-2   Sq Epi: x / Non Sq Epi: Few / Bacteria: Negative      amylase   lipaseLipase, Serum: 97 U/L (10-10 @ 12:42)    RADIOLOGY & ADDITIONAL TESTS:
PROGRESS NOTE  Patient is a 83y old  Female who presents with a chief complaint of diarrhea (11 Oct 2018 08:58)  Chart and available morning labs /imaging are reviewed electronically , urgent issues addressed . More information  is being added upon completion of rounds , when more information is collected and management discussed with consultants , medical staff and social service/case management on the floor   OVERNIGHT  No new issues reported by medical staff . All above noted Patient is resting in a bed comfortably .Confused ,poor mentation .No distress noted   had loose BM last night and this pmorning as per aid at bedside   HPI:  84 YO WF with hx of BREAST CA, S/P LUMPECTOMY IN 2016 ,HTN ,HYPOTHYROIDISM ,HYPERPARATHYROIDISM ,DEPRESSION ,DEMENTIA ,CAD/LBBB,S/P CTS SURGERY, CATARACT SX ,recent admission with vasovagal syncope due to dehydration presents with diarrhea for the past 5 days ,associated with nausea, no vomiting. She complained of abdominal pain to aide a few days ago,has a poor po intake last few days ,  no vomiting. no fevers. Has not taken anything for pain or symptoms. patient is a poor historian , but able to recognise me and answer simple questions ,she has  history of dementia, no mental status changes per aide .CT abdomen was done and demonstrated proctocolitis ,admitted for GI workup Admitted for septic workup and evaluation,send blood and urine cx,serial lactate levels,monitor vitals closley,ivfs hydration,monitor urine output and renal profile,iv abx initiated -on cipro and falgyl Palliative care consult requested ,to discuss advance directives and complete /update MOLST (10 Oct 2018 16:34)  PAST MEDICAL & SURGICAL HISTORY:  Hyperparathyroidism  LBBB (left bundle branch block)  Depression  Malignant neoplasm of breast: h/o  Hypothyroidism  Hypertension  Dementia  Dementia  Cataract: b/l   History of lumpectomy of right breast: 2016  Carpal tunnel syndrome: repair -right   MEDICATIONS  (STANDING):  aspirin enteric coated 81 milliGRAM(s) Oral daily  buDESOnide  80 MICROgram(s)/formoterol 4.5 MICROgram(s) Inhaler 2 Puff(s) Inhalation two times a day  buPROPion XL . 300 milliGRAM(s) Oral daily  cholestyramine Powder (Sugar-Free) 4 Gram(s) Oral two times a day  ciprofloxacin   IVPB 400 milliGRAM(s) IV Intermittent every 12 hours  donepezil 10 milliGRAM(s) Oral at bedtime  enoxaparin Injectable 40 milliGRAM(s) SubCutaneous daily  famotidine    Tablet 20 milliGRAM(s) Oral two times a day  lactobacillus acidophilus 1 Tablet(s) Oral every 8 hours  letrozole 2.5 milliGRAM(s) Oral daily  levothyroxine 125 MICROGram(s) Oral daily  losartan 100 milliGRAM(s) Oral daily  memantine 10 milliGRAM(s) Oral two times a day  metroNIDAZOLE  IVPB 500 milliGRAM(s) IV Intermittent every 8 hours  mirtazapine 15 milliGRAM(s) Oral at bedtime  QUEtiapine 25 milliGRAM(s) Oral two times a day  sodium chloride 0.9%. 1000 milliLiter(s) (75 mL/Hr) IV Continuous <Continuous>  MEDICATIONS  (PRN):  acetaminophen   Tablet .. 650 milliGRAM(s) Oral every 6 hours PRN Temp greater or equal to 38C (100.4F), Mild Pain (1 - 3)  ondansetron Injectable 4 milliGRAM(s) IV Push every 6 hours PRN Nausea and/or Vomiting  OBJECTIVE  T(C): 37 (10-11-18 @ 05:31), Max: 37.3 (10-10-18 @ 11:28)  HR: 77 (10-11-18 @ 05:31) (67 - 89)  BP: 152/79 (10-11-18 @ 05:31) (119/67 - 152/79)  RR: 18 (10-11-18 @ 05:31) (16 - 18)  SpO2: 97% (10-11-18 @ 05:31) (96% - 98%)  Wt(kg): --  I&O's Summary  10 Oct 2018 07:01  -  11 Oct 2018 07:00  --------------------------------------------------------  IN: 1875 mL / OUT: 0 mL / NET: 1875 mL  REVIEW OF SYSTEMS:  CONSTITUTIONAL: No fever, weight loss, or fatigue  EYES: No eye pain, visual disturbances, or discharge  ENMT:   No sinus or throat pain  NECK: No pain or stiffness  RESPIRATORY: No cough, wheezing, chills or hemoptysis; No shortness of breath  CARDIOVASCULAR: No chest pain, palpitations, dizziness, or leg swelling  GASTROINTESTINAL: No abdominal pain. No nausea, vomiting; still having  diarrea  GENITOURINARY: No dysuria, frequency, hematuria, or incontinence  NEUROLOGICAL: , memory loss   SKIN: No itching, burning, rashes, or lesions   MUSCULOSKELETAL: No joint pain or swelling; No muscle, back, or extremity pain  PHYSICAL EXAM:  Appearance: NAD. VS past 24 hrs -as above   HEENT:   Moist oral mucosa. Conjunctiva clear b/l.   Neck : supple  Respiratory: Lungs CTAB.  Gastrointestinal:  Soft, nontender. No rebound. No rigidity. BS present	  Cardiovascular: RRR ,S1S2 present  Neurologic: Non-focal. Moving all extremities.  Extremities: No edema. No erythema. No calf tenderness.  Skin: No rashes, No ecchymoses, No cyanosis.	  wounds ,skin lesions-See skin assesment flow sheet   LABS:                        11.7   4.44  )-----------( 317      ( 11 Oct 2018 08:10 )             36.0   10-10  136  |  102  |  30<H>  ----------------------------<  146<H>  3.5   |  27  |  1.12  Ca    8.8      10 Oct 2018 12:42  TPro  6.7  /  Alb  3.1<L>  /  TBili  0.3  /  DBili  x   /  AST  12  /  ALT  15  /  AlkPhos  73  10-10    CAPILLARY BLOOD GLUCOSE        PT/INR - ( 10 Oct 2018 12:42 )   PT: 11.8 sec;   INR: 1.08 ratio         PTT - ( 10 Oct 2018 12:42 )  PTT:29.8 sec  Urinalysis Basic - ( 11 Oct 2018 02:51 )    Color: Yellow / Appearance: Clear / S.010 / pH: x  Gluc: x / Ketone: Trace  / Bili: Negative / Urobili: Negative mg/dL   Blood: x / Protein: 15 mg/dL / Nitrite: Negative   Leuk Esterase: Trace / RBC: 6-10 /HPF / WBC 0-2   Sq Epi: x / Non Sq Epi: Few / Bacteria: Negative        RADIOLOGY & ADDITIONAL TESTS:< from: CT Abdomen and Pelvis w/ Oral Cont and w/ IV Cont (10.10.18 @ 14:41) >  EXAM:  CT ABDOMEN AND PELVIS OC IC                                  PROCEDURE DATE:  10/10/2018          INTERPRETATION:  CT ABDOMEN AND PELVIS    CLINICAL INFORMATION:  Left lower quadrant abdominal pain and diarrhea.    PROCEDURE:  Using multislice helical CT, oral contrast, and following the   intravenous administration of 95 cc Omnipaque 300 , 3 mm sections were   obtained from the domes of the diaphragm to the ischial tuberosities.    Multiplanar MPR's were performed.    COMPARISON: CT scan abdomen pelvis 2018.  FINDINGS:    The liver, spleen and gallbladder appear unremarkable.   The adrenal glands and pancreas are unremarkable in appearance.  There are bilateral renal cysts. There is a small indeterminate hypodense   lesion at the lower pole of the right kidney.  No hydroureteronephrosis is noted.  There is arteriosclerotic calcification of the abdominal aorta, which is   normal in caliber.  No enlarged retroperitoneal lymphadenopathy is noted.  There is a moderatesize hiatal hernia. There is possible gastric wall   thickening, correlate for gastritis.  There is fluid filled and liquid feces within the colon. There is mild   bowel wall thickening and mucosal hyperemia involving the rectosigmoid   colon, which may be associated with a proctocolitis.  Colonic diverticulosis is noted without CT evidence of diverticulitis.  No bowel obstruction is noted.  No localized intra-abdominal fluid collection or pneumoperitoneum is   noted.  There is a small fat-containing periumbilical hernia.  Urinary bladder appears unremarkable.  No pelvic mass or free fluid is noted.  There are multilevel degenerative changes of the lower thoracic and   lumbar spine.  There is partial ankylosis right sacroiliac joint.  Impression:  CT findings as discussed, which may be associated with a proctocolitis,   correlate clinically.  Colonic diverticulosis.  < end of copied text >   reviewed elctronically  ASSESSMENT/PLAN:
PROGRESS NOTE  Patient is a 83y old  Female who presents with a chief complaint of diarrhea (11 Oct 2018 09:14)  Chart and available morning labs /imaging are reviewed electronically , urgent issues addressed . More information  is being added upon completion of rounds , when more information is collected and management discussed with consultants , medical staff and social service/case management on the floor   OVERNIGHT  No new issues reported by medical staff . All above noted Had 2 loose BM last night ,but not today Ambulated with PT well ,no complains .OOB TC comfortable   Stool studies are pending Followed by GI   HPI:  84 YO WF with hx of BREAST CA, S/P LUMPECTOMY IN 2016 ,HTN ,HYPOTHYROIDISM ,HYPERPARATHYROIDISM ,DEPRESSION ,DEMENTIA ,CAD/LBBB,S/P CTS SURGERY, CATARACT SX ,recent admission with vasovagal syncope due to dehydration presents with diarrhea for the past 5 days ,associated with nausea, no vomiting. She complained of abdominal pain to aide a few days ago,has a poor po intake last few days ,  no vomiting. no fevers. Has not taken anything for pain or symptoms. patient is a poor historian , but able to recognise me and answer simple questions ,she has  history of dementia, no mental status changes per aide .CT abdomen was done and demonstrated proctocolitis ,admitted for GI workup Admitted for septic workup and evaluation,send blood and urine cx,serial lactate levels,monitor vitals closley,ivfs hydration,monitor urine output and renal profile,iv abx initiated -on cipro and falgyl Palliative care consult requested ,to discuss advance directives and complete /update MOLST (10 Oct 2018 16:34)  PAST MEDICAL & SURGICAL HISTORY:  Hyperparathyroidism  LBBB (left bundle branch block)  Depression  Malignant neoplasm of breast: h/o  Hypothyroidism  Hypertension  Dementia  Dementia  Cataract: b/l   History of lumpectomy of right breast: 2016  Carpal tunnel syndrome: repair -right   MEDICATIONS  (STANDING):  aspirin enteric coated 81 milliGRAM(s) Oral daily  buDESOnide  80 MICROgram(s)/formoterol 4.5 MICROgram(s) Inhaler 2 Puff(s) Inhalation two times a day  buPROPion XL . 300 milliGRAM(s) Oral daily  cholestyramine Powder (Sugar-Free) 4 Gram(s) Oral two times a day  ciprofloxacin   IVPB 400 milliGRAM(s) IV Intermittent every 12 hours  donepezil 10 milliGRAM(s) Oral at bedtime  enoxaparin Injectable 40 milliGRAM(s) SubCutaneous daily  famotidine    Tablet 20 milliGRAM(s) Oral two times a day  lactobacillus acidophilus 1 Tablet(s) Oral every 8 hours  letrozole 2.5 milliGRAM(s) Oral daily  levothyroxine 125 MICROGram(s) Oral daily  losartan 100 milliGRAM(s) Oral daily  melatonin 3 milliGRAM(s) Oral at bedtime  memantine 10 milliGRAM(s) Oral two times a day  metroNIDAZOLE  IVPB 500 milliGRAM(s) IV Intermittent every 8 hours  mirtazapine 15 milliGRAM(s) Oral at bedtime  potassium chloride    Tablet ER 40 milliEquivalent(s) Oral every 4 hours  QUEtiapine 25 milliGRAM(s) Oral two times a day  sodium chloride 0.9%. 1000 milliLiter(s) (50 mL/Hr) IV Continuous <Continuous>    MEDICATIONS  (PRN):  acetaminophen   Tablet .. 650 milliGRAM(s) Oral every 6 hours PRN Temp greater or equal to 38C (100.4F), Mild Pain (1 - 3)  ondansetron Injectable 4 milliGRAM(s) IV Push every 6 hours PRN Nausea and/or Vomiting      OBJECTIVE    T(C): 36.5 (10-12-18 @ 08:30), Max: 37.2 (10-11-18 @ 22:19)  HR: 76 (10-12-18 @ 08:30) (66 - 79)  BP: 116/72 (10-12-18 @ 08:30) (116/72 - 166/94)  RR: 18 (10-12-18 @ 08:30) (18 - 18)  SpO2: 96% (10-12-18 @ 08:30) (94% - 97%)  Wt(kg): --  I&O's Summary    11 Oct 2018 07:01  -  12 Oct 2018 07:00  --------------------------------------------------------  IN: 1600 mL / OUT: 600 mL / NET: 1000 mL          REVIEW OF SYSTEMS:  CONSTITUTIONAL: No fever, weight loss, or fatigue  EYES: No eye pain, visual disturbances, or discharge  ENMT:   No sinus or throat pain  NECK: No pain or stiffness  RESPIRATORY: No cough, wheezing, chills or hemoptysis; No shortness of breath  CARDIOVASCULAR: No chest pain, palpitations, dizziness, or leg swelling  GASTROINTESTINAL: No abdominal pain. No nausea, vomiting; No diarrhea today . No melena or hematochezia.  GENITOURINARY: No dysuria, frequency, hematuria, or incontinence  NEUROLOGICAL: No headaches, memory loss, loss of strength, numbness, or tremors  SKIN: No itching, burning, rashes, or lesions   MUSCULOSKELETAL: No joint pain or swelling; No muscle, back, or extremity pain    PHYSICAL EXAM:  Appearance: NAD. VS past 24 hrs -as above   HEENT:   Moist oral mucosa. Conjunctiva clear b/l.   Neck : supple  Respiratory: Lungs CTAB.  Gastrointestinal:  Soft, nontender. No rebound. No rigidity. BS present	  Cardiovascular: RRR ,S1S2 present  Neurologic: Non-focal. Moving all extremities.Confused   Extremities: No edema. No erythema. No calf tenderness.  Skin: No rashes, No ecchymoses, No cyanosis.	  wounds ,skin lesions-See skin assesment flow sheet   LABS:                        11.9   4.63  )-----------( 295      ( 12 Oct 2018 08:16 )             34.9     10-12    141  |  107  |  9   ----------------------------<  103<H>  3.2<L>   |  25  |  0.87    Ca    8.6      12 Oct 2018 08:16    TPro  5.5<L>  /  Alb  2.8<L>  /  TBili  0.3  /  DBili  x   /  AST  14  /  ALT  14  /  AlkPhos  63  10-12    CAPILLARY BLOOD GLUCOSE          Urinalysis Basic - ( 11 Oct 2018 02:51 )    Color: Yellow / Appearance: Clear / S.010 / pH: x  Gluc: x / Ketone: Trace  / Bili: Negative / Urobili: Negative mg/dL   Blood: x / Protein: 15 mg/dL / Nitrite: Negative   Leuk Esterase: Trace / RBC: 6-10 /HPF / WBC 0-2   Sq Epi: x / Non Sq Epi: Few / Bacteria: Negative        RADIOLOGY & ADDITIONAL TESTS:   reviewed elctronically  ASSESSMENT/PLAN:
PROGRESS NOTE  Patient is a 83y old  Female who presents with a chief complaint of diarrhea (13 Oct 2018 07:56)  Chart and available morning labs /imaging are reviewed electronically , urgent issues addressed . More information  is being added upon completion of rounds , when more information is collected and management discussed with consultants , medical staff and social service/case management on the floor     OVERNIGHT  No new issues reported by medical staff . All above noted Patient is resting in a bed comfortably .Confused ,poor mentation .No distress noted   No diarrhea x 2 days .Spoke to Dr Winslow ,ok to d/c home today on 10 days of po cipro and flagyl ,followup in GI office in 2 weeks   HPI:  84 YO WF with hx of BREAST CA, S/P LUMPECTOMY IN 2016 ,HTN ,HYPOTHYROIDISM ,HYPERPARATHYROIDISM ,DEPRESSION ,DEMENTIA ,CAD/LBBB,S/P CTS SURGERY, CATARACT SX ,recent admission with vasovagal syncope due to dehydration presents with diarrhea for the past 5 days ,associated with nausea, no vomiting. She complained of abdominal pain to aide a few days ago,has a poor po intake last few days ,  no vomiting. no fevers. Has not taken anything for pain or symptoms. patient is a poor historian , but able to recognise me and answer simple questions ,she has  history of dementia, no mental status changes per aide .CT abdomen was done and demonstrated proctocolitis ,admitted for GI workup Admitted for septic workup and evaluation,send blood and urine cx,serial lactate levels,monitor vitals closley,ivfs hydration,monitor urine output and renal profile,iv abx initiated -on cipro and falgyl Palliative care consult requested ,to discuss advance directives and complete /update MOLST (10 Oct 2018 16:34)    PAST MEDICAL & SURGICAL HISTORY:  Hyperparathyroidism  LBBB (left bundle branch block)  Depression  Malignant neoplasm of breast: h/o  Hypothyroidism  Hypertension  Dementia  Dementia  Cataract: b/l 2006  History of lumpectomy of right breast: 2016  Carpal tunnel syndrome: repair -right 2008      MEDICATIONS  (STANDING):  aspirin enteric coated 81 milliGRAM(s) Oral daily  buDESOnide  80 MICROgram(s)/formoterol 4.5 MICROgram(s) Inhaler 2 Puff(s) Inhalation two times a day  buPROPion XL . 300 milliGRAM(s) Oral daily  cholestyramine Powder (Sugar-Free) 4 Gram(s) Oral two times a day  ciprofloxacin   IVPB 400 milliGRAM(s) IV Intermittent every 12 hours  donepezil 10 milliGRAM(s) Oral at bedtime  enoxaparin Injectable 40 milliGRAM(s) SubCutaneous daily  famotidine    Tablet 20 milliGRAM(s) Oral two times a day  lactobacillus acidophilus 1 Tablet(s) Oral every 8 hours  letrozole 2.5 milliGRAM(s) Oral daily  levothyroxine 125 MICROGram(s) Oral daily  losartan 100 milliGRAM(s) Oral daily  magnesium oxide 400 milliGRAM(s) Oral once  melatonin 3 milliGRAM(s) Oral at bedtime  memantine 10 milliGRAM(s) Oral two times a day  metroNIDAZOLE  IVPB 500 milliGRAM(s) IV Intermittent every 8 hours  mirtazapine 15 milliGRAM(s) Oral at bedtime  QUEtiapine 25 milliGRAM(s) Oral two times a day  sodium phosphate IVPB 15 milliMole(s) IV Intermittent once    MEDICATIONS  (PRN):  acetaminophen   Tablet .. 650 milliGRAM(s) Oral every 6 hours PRN Temp greater or equal to 38C (100.4F), Mild Pain (1 - 3)  ondansetron Injectable 4 milliGRAM(s) IV Push every 6 hours PRN Nausea and/or Vomiting      OBJECTIVE    T(C): 36.9 (10-13-18 @ 08:42), Max: 36.9 (10-13-18 @ 08:42)  HR: 73 (10-13-18 @ 08:42) (72 - 87)  BP: 153/77 (10-13-18 @ 08:42) (122/75 - 165/71)  RR: 18 (10-13-18 @ 08:42) (18 - 18)  SpO2: 97% (10-13-18 @ 08:42) (97% - 98%)  Wt(kg): --  I&O's Summary    12 Oct 2018 07:01  -  13 Oct 2018 07:00  --------------------------------------------------------  IN: 650 mL / OUT: 0 mL / NET: 650 mL          REVIEW OF SYSTEMS:  ROS is unobtainable due to lethargy and confusion     PHYSICAL EXAM:  Appearance: NAD. VS past 24 hrs -as above   HEENT:   Moist oral mucosa. Conjunctiva clear b/l.   Neck : supple  Respiratory: Lungs CTAB.  Gastrointestinal:  Soft, nontender. No rebound. No rigidity. BS present	  Cardiovascular: RRR ,S1S2 present  Neurologic: Non-focal. Moving all extremities.  Extremities: No edema. No erythema. No calf tenderness.  Skin: No rashes, No ecchymoses, No cyanosis.	  wounds ,skin lesions-See skin assesment flow sheet   LABS:                        10.6   5.10  )-----------( 276      ( 13 Oct 2018 07:10 )             32.0     10-13    140  |  110<H>  |  7   ----------------------------<  103<H>  3.9   |  25  |  0.89    Ca    8.4      13 Oct 2018 07:10  Phos  2.1     10-13  Mg     1.7     10-13    TPro  5.5<L>  /  Alb  2.8<L>  /  TBili  0.3  /  DBili  x   /  AST  14  /  ALT  14  /  AlkPhos  63  10-12    CAPILLARY BLOOD GLUCOSE      POCT Blood Glucose.: 99 mg/dL (13 Oct 2018 08:02)          RADIOLOGY & ADDITIONAL TESTS:   reviewed elctronically  ASSESSMENT/PLAN: 	  Patient was seen and examined on a day of discharge . Plan of care , discharge medications and recommendations discussed with consultants and clearance for discharge obtained .Social service , case managment  and medical staff are aware of plan. Family is notified,spoke to the son Ari 10/12 and left a message today at 10 am [phone] . Discharge summary  is  prepared electronically
General

## 2018-10-13 NOTE — DISCHARGE NOTE ADULT - PLAN OF CARE
resolution of diarrhea encourage po fluids , complete antibacterial course , immodium as needed 1 tab by mouth every 4 hrs as needed diarrhea BP monitoring,continue current antihypertensive meds, low salt diet,followup with PMD continue home medications continue home medications -on femara continue current managmnet and medications

## 2018-10-13 NOTE — DISCHARGE NOTE ADULT - CARE PROVIDER_API CALL
Frankie Ramos), Internal Medicine  [address] [place] [address]  Phone: [phone]  Fax: [phone]    Ellis Winslow (), Gastroenterology; Internal Medicine  [address] [place] [address]  Phone: [phone]  Fax: [phone]

## 2018-10-13 NOTE — PROGRESS NOTE ADULT - NSHPATTENDINGPLANDISCUSS_GEN_ALL_CORE
med staff , GI consult ,amanda Chapman
team
med staff , son Ari on a phone  , aid at bedside  today  , physical therapist .
med staff , son Ari on a phone 10/10 , aid at bedside  today  , Dr Winslow .

## 2018-10-13 NOTE — PROGRESS NOTE ADULT - ATTENDING COMMENTS
OOBTC/PT .Advance care planning was d/w the family.Pain is accessed and addresed.Pt was screened for signs of clinical depression .Appropriate referral made.Risk of falls accessed.Fall prevention d/w family .Pt is screened for tobacco and etoh,counseling was done for etoh and tobacco cessation.Use of narcotic pain meds was discussed.Pt is advised to use narcotic meds  wisely and to avoid overusing them.Plan of care d/w family and all questions answered to best of my knowledge
Advanced care planning was discussed with patient and family.  Advanced care planning forms were reviewed and discussed.  Risks, benefits and alternatives of gastroenterologic procedures were discussed in detail and all questions were answered.    30 minutes spent.
82 YO WF with hx of BREAST CA, S/P LUMPECTOMY IN 2016 ,HTN ,HYPOTHYROIDISM ,HYPERPARATHYROIDISM ,DEPRESSION ,DEMENTIA ,CAD/LBBB,S/P CTS SURGERY, CATARACT SX ,recent admission with vasovagal syncope due to dehydration presents with diarrhea for the past 5 days ,associated with nausea, no vomiting. She complained of abdominal pain to aide a few days ago,has a poor po intake last few days ,  no vomiting. no fevers. Has not taken anything for pain or symptoms. patient is a poor historian , but able to recognise me and answer simple questions ,she has  history of dementia, no mental status changes per aide .CT abdomen was done and demonstrated proctocolitis ,admitted for GI workup Admitted for septic workup and evaluation,send blood and urine cx,serial lactate levels,monitor vitals windy nichols hydration,monitor urine output and renal profile,iv abx initiated -on cipro and falgyl Palliative care consult requested ,to discuss advance directives and complete /update MOL[facility]
84 YO WF with hx of BREAST CA, S/P LUMPECTOMY IN 2016 ,HTN ,HYPOTHYROIDISM ,HYPERPARATHYROIDISM ,DEPRESSION ,DEMENTIA ,CAD/LBBB,S/P CTS SURGERY, CATARACT SX ,recent admission with vasovagal syncope due to dehydration presents with diarrhea for the past 5 days ,associated with nausea, no vomiting. She complained of abdominal pain to aide a few days ago,has a poor po intake last few days ,  no vomiting. no fevers. Has not taken anything for pain or symptoms. patient is a poor historian , but able to recognise me and answer simple questions ,she has  history of dementia, no mental status changes per aide .CT abdomen was done and demonstrated proctocolitis ,admitted for GI workup Admitted for septic workup and evaluation,send blood and urine cx,serial lactate levels,monitor vitals windy nichols hydration,monitor urine output and renal profile,iv abx initiated -on cipro and falgyl Palliative care consult requested ,to discuss advance directives and complete /update MOLST

## 2018-10-13 NOTE — DISCHARGE NOTE ADULT - PATIENT PORTAL LINK FT
You can access the [facility] Patient Portal, offered by [facility], by registering with the following website: [email or web address]

## 2018-10-13 NOTE — DISCHARGE NOTE ADULT - MEDICATION SUMMARY - MEDICATIONS TO TAKE
I will START or STAY ON the medications listed below when I get home from the hospital:    metroNIDAZOLE 500 mg oral tablet  -- 1 tab(s) by mouth 3 times a day  -- Indication: For Proctocolitis    Aspir 81 oral delayed release tablet  -- 1 tab(s) by mouth once a day  -- Indication: For cad    naproxen 500 mg oral tablet  -- 1 tab(s) by mouth 2 times a day  -- Indication: For arthritis pain     losartan 100 mg oral tablet  -- 1 tab(s) by mouth once a day  -- Indication: For Htn HOLD FOR SBP BELOW 110    Remeron  -- 15 milligram(s) by mouth once a day (at bedtime)  -- Indication: For INSOMNIA    Imodium 2 mg oral capsule  -- 1 cap(s) by mouth every 4 hours, As Needed diarrhea x 1 week   -- Indication: For Diarrhea, unspecified type OTC, AS NEEDED     letrozole 2.5 mg oral tablet  -- 1 tab(s) by mouth once a day  -- Indication: For Hx of breast ca    SEROquel 25 mg oral tablet  -- orally 2 times a day  -- Indication: For Depression    Symbicort 80 mcg-4.5 mcg/inh inhalation aerosol  -- 2 puff(s) inhaled 2 times a day  -- Indication: For copd    donepezil 23 mg oral tablet  -- 1 tab(s) by mouth once a day (at bedtime)  -- Indication: For Dementia    raNITIdine 150 mg oral tablet  -- 1 tab(s) by mouth 2 times a day  -- Indication: For Prophylactic measure    Namenda XR 28 mg oral capsule, extended release  -- 1 cap(s) by mouth once a day. AM  -- Indication: For Dementia    Saline Nasal Mist  -- nasal 2 times a day  -- Indication: For rhinitis    lactobacillus acidophilus oral tablet  -- 2 tab(s) by mouth once a day  -- Indication: For Prophylactic measure    ciprofloxacin 500 mg oral tablet  -- 1 tab(s) by mouth every 12 hours  -- Indication: For Proctocolitis    Wellbutrin  -- 300 milligram(s) by mouth once a day  -- Indication: For Depression    Synthroid 125 mcg (0.125 mg) oral tablet  -- 1 tab(s) by mouth once a day  -- Indication: For Hypothyroidism    Vitamin D3 2000 intl units oral capsule  -- 1 cap(s) by mouth once a day  -- Indication: For Prophylactic measure

## 2018-10-31 ENCOUNTER — APPOINTMENT (OUTPATIENT)
Dept: GASTROENTEROLOGY | Facility: CLINIC | Age: 83
End: 2018-10-31

## 2018-11-11 PROCEDURE — 82962 GLUCOSE BLOOD TEST: CPT

## 2018-11-11 PROCEDURE — 87493 C DIFF AMPLIFIED PROBE: CPT

## 2018-11-11 PROCEDURE — 99285 EMERGENCY DEPT VISIT HI MDM: CPT | Mod: 25

## 2018-11-11 PROCEDURE — 96360 HYDRATION IV INFUSION INIT: CPT

## 2018-11-11 PROCEDURE — 97110 THERAPEUTIC EXERCISES: CPT

## 2018-11-11 PROCEDURE — 97116 GAIT TRAINING THERAPY: CPT

## 2018-11-11 PROCEDURE — 97161 PT EVAL LOW COMPLEX 20 MIN: CPT

## 2018-11-11 PROCEDURE — 80048 BASIC METABOLIC PNL TOTAL CA: CPT

## 2018-11-11 PROCEDURE — 81001 URINALYSIS AUTO W/SCOPE: CPT

## 2018-11-11 PROCEDURE — 94640 AIRWAY INHALATION TREATMENT: CPT

## 2018-11-11 PROCEDURE — 83690 ASSAY OF LIPASE: CPT

## 2018-11-11 PROCEDURE — 84100 ASSAY OF PHOSPHORUS: CPT

## 2018-11-11 PROCEDURE — 85730 THROMBOPLASTIN TIME PARTIAL: CPT

## 2018-11-11 PROCEDURE — 85027 COMPLETE CBC AUTOMATED: CPT

## 2018-11-11 PROCEDURE — 85610 PROTHROMBIN TIME: CPT

## 2018-11-11 PROCEDURE — 74177 CT ABD & PELVIS W/CONTRAST: CPT

## 2018-11-11 PROCEDURE — 80053 COMPREHEN METABOLIC PANEL: CPT

## 2018-11-11 PROCEDURE — 71045 X-RAY EXAM CHEST 1 VIEW: CPT

## 2018-11-11 PROCEDURE — 83735 ASSAY OF MAGNESIUM: CPT

## 2018-11-11 PROCEDURE — 36415 COLL VENOUS BLD VENIPUNCTURE: CPT

## 2018-12-28 ENCOUNTER — TRANSCRIPTION ENCOUNTER (OUTPATIENT)
Age: 83
End: 2018-12-28

## 2019-02-25 NOTE — ED ADULT NURSE NOTE - MODE OF DISCHARGE
Patient is visible in the community, mood is brightened  Cooperative with medications  Fleeting ey contact  Patient is cooperative  Will continue to monitor  Ambulatory

## 2019-03-24 NOTE — ED ADULT NURSE NOTE - PMH
Dementia    Depression    Hyperparathyroidism    Hypertension    Hypothyroidism    LBBB (left bundle branch block)    Malignant neoplasm of breast  h/o
Self

## 2019-06-13 ENCOUNTER — HOSPITAL ENCOUNTER (INPATIENT)
Dept: HOSPITAL 74 - JER | Age: 84
LOS: 5 days | Discharge: HOME | DRG: 312 | End: 2019-06-18
Attending: SPECIALIST | Admitting: SPECIALIST
Payer: COMMERCIAL

## 2019-06-13 VITALS — BODY MASS INDEX: 21.4 KG/M2

## 2019-06-13 DIAGNOSIS — E03.9: ICD-10-CM

## 2019-06-13 DIAGNOSIS — T44.1X5A: ICD-10-CM

## 2019-06-13 DIAGNOSIS — E88.09: ICD-10-CM

## 2019-06-13 DIAGNOSIS — E78.5: ICD-10-CM

## 2019-06-13 DIAGNOSIS — F41.9: ICD-10-CM

## 2019-06-13 DIAGNOSIS — F03.90: ICD-10-CM

## 2019-06-13 DIAGNOSIS — Z91.81: ICD-10-CM

## 2019-06-13 DIAGNOSIS — Z85.3: ICD-10-CM

## 2019-06-13 DIAGNOSIS — G30.9: ICD-10-CM

## 2019-06-13 DIAGNOSIS — I10: ICD-10-CM

## 2019-06-13 DIAGNOSIS — R55: Primary | ICD-10-CM

## 2019-06-13 DIAGNOSIS — M54.9: ICD-10-CM

## 2019-06-13 DIAGNOSIS — F02.80: ICD-10-CM

## 2019-06-13 DIAGNOSIS — I44.7: ICD-10-CM

## 2019-06-13 DIAGNOSIS — E86.0: ICD-10-CM

## 2019-06-13 LAB
ALBUMIN SERPL-MCNC: 3.2 G/DL (ref 3.4–5)
ALP SERPL-CCNC: 104 U/L (ref 45–117)
ALT SERPL-CCNC: 11 U/L (ref 13–61)
ANION GAP SERPL CALC-SCNC: 4 MMOL/L (ref 8–16)
AST SERPL-CCNC: 12 U/L (ref 15–37)
BILIRUB SERPL-MCNC: 0.3 MG/DL (ref 0.2–1)
BUN SERPL-MCNC: 13.9 MG/DL (ref 7–18)
CALCIUM SERPL-MCNC: 9.2 MG/DL (ref 8.5–10.1)
CHLORIDE SERPL-SCNC: 105 MMOL/L (ref 98–107)
CO2 SERPL-SCNC: 30 MMOL/L (ref 21–32)
CREAT SERPL-MCNC: 1.1 MG/DL (ref 0.55–1.3)
DEPRECATED RDW RBC AUTO: 13 % (ref 11.6–15.6)
GLUCOSE SERPL-MCNC: 101 MG/DL (ref 74–106)
HCT VFR BLD CALC: 35.7 % (ref 32.4–45.2)
HGB BLD-MCNC: 11.8 GM/DL (ref 10.7–15.3)
MCH RBC QN AUTO: 30.2 PG (ref 25.7–33.7)
MCHC RBC AUTO-ENTMCNC: 33.1 G/DL (ref 32–36)
MCV RBC: 91.2 FL (ref 80–96)
PLATELET # BLD AUTO: 455 K/MM3 (ref 134–434)
PMV BLD: 7 FL (ref 7.5–11.1)
POTASSIUM SERPLBLD-SCNC: 4.3 MMOL/L (ref 3.5–5.1)
PROT SERPL-MCNC: 6.4 G/DL (ref 6.4–8.2)
RBC # BLD AUTO: 3.91 M/MM3 (ref 3.6–5.2)
SODIUM SERPL-SCNC: 140 MMOL/L (ref 136–145)
WBC # BLD AUTO: 9.7 K/MM3 (ref 4–10)

## 2019-06-13 RX ADMIN — MEMANTINE SCH MG: 10 TABLET ORAL at 22:09

## 2019-06-13 RX ADMIN — ACETAMINOPHEN SCH MG: 325 TABLET ORAL at 22:09

## 2019-06-13 RX ADMIN — RANITIDINE SCH MG: 150 TABLET ORAL at 22:09

## 2019-06-13 NOTE — PDOC
*Physical Exam





- Vital Signs


 Last Vital Signs











Temp Pulse Resp BP Pulse Ox


 


 98.0 F   71   16   120/57 L  100 


 


 06/13/19 12:03  06/13/19 12:03  06/13/19 12:03  06/13/19 12:03  06/13/19 12:03














ED Treatment Course





- LABORATORY


CBC & Chemistry Diagram: 


 06/13/19 12:30





 06/13/19 12:30





- ADDITIONAL ORDERS


Additional order review: 


 











  06/13/19





  12:30


 


RBC  3.91


 


MCV  91.2


 


MCHC  33.1


 


RDW  13.0


 


MPV  7.0 L














*DC/Admit/Observation/Transfer





- Referrals


Referrals: 


ON STAFF,NOT [Primary Care Provider] - 





- Patient Instructions





- Post Discharge Activity

## 2019-06-13 NOTE — PDOC
Documentation entered by Soila Mayer SCRIBE, acting as scribe for 

Amrita Zuniga MD.








Amrita Zuniga MD:  This documentation has been prepared by the Leyla girard Nirvannie, SCRIBE, under my direction and personally reviewed by me in 

its entirety.  I confirm that the documentation accurately reflects all work, 

treatment, procedures, and medical decision making performed by me.  





Attending Attestation





- Resident


Resident Name: Meg Schwab





- ED Attending Attestation


I have performed the following: I have examined & evaluated the patient, The 

case was reviewed & discussed with the resident, I agree w/resident's findings 

& plan





- HPI


HPI: 





06/13/19 13:22


The patient is an 84 year old female, with a significant past medical history 

of HTN, HLD, Hypothyroidism, dementia, chronic back pain, Breast cancer 5 yrs 

ago s/p Right lumpectomy, Anxiety, Arthritis, who presents to the emergency 

department s/p syncope. As per NH and son at bedside, the patient has not been 

eating much the past few days prior to the onset of her symptoms. As per son, 

she has experienced approx. 3 times over the past 2 years at which time she was 

placed on medications by her psychiatrist to increase her appetite (recently 

dosage was lowered of this medication). History was primarily obtained via NH 

and son at bedside secondary to patients mild dementia. 





She denies recent fevers, chills, headache or dizziness. She denies recent 

nausea, vomit, diarrhea or constipation. She denies recent  dysuria, frequency, 

urgency or hematuria. She denies recent chest pain or shortness of breath.





Allergies: Tylenol, cefuroxime, hydrocodone, Metoprolol


Social history: [facility] Resident








- Physicial Exam


PE: 





06/13/19 13:22


GENERAL: +Cachetic . Awake, alert, and fully oriented, in no acute distress


HEAD: No signs of trauma


ENT: Hearing grossly normal.


NECK: Normal ROM, supple, no lymphadenopathy, JVD, or masses


LUNGS: Breath sounds equal, clear to auscultation bilaterally.  No wheezes, and 

no crackles


HEART: Regular rate and rhythm, normal S1 and S2, no murmurs, rubs or gallops


ABDOMEN: Soft, nontender, normoactive bowel sounds.  No guarding, no rebound.  

No masses


EXTREMITIES: Normal range of motion, no edema.  No clubbing or cyanosis. No 

cords, erythema, or tenderness


NEUROLOGICAL: Cranial nerves II through XII grossly intact.  Normal speech.


SKIN: Warm, Dry, normal turgor, no rashes or lesions noted.








- Medical Decision Making





06/13/19 13:44


Pt presents to the ED complaining of witnessed syncope and decreased PO intake.

  History of alzheimers, with prior episodes of decreased PO intake which 

resolved with medicine.  Differential includes cardiac arrythmia, ACS, 

orthostatic hypotension, neurogenic syncope.  Will check labs and cardiac 

enzymes and admit for observation,

## 2019-06-13 NOTE — HP
Admitting History and Physical





- Admission


History of Present Illness: 











The patient is an 84 year old female, with a significant past medical history 

of HTN, HLD, Hypothyroidism, dementia, chronic back pain, Breast cancer 5 yrs 

ago s/p Right lumpectomy, Anxiety, Arthritis, who presents to the emergency 

department s/p syncope. As per NH and son at bedside, the patient has not been 

eating much the past few days prior to the onset of her symptoms. As per son, 

she has experienced approx. 3 times over the past 2 years at which time she was 

placed on medications by her psychiatrist to increase her appetite (recently 

dosage was lowered of this medication). History was primarily obtained via 

Residence and son at bedside secondary to patients mild dementia. 





She denies recent fevers, chills, headache or dizziness. She denies recent 

nausea, vomit, diarrhea or constipation. She denies recent  dysuria, frequency, 

urgency or hematuria. She denies recent chest pain or shortness of breath.





Allergies: Tylenol, cefuroxime, hydrocodone, Metoprolol


Social history: [facility] Resident


History Source: Family Member, Medical Record


Limitations to Obtaining History: Dementia, Poor Historian





- Past Medical History


CNS: Yes: Dementia


Cardiovascular: Yes: HTN.  No: AFIB, CHF, MI


Pulmonary: No: Asthma, COPD, O2 Dependent, Previously Intubated


Reproductive: Yes: Postmenopausal


Heme/Onc: Yes: Cancer, Current Chemotherapy


Endocrine: Yes: Hypothyroidism





- Smoking History


Smoking history: Unknown if ever smoked


Have you smoked in the past 12 months: No





- Alcohol/Substance Use


Hx Alcohol Use: No





- Social History


Usual Living Arrangement: Yes: Assisted Living


ADL: Support Services





Home Medications





- Allergies


Allergies/Adverse Reactions: 


 Allergies











Allergy/AdvReac Type Severity Reaction Status Date / Time


 


cefuroxime [From Ceftin] Allergy   Verified 06/13/19 13:26


 


hydrocodone Allergy   Verified 06/13/19 13:26


 


metoprolol Allergy   Verified 06/13/19 13:26














- Home Medications


Home Medications: 


Ambulatory Orders





Aspirin 81 mg PO DAILY 06/13/19 


Bupropion HCl [Bupropion Xl] 150 mg PO DAILY 06/13/19 


Cyanocobalamin (Vitamin B-12) [Vitamin B-12] 1,000 mcg SL DAILY 06/13/19 


Letrozole [Femara -] 2.5 mg PO DAILY 06/13/19 


Loratadine 10 mg PO DAILY 06/13/19 


Losartan Potassium 100 mg PO DAILY 06/13/19 


Memantine HCl [Memantine HCl ER] 28 mg PO DAILY 06/13/19 


Ranitidine HCl 150 mg PO BID 06/13/19 


Donepezil HCl [Aricept -] 10 mg PO HS 30 Days #30 tablet 06/18/19 


Levothyroxine [Synthroid -] 88 mcg PO DAILY@0700 30 Days #30 tablet 06/18/19 











Review of Systems





- Review of Systems


Constitutional: reports: No Symptoms


Eyes: reports: No Symptoms


HENT: reports: No Symptoms


Neck: reports: No Symptoms


Cardiovascular: reports: No Symptoms


Respiratory: reports: No Symptoms


Gastrointestinal: reports: No Symptoms


Genitourinary: reports: No Symptoms


Breasts: reports: No Symptoms Reported


Musculoskeletal: reports: No Symptoms


Integumentary: reports: No Symptoms


Neurological: reports: No Symptoms


Endocrine: reports: No Symptoms


Hematology/Lymphatic: reports: No Symptoms


Psychiatric: reports: No Symptoms





Physical Examination


Vital Signs: 


 Vital Signs











Temperature  98.0 F   06/13/19 12:03


 


Pulse Rate  71   06/13/19 12:03


 


Respiratory Rate  16   06/13/19 12:03


 


Blood Pressure  120/57 L  06/13/19 12:03


 


O2 Sat by Pulse Oximetry (%)  100   06/13/19 12:03











Labs: 


 CBC, BMP





 06/13/19 12:30 





 06/13/19 12:30 











Problem List





- Problems


(1) Syncope


Code(s): R55 - SYNCOPE AND COLLAPSE   





(2) HTN (hypertension)


Code(s): I10 - ESSENTIAL (PRIMARY) HYPERTENSION   





(3) Dementia


Code(s): F03.90 - UNSPECIFIED DEMENTIA WITHOUT BEHAVIORAL DISTURBANCE   





(4) Hypothyroid


Code(s): E03.9 - HYPOTHYROIDISM, UNSPECIFIED   





(5) Hyperlipemia


Code(s): E78.5 - HYPERLIPIDEMIA, UNSPECIFIED   





(6) Breast CA


Code(s): C50.919 - MALIGNANT NEOPLASM OF UNSP SITE OF UNSPECIFIED FEMALE BREAST

   





Assessment/Plan








#Syncope


     Cardio consult 


     EKG reviewed-LBBB


     ECHO ordered 


     Telemetry 


     Neuro consult 


    





#HTN


    continue home meds 


     monitor BP 


     ck for orthostatic 


   





#Hypoalbuminemia


    Prosource ordered





#Dementia


   Behavior stable


   Cont Namenda 10 BID and Donepezil 





#Hypothyroidism


   Levothyroxine decreased 


   follow TSH /T4 as out patient 





#HLD


   Chol/fat controlled diet 





#Breast Ca


   Cont Letrozole

## 2019-06-13 NOTE — CON.CARD
Consult


Consult Specialty:: Cardiology


Referred by:: Medicine


Reason for Consultation:: syncope





- History of Present Illness


Chief Complaint: syncope


History of Present Illness: 


84F h/o HTN, HLD, hypothyroidism, dementia, back pain, breast cancer s/p 

lumpectomy p/w syncope.  History from patient's sons, had three prior episodes 

of passing out in the last couple of years was told in the past it was 

dehydration.  Had poor PO intake the last few days, lives at [facility].  Today 

she was sitting with an aid and noted to be slumping down. Patient unable to 

provide further hx due to dementia.  No chest pain, palps, dyspnea, edema 

currently.  





- Past Medical History


CNS: Yes: Dementia


Cardio/Vascular: Yes: HTN.  No: AFIB, CHF, MI


Pulmonary: No: Asthma, COPD, O2 Dependent, Previously Intubated


Endocrine: Yes: Hypothyroidism





- Alcohol/Substance Use


Hx Alcohol Use: No





- Smoking History


Smoking history: Unknown if ever smoked


Have you smoked in the past 12 months: No





- Social History


ADL: Support Services





Home Medications





- Allergies


Allergies/Adverse Reactions: 


 Allergies











Allergy/AdvReac Type Severity Reaction Status Date / Time


 


cefuroxime [From Ceftin] Allergy   Verified 06/13/19 13:26


 


hydrocodone Allergy   Verified 06/13/19 13:26


 


metoprolol Allergy   Verified 06/13/19 13:26














- Home Medications


Home Medications: 


Ambulatory Orders





Acetaminophen 650 mg PO TID 06/13/19 


Aspirin 81 mg PO DAILY 06/13/19 


Azelastine HCl 137 mcg NS BID 06/13/19 


Bupropion HCl [Bupropion Xl] 150 mg PO DAILY 06/13/19 


Cyanocobalamin (Vitamin B-12) [Vitamin B-12] 1,000 mcg SL DAILY 06/13/19 


Donepezil HCl 23 mg PO DAILY 06/13/19 


Letrozole [Femara -] 2.5 mg PO DAILY 06/13/19 


Levothyroxine [Synthroid -] 112 mcg PO DAILY 06/13/19 


Loratadine 10 mg PO DAILY 06/13/19 


Losartan Potassium 100 mg PO DAILY 06/13/19 


Memantine HCl [Memantine HCl ER] 28 mg PO DAILY 06/13/19 


Mirtazapine 30 mg PO HS 06/13/19 


Ranitidine HCl 150 mg PO BID 06/13/19 


Vitamin D3 - 50,000 units PO WEEKLY 06/13/19 











Family Disease History





- Family Disease History


Family History: Unremarkable





Review of Systems





- Review of Systems


Constitutional: reports: No Symptoms


Eyes: reports: No Symptoms


HENT: reports: No Symptoms


Neck: reports: No Symptoms


Cardiovascular: reports: No Symptoms


Respiratory: reports: No Symptoms


Gastrointestinal: reports: No Symptoms


Genitourinary: reports: No Symptoms


Musculoskeletal: reports: No Symptoms


Integumentary: reports: No Symptoms


Neurological: reports: No Symptoms


Endocrine: reports: No Symptoms


Hematology/Lymphatic: reports: No Symptoms


Psychiatric: reports: No Symptoms


Vital Signs: 


 Vital Signs











Temperature  98.0 F   06/13/19 12:03


 


Pulse Rate  72   06/13/19 15:07


 


Respiratory Rate  16   06/13/19 15:07


 


Blood Pressure  120/80   06/13/19 15:07


 


O2 Sat by Pulse Oximetry (%)  100   06/13/19 15:07











Constitutional: Yes: Well Nourished, No Distress


Eyes: Yes: Conjunctiva Clear


HENT: Yes: Atraumatic, Normocephalic


Neck: Yes: Supple, Trachea Midline


Respiratory: Yes: Regular, CTA Bilaterally


Gastrointestinal: Yes: Normal Bowel Sounds, Soft


Cardiovascular: Yes: Regular Rate and Rhythm


JVD: No


Carotid Bruit: No


PMI: Non-Displaced


Heart Sounds: Yes: S1, S2


Musculoskeletal: No: Back Pain


Extremities: No: Cold


Edema: No


Peripheral Pulses WNL: No


Peripheral Pulses: 1+ Left Doralis Pedis, 1+ Right Dorsalis Pedis


Integumentary: No: Jaundice


Neurological: Yes: Alert, Oriented


Psychiatric: No: Agitated





- Other Data


Labs, Other Data: 


 CBC, BMP





 06/13/19 12:30 





 06/13/19 12:30 





 Troponin, BNP











  06/13/19





  12:30


 


Troponin I  < 0.02








 Troponin, BNP











  06/13/19





  12:30


 


Troponin I  < 0.02














Assessment/Plan





EKG sinus, LBBB





CXR: no acute process





tele: sinus








Syncope


- patient unable to give further hx due to dementia


- may be 2/2 dehydration given poor PO intake


- monitoring on tele


- echo, carotid ultrasound ordered


- neuro consulted





abnormal EKG, LBBB


- no prior EKG for comparison


- no chest pain, trop neg x 1


- echo ordered





HTN


- cont home meds





HLD


- cont statin





hypothyroidism


- TSH 0.03 - manage per primary

## 2019-06-13 NOTE — PDOC
History of Present Illness





- General


Chief Complaint: Syncope/Near Syncope


Stated Complaint: SYNCOPE


Time Seen by Provider: 06/13/19 12:19





- History of Present Illness


Initial Comments: 





06/13/19 12:46





PCP: Dr. Mas





Patient is an 84 year old female, new pt at [facility], from [address] [place] [address] presented to 

the ED via ambulance after she had an episode of syncope this morning. Son, 

present at bed side mentions that he got a call from the NH and was told 

patient hasn't been eating and drinking well since few days and "fainted" this 

morning. 


Patient doesn't recall of the event. Now denies headache, blurring of vision, 

vertigo, weakness, numbness, chest pain, sob, cough, palpitation, fever, chills

, rigors, sweating. 


Bowel/Bladder habit normal. 


Sleep/Appetite normal as per the patient. 





Patient used to live at home with 24 hour health aid in [place]. But since 

past month, she has been living at [address] [place] [address]. Son mentions that she has had 

similar episode in the past due to dehydration. Since few months, has worsening 

short term memory loss.





Call placed to [facility]. RN was applying lidocaine while patient was standing, 

then leaned towards the med cart. Was able to take a step and then fainted. Didn

't fall, didn't hit her head as the RN was holding her. 





Past Medical Hx: HTN, HLD, Hypothyroidism, chronic back pain, Breast cancer 5 

yrs ago s/p Right lumpectomy, Anxiety, Arthritis


Allergies: Tyelnol, cefuroxime, hydrocodone, Metoprolol


Past Surgical Hx: As mentioned above


Social: Lives at [facility]


Smoking: Denies


Alcohol: Denies


Drugs: Denies


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 





Medications: Confirmed meds 


Synthroid 125 mcg


Aspirin 81 mg


Walbutrin  mg 


Aricept 23 mg


Letrozole 2.5 mg 


Claritin 10 mg


Losartan 100 mg


[id number] mg


Remeron 30 mg


Avelastin nasal spray 


Tylenol PRN for pain RN from [facility] confirms she is not allergic to tylenol


Ranitidine 150 mg 








Past History





- Travel


Traveled outside of the country in the last 30 days: Yes


Close contact w/someone who was outside of country & ill: No





- Past Medical History


Allergies/Adverse Reactions: 


 Allergies











Allergy/AdvReac Type Severity Reaction Status Date / Time


 


cefuroxime [From Ceftin] Allergy   Verified 06/13/19 13:26


 


hydrocodone Allergy   Verified 06/13/19 13:26


 


metoprolol Allergy   Verified 06/13/19 13:26











Home Medications: 


Ambulatory Orders





Acetaminophen 650 mg PO TID 06/13/19 


Aspirin 81 mg PO DAILY 06/13/19 


Azelastine HCl 137 mcg NS BID 06/13/19 


Bupropion HCl [Bupropion Xl] 150 mg PO DAILY 06/13/19 


Cyanocobalamin (Vitamin B-12) [Vitamin B-12] 1,000 mcg SL DAILY 06/13/19 


Donepezil HCl 23 mg PO DAILY 06/13/19 


Letrozole [Femara -] 2.5 mg PO DAILY 06/13/19 


Levothyroxine [Synthroid -] 112 mcg PO DAILY 06/13/19 


Loratadine 10 mg PO DAILY 06/13/19 


Losartan Potassium 100 mg PO DAILY 06/13/19 


Memantine HCl [Memantine HCl ER] 28 mg PO DAILY 06/13/19 


Mirtazapine 30 mg PO HS 06/13/19 


Ranitidine HCl 150 mg PO BID 06/13/19 


Vitamin D3 - 50,000 units PO WEEKLY 06/13/19 








Cancer: Yes (breast)


COPD: No


Dementia: Yes


HTN: Yes


Psychiatric Problems: Yes (anxiety)


Thyroid Disease: Yes (hyperthyroidism)


Other medical history: frequent falls, wondering,





- Immunization History


Immunization Up to Date: No





- Suicide/Smoking/Psychosocial Hx


Smoking History: Unknown if ever smoked


Have you smoked in the past 12 months: No


Information on smoking cessation initiated: No


Hx Alcohol Use: No


Drug/Substance Use Hx: No





**Review of Systems





- Review of Systems


Able to Perform ROS?: Yes


Is the patient limited English proficient: No


Constitutional: Yes: See HPI


HEENTM: Yes: See HPI


Respiratory: Yes: See HPI


Cardiac (ROS): Yes: See HPI





*Physical Exam





- Vital Signs


 Last Vital Signs











Temp Pulse Resp BP Pulse Ox


 


 98 F   71   16   124/58 L  96 


 


 06/13/19 11:35  06/13/19 11:35  06/13/19 11:35  06/13/19 11:35  06/13/19 11:35














- Physical Exam


Comments: 





06/13/19 13:02





General: Elderly female, lying comfortably in bed, awake, alert, oriented x 2, 

in no acute distress. 


HEENT: EOM intact, no pallor or icterus. 


Chest: B/L lungs clear, no added sounds.


CVS: Regular rate and rhythm, S1, S2, no murmurs


Abdomen: Soft, non tender, no organomegaly, BS +


Ext: No peripheral edema. 


Neuro: No facial droop, power 5/5 in all ext, CN 2-12. 











ED Treatment Course





- LABORATORY


CBC & Chemistry Diagram: 


 06/13/19 12:30





 06/13/19 12:30





Medical Decision Making





- Medical Decision Making





06/13/19 13:05





Patient is an 84 year old female, new pt at [facility], from [facility] presented to the 

ED with an episode of Syncope this morning. Pt doesn't recall the details. 


Call placed to [facility] resident NH, confirmed with RN that she didn't fall or 

hit her head and pt hasn't been eating or drinking





Differential diagnosis: Dehydration, Arrythmia, vasovagal syncope. 





Will send CBC, CMP, Trops, UA, EKG


NS 500mls bolus for dehydration. 





06/13/19 14:44


CBC, CMP- normal. Trops negative. 


Case discussed with Dr. Britton who accepts patient for admission/Tele. 








*DC/Admit/Observation/Transfer


Diagnosis at time of Disposition: 


 Syncope, Dementia, HTN (hypertension), Hypothyroid








- Referrals





- Patient Instructions





- Post Discharge Activity

## 2019-06-14 LAB
ALBUMIN SERPL-MCNC: 2.8 G/DL (ref 3.4–5)
ALP SERPL-CCNC: 93 U/L (ref 45–117)
ALT SERPL-CCNC: 12 U/L (ref 13–61)
ANION GAP SERPL CALC-SCNC: 8 MMOL/L (ref 8–16)
AST SERPL-CCNC: 13 U/L (ref 15–37)
BASOPHILS # BLD: 1.2 % (ref 0–2)
BILIRUB SERPL-MCNC: 0.4 MG/DL (ref 0.2–1)
BUN SERPL-MCNC: 13.3 MG/DL (ref 7–18)
CALCIUM SERPL-MCNC: 9.1 MG/DL (ref 8.5–10.1)
CHLORIDE SERPL-SCNC: 107 MMOL/L (ref 98–107)
CHOLEST SERPL-MCNC: 165 MG/DL (ref 50–200)
CO2 SERPL-SCNC: 26 MMOL/L (ref 21–32)
CREAT SERPL-MCNC: 0.9 MG/DL (ref 0.55–1.3)
DEPRECATED RDW RBC AUTO: 13.1 % (ref 11.6–15.6)
EOSINOPHIL # BLD: 2.6 % (ref 0–4.5)
GLUCOSE SERPL-MCNC: 84 MG/DL (ref 74–106)
HCT VFR BLD CALC: 33.5 % (ref 32.4–45.2)
HDLC SERPL-MCNC: 69 MG/DL (ref 40–60)
HGB BLD-MCNC: 11.3 GM/DL (ref 10.7–15.3)
LYMPHOCYTES # BLD: 21 % (ref 8–40)
MAGNESIUM SERPL-MCNC: 2.1 MG/DL (ref 1.8–2.4)
MCH RBC QN AUTO: 30.9 PG (ref 25.7–33.7)
MCHC RBC AUTO-ENTMCNC: 33.8 G/DL (ref 32–36)
MCV RBC: 91.2 FL (ref 80–96)
MONOCYTES # BLD AUTO: 10.5 % (ref 3.8–10.2)
NEUTROPHILS # BLD: 64.7 % (ref 42.8–82.8)
PLATELET # BLD AUTO: 413 K/MM3 (ref 134–434)
PMV BLD: 7.4 FL (ref 7.5–11.1)
POTASSIUM SERPLBLD-SCNC: 4 MMOL/L (ref 3.5–5.1)
PROT SERPL-MCNC: 6 G/DL (ref 6.4–8.2)
RBC # BLD AUTO: 3.68 M/MM3 (ref 3.6–5.2)
SODIUM SERPL-SCNC: 141 MMOL/L (ref 136–145)
TRIGL SERPL-MCNC: 164 MG/DL (ref 0–150)
WBC # BLD AUTO: 8 K/MM3 (ref 4–10)

## 2019-06-14 RX ADMIN — ASPIRIN 81 MG SCH MG: 81 TABLET ORAL at 10:35

## 2019-06-14 RX ADMIN — LEVOTHYROXINE SODIUM SCH MCG: 100 TABLET ORAL at 06:49

## 2019-06-14 RX ADMIN — ACETAMINOPHEN SCH: 325 TABLET ORAL at 14:19

## 2019-06-14 RX ADMIN — LETROZOLE SCH MG: 2.5 TABLET, FILM COATED ORAL at 12:53

## 2019-06-14 RX ADMIN — RANITIDINE SCH MG: 150 TABLET ORAL at 21:56

## 2019-06-14 RX ADMIN — Medication SCH ML: at 17:05

## 2019-06-14 RX ADMIN — ACETAMINOPHEN SCH MG: 325 TABLET ORAL at 21:56

## 2019-06-14 RX ADMIN — MEMANTINE SCH MG: 10 TABLET ORAL at 10:34

## 2019-06-14 RX ADMIN — LORATADINE SCH MG: 10 TABLET ORAL at 10:35

## 2019-06-14 RX ADMIN — ACETAMINOPHEN SCH MG: 325 TABLET ORAL at 06:49

## 2019-06-14 RX ADMIN — MEMANTINE SCH MG: 10 TABLET ORAL at 21:56

## 2019-06-14 RX ADMIN — LOSARTAN POTASSIUM SCH MG: 50 TABLET, FILM COATED ORAL at 10:33

## 2019-06-14 RX ADMIN — RANITIDINE SCH MG: 150 TABLET ORAL at 10:35

## 2019-06-14 NOTE — PN
Progress Note (short form)





- Note


Progress Note: 





85 y/o female found reading in bed. Appears comfortable. Denies pain and 

discomfort.





 Vital Signs











 Period  Temp  Pulse  Resp  BP Sys/Sorensen  Pulse Ox


 


 Last 24 Hr  97.9 F-98.7 F  71-78  16-20  120-156/57-80  








 CBC, BMP





 06/14/19 06:05 





 06/14/19 06:05 





HEENT- NL


Neck- supple


Lungs- CTAB


Heart- S1/S2


Abd- Soft, NT


Ext- No LE edema





Active Medications





Acetaminophen (Tylenol -)  650 mg PO TID [facility]


   Last Admin: 06/14/19 06:49 Dose:  650 mg


Aspirin (Asa -)  81 mg PO DAILY [facility]


   Last Admin: 06/14/19 10:35 Dose:  81 mg


Bupropion HCl (Wellbutrin Xl -)  150 mg PO DAILY [facility]


   Last Admin: 06/14/19 10:34 Dose:  150 mg


Letrozole (Femara -)  2.5 mg PO DAILY [facility]


Levothyroxine Sodium (Synthroid -)  100 mcg PO DAILY@0700 [facility]


   Last Admin: 06/14/19 06:49 Dose:  100 mcg


Loratadine (Claritin -)  10 mg PO DAILY [facility]


   Last Admin: 06/14/19 10:35 Dose:  10 mg


Losartan Potassium (Cozaar -)  100 mg PO DAILY [facility]


   Last Admin: 06/14/19 10:33 Dose:  100 mg


Memantine (Namenda -)  10 mg PO BID [facility]


   Last Admin: 06/14/19 10:34 Dose:  10 mg


Non-Formulary Medication (Donepezil Hcl [Donepezil Hcl])  23 mg PO DAILY [facility]


Ranitidine HCl (Zantac -)  150 mg PO BID [facility]


   Last Admin: 06/14/19 10:35 Dose:  150 mg





#Syncope


Cardio consult appreciated


EKG reviewed- Lt and rt Ventricular systolic fxn nl


EF 60-65%


No sig aortic valvular stenosis or pericardial effusion


Neuro consult pending





#HTN


BP controlled 


Cont Arb





#Hypoalbuminemia


Prosource ordered





#Dementia


Behavior stable


Cont Namenda and Donepezil





#Hypothyroidism


Levothyroxine decreased to 100 mcg





#HLD


Chol/fat controlled diet 





#Breast Ca


Cont Letrozole





 Problem List 





- Problems


(1) Syncope


Code(s): R55 - SYNCOPE AND COLLAPSE   





(2) HTN (hypertension)


Code(s): I10 - ESSENTIAL (PRIMARY) HYPERTENSION   





(3) Dementia


Code(s): F03.90 - UNSPECIFIED DEMENTIA WITHOUT BEHAVIORAL DISTURBANCE   





(4) Hypothyroid


Code(s): E03.9 - HYPOTHYROIDISM, UNSPECIFIED   





(5) Hyperlipemia


Code(s): E78.5 - HYPERLIPIDEMIA, UNSPECIFIED   





(6) Breast CA


Code(s): C50.919 - MALIGNANT NEOPLASM OF UNSP SITE OF UNSPECIFIED FEMALE BREAST

## 2019-06-14 NOTE — EKG
Test Reason : 

Blood Pressure : ***/*** mmHG

Vent. Rate : 070 BPM     Atrial Rate : 070 BPM

   P-R Int : 132 ms          QRS Dur : 124 ms

    QT Int : 450 ms       P-R-T Axes : 041 -13 094 degrees

   QTc Int : 486 ms

 

NORMAL SINUS RHYTHM

LEFT BUNDLE BRANCH BLOCK

ABNORMAL ECG

NO PREVIOUS ECGS AVAILABLE

Confirmed by DAT RAMÍREZ MD ([id number]) on 6/14/2019 11:51:15 AM

 

Referred By:             Confirmed By:DAT RAMÍREZ MD

## 2019-06-14 NOTE — ECHO
______________________________________________________________________________



Name: SALVATORE RAMIREZ                                 Exam:Adult Echocardiogram

MRN: [id number]         Study Date: 2019 07:33 AM

Age: 84 yrs

______________________________________________________________________________



Reason For Study: SYNCOPE

Height: 64 in        Weight: 125 lb        BSA: 1.6 m2



______________________________________________________________________________



MMode/2D Measurements & Calculations

IVSd: 1.1 cm                                          Ao root diam: 2.7 cm

LVIDd: 3.4 cm                                         LA dimension: 2.3 cm

LVIDs: 2.6 cm

LVPWd: 1.2 cm



_______________________________________________________

EDV(Teich): 48.8 ml                                   LVOT diam: 2.0 cm

ESV(Teich): 23.9 ml



Doppler Measurements & Calculations

MV E max neil: 55.8 cm/sec                            Ao V2 max: 107.2 cm/sec

MV A max neil: 87.8 cm/sec                            Ao max P.6 mmHg

MV E/A: 0.64

MV dec time: 0.22 sec                                FRANKI(V,D): 2.6 cm2



______________________________________________________

LV V1 max PG: 3.4 mmHg                               Med Peak E' Neil: 5.5 cm/sec

LV V1 max: 91.7 cm/sec                               Med E/e': 10.1

                                                     Lat Peak E' Neil: 6.2 cm/sec

                                                     Lat E/e': 9.0





______________________________________________________________________________

Procedure

The study was technically difficult with many images being suboptimal in quality.

Left Ventricle

Left ventricular systolic function is grossly normal. Ejection Fraction = 60-65%. The transmitral spe
ctral

Doppler flow pattern is suggestive of impaired LV relaxation. Regional wall motion abnormalities tamra
ot be

excluded due to limited visualization.

Right Ventricle

The right ventricle is grossly normal size. The right ventricular systolic function is grossly normal
.

Atria

Normal left and right atrial size and function.

Mitral Valve

The mitral valve is normal in structure and function. There is no mitral valve stenosis. There is tra
ce mitral

regurgitation.

Aortic Valve

The aortic valve opens well. No hemodynamically significant valvular aortic stenosis. No aortic regur
gitation

is present.

Pulmonic Valve

The pulmonic valve is not well seen, but is grossly normal. There is no pulmonic valvular stenosis. T
here is

no pulmonic valvular regurgitation.

Great Vessels

The aortic root is normal size.

Pericardium/Pleura

There is no pericardial effusion.



______________________________________________________________________________



Interpretation Summary

The study was technically difficult with many images being suboptimal in quality.

Regional wall motion abnormalities cannot be excluded due to limited visualization.

Left ventricular systolic function is grossly normal.

Ejection Fraction = 60-65%.

The transmitral spectral Doppler flow pattern is suggestive of impaired LV relaxation.

The right ventricle is grossly normal size.

The right ventricular systolic function is grossly normal.

No hemodynamically significant valvular aortic stenosis.

There is no pericardial effusion.







MD Srivastava *Gloria 2019 10:35 AM

## 2019-06-14 NOTE — CONSULT
Consult - text type





- Consultation


Consultation Note: 








NEUROLOGY CONSULTATION is greatly appreciated:





Events reviewed. Patient examined.


This 84 you RH woman is a [facility] resident with h/o HTN, hypothyroidism, Breast 

cancer s/p lumpectomy, depression, chronic back pain and dementia.


Maintained on: Acetaminophen 650 mg PO TID; Azelastine; Bupropion; Donepezil 23 

mg; Letrozole; Levothyroxine 112 mcg; Losartan; Memantine HCl ER 28 mg; 

Mirtazapine 30 mg PO HS and Ranitidine HCl.


Admitted today after witnessed syncope.


CT of head -not found


Carotid duplex doppler mild atheromatous ds without hemodynamic changes.


Platelets=[id number]





DUY: No bruits. No head trauma


NEURO: awake, alert. Ox "[place].". Not in hospital. No month, year or 

president. Recalls 0 of 3 at 1 min.


           Speech sparse but fluent. + Glabella, snout, grasps.


          CN II-XII: Normal


          Motor: No drift. Normal strength. Normal reflexes.


          Sensation: Normal


          Gait: Slight shuffle.





IMP: Moderately severe B/L cerebral dysfunction (OMS, Chronic features) most c/

w Alzheimer's disease


       Syncope





SUGGEST: CT of head (c-)


              Check orthostatic BP's


              Continue telemetry (for bradycardia)


              Reduce Donepezil to 10 mg q AM- centrally acting Cholinesterase 

inhibitors increase the risk of Syncope, bradycardia and PPM Placement.


              Continue Memantine 10 mg BID.


              Check B12, TSH, RPR


              Empirically give thiamine 250 mg IVPB now and q 8 hrs x 3 days.





Thank you very much,


Stanton Collado MD

## 2019-06-14 NOTE — PN
Progress Note, Physician


Chief Complaint: 





no distress


Exopthalmos





- Current Medication List


Current Medications: 


Active Medications





Acetaminophen (Tylenol -)  650 mg PO TID [facility]


   Last Admin: 06/14/19 06:49 Dose:  650 mg


Aspirin (Asa -)  81 mg PO DAILY [facility]


Bupropion HCl (Wellbutrin Xl -)  150 mg PO DAILY [facility]


Letrozole (Femara -)  2.5 mg PO DAILY [facility]


Levothyroxine Sodium (Synthroid -)  100 mcg PO DAILY@0700 [facility]


   Last Admin: 06/14/19 06:49 Dose:  100 mcg


Loratadine (Claritin -)  10 mg PO DAILY [facility]


Losartan Potassium (Cozaar -)  100 mg PO DAILY [facility]


Memantine (Namenda -)  10 mg PO BID [facility]


   Last Admin: 06/13/19 22:09 Dose:  10 mg


Non-Formulary Medication (Donepezil Hcl [Donepezil Hcl])  23 mg PO DAILY [facility]


Ranitidine HCl (Zantac -)  150 mg PO BID [facility]


   Last Admin: 06/13/19 22:09 Dose:  150 mg











- Objective


Vital Signs: 


 Vital Signs











Temperature  98.3 F   06/14/19 05:00


 


Pulse Rate  78   06/14/19 05:00


 


Respiratory Rate  20   06/14/19 05:00


 


Blood Pressure  145/67   06/14/19 05:00


 


O2 Sat by Pulse Oximetry (%)  97   06/13/19 21:00











Constitutional: Yes: No Distress, Calm


Cardiovascular: Yes: Regular Rate and Rhythm


Respiratory: Yes: CTA Bilaterally


Gastrointestinal: Yes: Soft


Edema: No


Neurological: Yes: Alert, Oriented


...Motor Strength: WNL


Labs: 


 CBC, BMP





 06/14/19 06:05 





 06/14/19 06:05 





 Laboratory Tests











  06/14/19 06/14/19





  06:05 06:05


 


WBC   8.0


 


Hgb   11.3


 


Plt Count   413


 


Sodium  141 


 


Potassium  4.0 


 


Creatinine  0.9 














- ....Imaging


EKG: Image Reviewed (NSR on tele)





Assessment/Plan





Assessment/Plan





EKG sinus, LBBB





CXR: no acute process





tele: sinus








Syncope


- patient unable to give further hx due to dementia


- may be 2/2 dehydration given poor PO intake


- monitoring on tele


- echo pending, carotid mild plaque, no sig stenosis


- neuro consulted





abnormal EKG, LBBB


- no prior EKG for comparison


- no chest pain, trop neg x 1


- echo ordered





HTN


- cont home meds





HLD


- cont statin





hypothyroidism


- TSH 0.03 - manage per primary

## 2019-06-15 LAB
ANION GAP SERPL CALC-SCNC: 8 MMOL/L (ref 8–16)
BASOPHILS # BLD: 1.1 % (ref 0–2)
BUN SERPL-MCNC: 13.1 MG/DL (ref 7–18)
CALCIUM SERPL-MCNC: 8.8 MG/DL (ref 8.5–10.1)
CHLORIDE SERPL-SCNC: 105 MMOL/L (ref 98–107)
CO2 SERPL-SCNC: 27 MMOL/L (ref 21–32)
CREAT SERPL-MCNC: 1.1 MG/DL (ref 0.55–1.3)
DEPRECATED RDW RBC AUTO: 13 % (ref 11.6–15.6)
EOSINOPHIL # BLD: 3 % (ref 0–4.5)
GLUCOSE SERPL-MCNC: 91 MG/DL (ref 74–106)
HCT VFR BLD CALC: 32.3 % (ref 32.4–45.2)
HGB BLD-MCNC: 11.1 GM/DL (ref 10.7–15.3)
LYMPHOCYTES # BLD: 15.9 % (ref 8–40)
MCH RBC QN AUTO: 31.2 PG (ref 25.7–33.7)
MCHC RBC AUTO-ENTMCNC: 34.4 G/DL (ref 32–36)
MCV RBC: 90.7 FL (ref 80–96)
MONOCYTES # BLD AUTO: 10.8 % (ref 3.8–10.2)
NEUTROPHILS # BLD: 69.2 % (ref 42.8–82.8)
PLATELET # BLD AUTO: 395 K/MM3 (ref 134–434)
PMV BLD: 7.5 FL (ref 7.5–11.1)
POTASSIUM SERPLBLD-SCNC: 3.6 MMOL/L (ref 3.5–5.1)
RBC # BLD AUTO: 3.56 M/MM3 (ref 3.6–5.2)
SODIUM SERPL-SCNC: 140 MMOL/L (ref 136–145)
WBC # BLD AUTO: 8.1 K/MM3 (ref 4–10)

## 2019-06-15 RX ADMIN — LORATADINE SCH MG: 10 TABLET ORAL at 09:34

## 2019-06-15 RX ADMIN — LETROZOLE SCH MG: 2.5 TABLET, FILM COATED ORAL at 09:35

## 2019-06-15 RX ADMIN — RANITIDINE SCH MG: 150 TABLET ORAL at 09:34

## 2019-06-15 RX ADMIN — Medication SCH ML: at 08:11

## 2019-06-15 RX ADMIN — LOSARTAN POTASSIUM SCH MG: 50 TABLET, FILM COATED ORAL at 09:34

## 2019-06-15 RX ADMIN — ASPIRIN 81 MG SCH MG: 81 TABLET ORAL at 09:34

## 2019-06-15 RX ADMIN — ACETAMINOPHEN SCH MG: 325 TABLET ORAL at 14:17

## 2019-06-15 RX ADMIN — ACETAMINOPHEN SCH MG: 325 TABLET ORAL at 06:21

## 2019-06-15 RX ADMIN — MEMANTINE SCH MG: 10 TABLET ORAL at 09:35

## 2019-06-15 RX ADMIN — THIAMINE HYDROCHLORIDE SCH MG: 100 INJECTION, SOLUTION INTRAMUSCULAR; INTRAVENOUS at 14:18

## 2019-06-15 RX ADMIN — ACETAMINOPHEN SCH MG: 325 TABLET ORAL at 21:57

## 2019-06-15 RX ADMIN — Medication SCH ML: at 17:18

## 2019-06-15 RX ADMIN — THIAMINE HYDROCHLORIDE SCH MG: 100 INJECTION, SOLUTION INTRAMUSCULAR; INTRAVENOUS at 21:57

## 2019-06-15 RX ADMIN — RANITIDINE SCH MG: 150 TABLET ORAL at 21:57

## 2019-06-15 RX ADMIN — LEVOTHYROXINE SODIUM SCH MCG: 100 TABLET ORAL at 06:20

## 2019-06-15 NOTE — EKG
Test Reason : 

Blood Pressure : ***/*** mmHG

Vent. Rate : 072 BPM     Atrial Rate : 072 BPM

   P-R Int : 146 ms          QRS Dur : 130 ms

    QT Int : 444 ms       P-R-T Axes : 065 -33 087 degrees

   QTc Int : 486 ms

 

NORMAL SINUS RHYTHM

LEFT AXIS DEVIATION

LEFT BUNDLE BRANCH BLOCK

ABNORMAL ECG

WHEN COMPARED WITH ECG OF 13-JUN-2019 12:07,

NO SIGNIFICANT CHANGE WAS FOUND

Confirmed by MD KIYA, SAMEER ([id number]) on 6/15/2019 12:22:58 PM

 

Referred By: BRAN HOPSON DR           Confirmed By:SAMEER HUERTAS MD

## 2019-06-15 NOTE — PN
Progress Note (short form)





- Note


Progress Note: 


s: no chest pain, palps, dizziness, dyspnea





 Current Medications





Acetaminophen (Tylenol -)  650 mg PO TID [facility]


   Last Admin: 06/15/19 06:21 Dose:  650 mg


Amino Acids (Prosource No Carb Liquid Pkt)  30 ml PO BID@0800,1730 [facility]


   Last Admin: 06/15/19 08:11 Dose:  30 ml


Aspirin (Asa -)  81 mg PO DAILY [facility]


   Last Admin: 06/15/19 09:34 Dose:  81 mg


Bupropion HCl (Wellbutrin Xl -)  150 mg PO DAILY [facility]


   Last Admin: 06/15/19 09:34 Dose:  150 mg


Letrozole (Femara -)  2.5 mg PO DAILY [facility]


   Last Admin: 06/15/19 09:35 Dose:  2.5 mg


Levothyroxine Sodium (Synthroid -)  100 mcg PO DAILY@0700 [facility]


   Last Admin: 06/15/19 06:20 Dose:  100 mcg


Loratadine (Claritin -)  10 mg PO DAILY [facility]


   Last Admin: 06/15/19 09:34 Dose:  10 mg


Losartan Potassium (Cozaar -)  100 mg PO DAILY [facility]


   Last Admin: 06/15/19 09:34 Dose:  100 mg


Memantine (Namenda -)  10 mg PO BID [facility]


   Last Admin: 06/15/19 09:35 Dose:  10 mg


Non-Formulary Medication (Donepezil Hcl [Donepezil Hcl])  23 mg PO DAILY [facility]


Ranitidine HCl (Zantac -)  150 mg PO BID [facility]


   Last Admin: 06/15/19 09:34 Dose:  150 mg





 Vital Signs











 Period  Temp  Pulse  Resp  BP Sys/Sorensen  Pulse Ox


 


 Last 24 Hr  98.2 F-98.9 F  66-85  17-18  114-143/50-75  94-95














Constitutional: Yes: No Distress, Calm


Cardiovascular: Yes: Regular Rate and Rhythm


Respiratory: Yes: CTA Bilaterally


Gastrointestinal: Yes: Soft


Edema: No


Neurological: Yes: Alert, Oriented


...Motor Strength: WNL


no jaundice, diaphoresis


not agitated





- ....Imaging


EKG: Image Reviewed (NSR on tele)





Assessment/Plan





EKG sinus, LBBB





CXR: no acute process





tele: sinus





Syncope


- patient unable to give further hx due to dementia


- may be 2/2 dehydration given poor PO intake


- monitoring on tele - benign findings, can dc


- echo nl LV function, no significant valvular pathology, carotid mild plaque, 

no sig stenosis


- neuro consulted





abnormal EKG, LBBB


- no prior EKG for comparison


- no chest pain, trop neg x 1


- echo ordered





HTN


- cont home meds





HLD


- cont statin





hypothyroidism


- TSH 0.03 - manage per primary





stable for dc from cardiac perspective

## 2019-06-15 NOTE — PN
Progress Note (short form)





- Note


Progress Note: 





83 y/o female found resting in bed. 


Appears comfortable. 


Denies pain and discomfort.


oriented to place and person 


 Vital Signs











 Period  Temp  Pulse  Resp  BP Sys/Sorensen  Pulse Ox


 


 Last 24 Hr  98.2 F-98.9 F  66-85  17-18  114-143/50-75  94-95











HEENT- NL


Neck- supple


Lungs- CTAB


Heart- S1/S2


Abd- Soft, NT


Ext- No LE edema








 CBC, BMP





 06/15/19 06:10 





 06/15/19 06:10 





telemetry  - no arhythmia 











Active Medications





Acetaminophen (Tylenol -)  650 mg PO TID [facility]


   Last Admin: 06/15/19 06:21 Dose:  650 mg


Amino Acids (Prosource No Carb Liquid Pkt)  30 ml PO BID@0800,1730 [facility]


   Last Admin: 06/15/19 08:11 Dose:  30 ml


Aspirin (Asa -)  81 mg PO DAILY [facility]


   Last Admin: 06/15/19 09:34 Dose:  81 mg


Bupropion HCl (Wellbutrin Xl -)  150 mg PO DAILY [facility]


   Last Admin: 06/15/19 09:34 Dose:  150 mg


Letrozole (Femara -)  2.5 mg PO DAILY [facility]


   Last Admin: 06/15/19 09:35 Dose:  2.5 mg


Levothyroxine Sodium (Synthroid -)  100 mcg PO DAILY@0700 [facility]


   Last Admin: 06/15/19 06:20 Dose:  100 mcg


Loratadine (Claritin -)  10 mg PO DAILY [facility]


   Last Admin: 06/15/19 09:34 Dose:  10 mg


Losartan Potassium (Cozaar -)  100 mg PO DAILY [facility]


   Last Admin: 06/15/19 09:34 Dose:  100 mg


Memantine (Namenda -)  10 mg PO BID [facility]


   Last Admin: 06/15/19 09:35 Dose:  10 mg


Non-Formulary Medication (Donepezil Hcl [Donepezil Hcl])  23 mg PO DAILY [facility]


Ranitidine HCl (Zantac -)  150 mg PO BID [facility]


   Last Admin: 06/15/19 09:34 Dose:  150 mg








#Syncope


     Cardio consult appreciated


     EKG reviewed- Lt and rt Ventricular systolic fxn nl


                EF 60-65%


     No sig aortic valvular stenosis or pericardial effusion


     no significant ectopy on telemetry 


     Neuro consult noted and appreciated 


     will decrease Donepezil dose 





#HTN


    BP controlled 


    Cont Arb





#Hypoalbuminemia


    Prosource ordered





#Dementia


   Behavior stable


   Cont Namenda 10 BID and Donepezil 10 q am 





#Hypothyroidism


   Levothyroxine decreased to 88 mcg


   follow TSH /T4 as out patient 





#HLD


   Chol/fat controlled diet 





#Breast Ca


   Cont Letrozole








Problem List





- Problems


(1) Syncope


Code(s): R55 - SYNCOPE AND COLLAPSE   





(2) HTN (hypertension)


Code(s): I10 - ESSENTIAL (PRIMARY) HYPERTENSION   





(3) Dementia


Code(s): F03.90 - UNSPECIFIED DEMENTIA WITHOUT BEHAVIORAL DISTURBANCE   





(4) Hypothyroid


Code(s): E03.9 - HYPOTHYROIDISM, UNSPECIFIED   





(5) Hyperlipemia


Code(s): E78.5 - HYPERLIPIDEMIA, UNSPECIFIED   





(6) Breast CA


Code(s): C50.919 - MALIGNANT NEOPLASM OF UNSP SITE OF UNSPECIFIED FEMALE BREAST

## 2019-06-16 LAB
ANION GAP SERPL CALC-SCNC: 10 MMOL/L (ref 8–16)
BASOPHILS # BLD: 1.3 % (ref 0–2)
BUN SERPL-MCNC: 19.3 MG/DL (ref 7–18)
CALCIUM SERPL-MCNC: 8.8 MG/DL (ref 8.5–10.1)
CHLORIDE SERPL-SCNC: 106 MMOL/L (ref 98–107)
CO2 SERPL-SCNC: 25 MMOL/L (ref 21–32)
CREAT SERPL-MCNC: 0.8 MG/DL (ref 0.55–1.3)
DEPRECATED RDW RBC AUTO: 12.9 % (ref 11.6–15.6)
EOSINOPHIL # BLD: 3.1 % (ref 0–4.5)
GLUCOSE SERPL-MCNC: 74 MG/DL (ref 74–106)
HCT VFR BLD CALC: 33.3 % (ref 32.4–45.2)
HGB BLD-MCNC: 11.2 GM/DL (ref 10.7–15.3)
LYMPHOCYTES # BLD: 17.5 % (ref 8–40)
MAGNESIUM SERPL-MCNC: 2 MG/DL (ref 1.8–2.4)
MCH RBC QN AUTO: 30.4 PG (ref 25.7–33.7)
MCHC RBC AUTO-ENTMCNC: 33.7 G/DL (ref 32–36)
MCV RBC: 90.3 FL (ref 80–96)
MONOCYTES # BLD AUTO: 9.3 % (ref 3.8–10.2)
NEUTROPHILS # BLD: 68.8 % (ref 42.8–82.8)
PHOSPHATE SERPL-MCNC: 4.5 MG/DL (ref 2.5–4.9)
PLATELET # BLD AUTO: 380 K/MM3 (ref 134–434)
PMV BLD: 7.5 FL (ref 7.5–11.1)
POTASSIUM SERPLBLD-SCNC: 3.7 MMOL/L (ref 3.5–5.1)
RBC # BLD AUTO: 3.69 M/MM3 (ref 3.6–5.2)
SODIUM SERPL-SCNC: 141 MMOL/L (ref 136–145)
WBC # BLD AUTO: 7.4 K/MM3 (ref 4–10)

## 2019-06-16 RX ADMIN — ASPIRIN 81 MG SCH MG: 81 TABLET ORAL at 10:17

## 2019-06-16 RX ADMIN — LORATADINE SCH MG: 10 TABLET ORAL at 10:17

## 2019-06-16 RX ADMIN — MEMANTINE SCH MG: 10 TABLET ORAL at 10:19

## 2019-06-16 RX ADMIN — ACETAMINOPHEN SCH MG: 325 TABLET ORAL at 22:10

## 2019-06-16 RX ADMIN — RANITIDINE SCH MG: 150 TABLET ORAL at 10:17

## 2019-06-16 RX ADMIN — THIAMINE HYDROCHLORIDE SCH MG: 100 INJECTION, SOLUTION INTRAMUSCULAR; INTRAVENOUS at 14:22

## 2019-06-16 RX ADMIN — THIAMINE HYDROCHLORIDE SCH MG: 100 INJECTION, SOLUTION INTRAMUSCULAR; INTRAVENOUS at 22:10

## 2019-06-16 RX ADMIN — RANITIDINE SCH MG: 150 TABLET ORAL at 22:11

## 2019-06-16 RX ADMIN — LOSARTAN POTASSIUM SCH MG: 50 TABLET, FILM COATED ORAL at 10:17

## 2019-06-16 RX ADMIN — THIAMINE HYDROCHLORIDE SCH MG: 100 INJECTION, SOLUTION INTRAMUSCULAR; INTRAVENOUS at 05:44

## 2019-06-16 RX ADMIN — LEVOTHYROXINE SODIUM SCH MCG: 88 TABLET ORAL at 06:27

## 2019-06-16 RX ADMIN — Medication SCH ML: at 17:22

## 2019-06-16 RX ADMIN — LETROZOLE SCH MG: 2.5 TABLET, FILM COATED ORAL at 10:18

## 2019-06-16 RX ADMIN — Medication SCH ML: at 07:55

## 2019-06-16 RX ADMIN — ACETAMINOPHEN SCH: 325 TABLET ORAL at 14:22

## 2019-06-16 RX ADMIN — DONEPEZIL HYDROCHLORIDE SCH MG: 10 TABLET, FILM COATED ORAL at 22:11

## 2019-06-16 RX ADMIN — ACETAMINOPHEN SCH MG: 325 TABLET ORAL at 06:27

## 2019-06-16 NOTE — PN
Progress Note (short form)





- Note


Progress Note: 


s: no chest pain, palps, dizziness, dyspnea


 Current Medications





Acetaminophen (Tylenol -)  650 mg PO TID [facility]


   Last Admin: 06/16/19 06:27 Dose:  650 mg


Amino Acids (Prosource No Carb Liquid Pkt)  30 ml PO BID@0800,1730 [facility]


   Last Admin: 06/16/19 07:55 Dose:  30 ml


Aspirin (Asa -)  81 mg PO DAILY [facility]


   Last Admin: 06/16/19 10:17 Dose:  81 mg


Bupropion HCl (Wellbutrin Xl -)  150 mg PO DAILY [facility]


   Last Admin: 06/16/19 10:17 Dose:  150 mg


Letrozole (Femara -)  2.5 mg PO DAILY [facility]


   Last Admin: 06/16/19 10:18 Dose:  2.5 mg


Levothyroxine Sodium (Synthroid -)  88 mcg PO DAILY@0700 [facility]


   Last Admin: 06/16/19 06:27 Dose:  88 mcg


Loratadine (Claritin -)  10 mg PO DAILY [facility]


   Last Admin: 06/16/19 10:17 Dose:  10 mg


Losartan Potassium (Cozaar -)  100 mg PO DAILY [facility]


   Last Admin: 06/16/19 10:17 Dose:  100 mg


Memantine (Namenda -)  10 mg PO DAILY [facility]


   Last Admin: 06/16/19 10:19 Dose:  10 mg


Non-Formulary Medication (Donepezil Hcl [Donepezil Hcl])  10 mg PO DAILY [facility]


Ranitidine HCl (Zantac -)  150 mg PO BID [facility]


   Last Admin: 06/16/19 10:17 Dose:  150 mg


Thiamine HCl (Vitamin B1 Injection -)  200 mg IVPB TID [facility]


   Stop: 06/18/19 06:01


   Last Admin: 06/16/19 05:44 Dose:  200 mg








  Vital Signs











 Period  Temp  Pulse  Resp  BP Sys/Sorensen  Pulse Ox


 


 Last 24 Hr  97.8 F-98.2 F  60-84  16-20  117-151/65-76  97-97














Constitutional: Yes: No Distress, Calm


Cardiovascular: Yes: Regular Rate and Rhythm


Respiratory: Yes: CTA Bilaterally


Gastrointestinal: Yes: Soft


Edema: No


Neurological: Yes: Alert, Oriented


...Motor Strength: WNL


no jaundice, diaphoresis


not agitated





- ....Imaging


EKG: Image Reviewed (NSR on tele)





Assessment/Plan





EKG sinus, LBBB





CXR: no acute process





tele: sinus





Syncope


- patient unable to give further hx due to dementia


- may be 2/2 dehydration given poor PO intake


- monitoring on tele - benign findings, can dc


- echo nl LV function, no significant valvular pathology, carotid mild plaque, 

no sig stenosis


- neuro consulted





abnormal EKG, LBBB


- no prior EKG for comparison


- no chest pain, trop neg x 1


- echo ordered





HTN


- cont home meds





HLD


- cont statin





hypothyroidism


- TSH 0.03 - manage per primary





stable from cardiac perspective

## 2019-06-16 NOTE — PN
Progress Note (short form)





- Note


Progress Note: 





83 y/o female found resting in bed. 


Appears comfortable. 


Denies pain and discomfort.


oriented to place and person 


discussed with Neurology 


due to extensive hx of ETOH trial of Thiamine IV started 


will complete 3 days 


continue to monitor neuro status 





 Vital Signs











 Period  Temp  Pulse  Resp  BP Sys/Sorensen  Pulse Ox


 


 Last 24 Hr  97.8 F-98.2 F  60-84  16-20  117-151/65-76  97-97

















HEENT- NL


Neck- supple


Lungs- CTAB


Heart- S1/S2


Abd- Soft, NT


Ext- No LE edema





 CBC, BMP





 06/16/19 06:00 





 06/16/19 06:00 











 CBC, BMP





 06/15/19 06:10 





 06/15/19 06:10 





telemetry  - no arhythmia 





Active Medications





Acetaminophen (Tylenol -)  650 mg PO TID [facility]


   Last Admin: 06/16/19 06:27 Dose:  650 mg


Amino Acids (Prosource No Carb Liquid Pkt)  30 ml PO BID@0800,1730 [facility]


   Last Admin: 06/16/19 07:55 Dose:  30 ml


Aspirin (Asa -)  81 mg PO DAILY [facility]


   Last Admin: 06/16/19 10:17 Dose:  81 mg


Bupropion HCl (Wellbutrin Xl -)  150 mg PO DAILY [facility]


   Last Admin: 06/16/19 10:17 Dose:  150 mg


Donepezil HCl (Aricept -)  10 mg PO [facility]


Letrozole (Femara -)  2.5 mg PO DAILY [facility]


   Last Admin: 06/16/19 10:18 Dose:  2.5 mg


Levothyroxine Sodium (Synthroid -)  88 mcg PO DAILY@0700 [facility]


   Last Admin: 06/16/19 06:27 Dose:  88 mcg


Loratadine (Claritin -)  10 mg PO DAILY [facility]


   Last Admin: 06/16/19 10:17 Dose:  10 mg


Losartan Potassium (Cozaar -)  100 mg PO DAILY [facility]


   Last Admin: 06/16/19 10:17 Dose:  100 mg


Memantine (Namenda -)  10 mg PO DAILY [facility]


   Last Admin: 06/16/19 10:19 Dose:  10 mg


Ranitidine HCl (Zantac -)  150 mg PO BID [facility]


   Last Admin: 06/16/19 10:17 Dose:  150 mg


Thiamine HCl (Vitamin B1 Injection -)  200 mg IVPB TID [facility]


   Stop: 06/18/19 06:01


   Last Admin: 06/16/19 05:44 Dose:  200 mg








#Syncope


     Cardio consult appreciated


     EKG reviewed- Lt and rt Ventricular systolic fxn nl


                EF 60-65%


     No sig aortic valvular stenosis or pericardial effusion


     no significant ectopy on telemetry 


     Neuro consult noted and appreciated 


     will decrease Donepezil dose 


     started on thiamine IV  to complete 3 days 


     no arrhythmia  will d/c telemetry 





#HTN


    BP controlled 


    Cont Arb





#Hypoalbuminemia


    Prosource ordered





#Dementia


   Behavior stable


   Cont Namenda 10 BID and Donepezil 10 q am 





#Hypothyroidism


   Levothyroxine decreased to 88 mcg


   follow TSH /T4 as out patient 





#HLD


   Chol/fat controlled diet 





#Breast Ca


   Cont Letrozole








Problem List





- Problems


(1) Syncope


Code(s): R55 - SYNCOPE AND COLLAPSE   





(2) HTN (hypertension)


Code(s): I10 - ESSENTIAL (PRIMARY) HYPERTENSION   





(3) Dementia


Code(s): F03.90 - UNSPECIFIED DEMENTIA WITHOUT BEHAVIORAL DISTURBANCE   





(4) Hypothyroid


Code(s): E03.9 - HYPOTHYROIDISM, UNSPECIFIED   





(5) Hyperlipemia


Code(s): E78.5 - HYPERLIPIDEMIA, UNSPECIFIED   





(6) Breast CA


Code(s): C50.919 - MALIGNANT NEOPLASM OF UNSP SITE OF UNSPECIFIED FEMALE BREAST

## 2019-06-17 RX ADMIN — LEVOTHYROXINE SODIUM SCH MCG: 88 TABLET ORAL at 06:01

## 2019-06-17 RX ADMIN — THIAMINE HYDROCHLORIDE SCH MG: 100 INJECTION, SOLUTION INTRAMUSCULAR; INTRAVENOUS at 05:30

## 2019-06-17 RX ADMIN — RANITIDINE SCH MG: 150 TABLET ORAL at 09:18

## 2019-06-17 RX ADMIN — Medication SCH: at 09:18

## 2019-06-17 RX ADMIN — THIAMINE HYDROCHLORIDE SCH MG: 100 INJECTION, SOLUTION INTRAMUSCULAR; INTRAVENOUS at 14:00

## 2019-06-17 RX ADMIN — THIAMINE HYDROCHLORIDE SCH MG: 100 INJECTION, SOLUTION INTRAMUSCULAR; INTRAVENOUS at 21:15

## 2019-06-17 RX ADMIN — ACETAMINOPHEN SCH: 325 TABLET ORAL at 06:00

## 2019-06-17 RX ADMIN — Medication SCH: at 17:53

## 2019-06-17 RX ADMIN — LORATADINE SCH MG: 10 TABLET ORAL at 09:18

## 2019-06-17 RX ADMIN — ACETAMINOPHEN SCH: 325 TABLET ORAL at 14:00

## 2019-06-17 RX ADMIN — RANITIDINE SCH MG: 150 TABLET ORAL at 21:13

## 2019-06-17 RX ADMIN — LETROZOLE SCH MG: 2.5 TABLET, FILM COATED ORAL at 09:19

## 2019-06-17 RX ADMIN — LOSARTAN POTASSIUM SCH MG: 50 TABLET, FILM COATED ORAL at 09:18

## 2019-06-17 RX ADMIN — MEMANTINE SCH MG: 10 TABLET ORAL at 09:18

## 2019-06-17 RX ADMIN — ASPIRIN 81 MG SCH MG: 81 TABLET ORAL at 09:18

## 2019-06-17 RX ADMIN — DONEPEZIL HYDROCHLORIDE SCH MG: 10 TABLET, FILM COATED ORAL at 21:13

## 2019-06-17 RX ADMIN — ACETAMINOPHEN SCH MG: 325 TABLET ORAL at 21:14

## 2019-06-17 NOTE — PN
Progress Note, Physician


Chief Complaint: 





syncope


History of Present Illness: 





feels in usoh.


denies dizzy/LH.


no cp, sob, palpitations.





standing, ambulating freely





- Current Medication List


Current Medications: 


Active Medications





Acetaminophen (Tylenol -)  650 mg PO TID [facility]


   Last Admin: 06/17/19 06:00 Dose:  Not Given


Amino Acids (Prosource No Carb Liquid Pkt)  30 ml PO BID@0800,1730 [facility]


   Last Admin: 06/17/19 09:18 Dose:  Not Given


Aspirin (Asa -)  81 mg PO DAILY [facility]


   Last Admin: 06/17/19 09:18 Dose:  81 mg


Bupropion HCl (Wellbutrin Xl -)  150 mg PO DAILY [facility]


   Last Admin: 06/17/19 09:18 Dose:  150 mg


Donepezil HCl (Aricept -)  10 mg PO HS [facility]


   Last Admin: 06/16/19 22:11 Dose:  10 mg


Letrozole (Femara -)  2.5 mg PO DAILY [facility]


   Last Admin: 06/17/19 09:19 Dose:  2.5 mg


Levothyroxine Sodium (Synthroid -)  88 mcg PO DAILY@0700 [facility]


   Last Admin: 06/17/19 06:01 Dose:  88 mcg


Loratadine (Claritin -)  10 mg PO DAILY [facility]


   Last Admin: 06/17/19 09:18 Dose:  10 mg


Losartan Potassium (Cozaar -)  100 mg PO DAILY [facility]


   Last Admin: 06/17/19 09:18 Dose:  100 mg


Memantine (Namenda -)  10 mg PO DAILY [facility]


   Last Admin: 06/17/19 09:18 Dose:  10 mg


Ranitidine HCl (Zantac -)  150 mg PO BID [facility]


   Last Admin: 06/17/19 09:18 Dose:  150 mg


Thiamine HCl (Vitamin B1 Injection -)  200 mg IVPB TID [facility]


   Stop: 06/18/19 06:01


   Last Admin: 06/17/19 05:30 Dose:  200 mg











- Objective


Vital Signs: 


 Vital Signs











Temperature  98.1 F   06/17/19 09:00


 


Pulse Rate  65   06/17/19 09:00


 


Respiratory Rate  18   06/17/19 09:00


 


Blood Pressure  128/65   06/17/19 09:00


 


O2 Sat by Pulse Oximetry (%)  93 L  06/16/19 21:00











Constitutional: Yes: Well Nourished, No Distress, Calm


Cardiovascular: Yes: Regular Rate and Rhythm, S1, S2.  No: Gallop, Murmur


Respiratory: Yes: Regular, CTA Bilaterally.  No: Accessory Muscle Use, Rales, 

Wheezes


Extremities: No: Cold


Edema: No


Neurological: Yes: Alert.  No: Seizure


Psychiatric: No: Agitated


Labs: 


 CBC, BMP





 06/16/19 06:00 





 06/16/19 06:00 











Assessment/Plan





Echo: nl LVSF. nl RV. valves unremarkable





tele: NSR











Syncope


- patient unable to give further hx due to dementia


- may be 2/2 dehydration given poor PO intake


- tele benign here


- echo nl LV function, no significant valvular pathology. 


- check orthostatic BPs--rec.s to follow based on findings


- no further inpt cardio w/u or mgmt is needed.





abnormal EKG, LBBB


- no prior EKG for comparison


- no chest pain, trop neg x 1


- echo ordered





HTN


- bp stable


- cont losartan





HLD


- cont statin





hypothyroidism


- TSH 0.03 - manage per primary

## 2019-06-17 NOTE — PN
Progress Note (short form)





- Note


Progress Note: 





85 y/o female found resting in bed. 


Appears comfortable. 


Denies pain and discomfort.


oriented to place and person 





POC discussed with SON today 


aware of poor PO intake - a chronic problem 


 Vital Signs











 Period  Temp  Pulse  Resp  BP Sys/Sorensen  Pulse Ox


 


 Last 24 Hr  97.9 F-99 F  57-89  17-18  126-134/65-78  93-93




















HEENT- NL


Neck- supple


Lungs- CTAB


Heart- S1/S2


Abd- Soft, NT


Ext- No LE edema





 CBC, BMP





 06/16/19 06:00 





 06/16/19 06:00 











 CBC, BMP





 06/15/19 06:10 





 06/15/19 06:10 





telemetry  - no arhythmia 





Active Medications





Acetaminophen (Tylenol -)  650 mg PO TID [facility]


   Last Admin: 06/17/19 06:00 Dose:  Not Given


Amino Acids (Prosource No Carb Liquid Pkt)  30 ml PO BID@0800,1730 [facility]


   Last Admin: 06/17/19 09:18 Dose:  Not Given


Aspirin (Asa -)  81 mg PO DAILY [facility]


   Last Admin: 06/17/19 09:18 Dose:  81 mg


Bupropion HCl (Wellbutrin Xl -)  150 mg PO DAILY [facility]


   Last Admin: 06/17/19 09:18 Dose:  150 mg


Donepezil HCl (Aricept -)  10 mg PO HS [facility]


   Last Admin: 06/16/19 22:11 Dose:  10 mg


Letrozole (Femara -)  2.5 mg PO DAILY [facility]


   Last Admin: 06/17/19 09:19 Dose:  2.5 mg


Levothyroxine Sodium (Synthroid -)  88 mcg PO DAILY@0700 [facility]


   Last Admin: 06/17/19 06:01 Dose:  88 mcg


Loratadine (Claritin -)  10 mg PO DAILY [facility]


   Last Admin: 06/17/19 09:18 Dose:  10 mg


Losartan Potassium (Cozaar -)  100 mg PO DAILY [facility]


   Last Admin: 06/17/19 09:18 Dose:  100 mg


Memantine (Namenda -)  10 mg PO DAILY [facility]


   Last Admin: 06/17/19 09:18 Dose:  10 mg


Ranitidine HCl (Zantac -)  150 mg PO BID [facility]


   Last Admin: 06/17/19 09:18 Dose:  150 mg


Thiamine HCl (Vitamin B1 Injection -)  200 mg IVPB TID [facility]


   Stop: 06/18/19 06:01


   Last Admin: 06/17/19 05:30 Dose:  200 mg











#Syncope


     Cardio consult appreciated case discussed 


     EKG reviewed- Lt and rt Ventricular systolic fxn nl


                EF 60-65%


     No sig aortic valvular stenosis or pericardial effusion


     no significant ectopy on telemetry 


     Neuro consult noted and appreciated 


     decrease Donepezil dose 


     started on thiamine IV  to complete 3 days 


     no arrhythmia  will d/c telemetry 





#HTN


    BP controlled 


    Cont Arb





#Hypoalbuminemia


    Prosource ordered





#Dementia


   Behavior stable


   Cont Namenda 10 BID and Donepezil 10 q am 





#Hypothyroidism


   Levothyroxine decreased to 88 mcg


   follow TSH /T4 as out patient 





#HLD


   Chol/fat controlled diet 





#Breast Ca


   Cont Letrozole





arrangements for return to [facility] assisted living in am.





Problem List





- Problems


(1) Syncope


Code(s): R55 - SYNCOPE AND COLLAPSE   





(2) HTN (hypertension)


Code(s): I10 - ESSENTIAL (PRIMARY) HYPERTENSION   





(3) Dementia


Code(s): F03.90 - UNSPECIFIED DEMENTIA WITHOUT BEHAVIORAL DISTURBANCE   





(4) Hypothyroid


Code(s): E03.9 - HYPOTHYROIDISM, UNSPECIFIED   





(5) Hyperlipemia


Code(s): E78.5 - HYPERLIPIDEMIA, UNSPECIFIED   





(6) Breast CA


Code(s): C50.919 - MALIGNANT NEOPLASM OF UNSP SITE OF UNSPECIFIED FEMALE BREAST

## 2019-06-18 VITALS — DIASTOLIC BLOOD PRESSURE: 54 MMHG | HEART RATE: 62 BPM | TEMPERATURE: 97.8 F | SYSTOLIC BLOOD PRESSURE: 114 MMHG

## 2019-06-18 LAB
ANION GAP SERPL CALC-SCNC: 8 MMOL/L (ref 8–16)
BASOPHILS # BLD: 1.3 % (ref 0–2)
BUN SERPL-MCNC: 13.5 MG/DL (ref 7–18)
CALCIUM SERPL-MCNC: 9.2 MG/DL (ref 8.5–10.1)
CHLORIDE SERPL-SCNC: 107 MMOL/L (ref 98–107)
CO2 SERPL-SCNC: 28 MMOL/L (ref 21–32)
CREAT SERPL-MCNC: 0.9 MG/DL (ref 0.55–1.3)
DEPRECATED RDW RBC AUTO: 12.8 % (ref 11.6–15.6)
EOSINOPHIL # BLD: 5.1 % (ref 0–4.5)
GLUCOSE SERPL-MCNC: 94 MG/DL (ref 74–106)
HCT VFR BLD CALC: 32.6 % (ref 32.4–45.2)
HGB BLD-MCNC: 11 GM/DL (ref 10.7–15.3)
LYMPHOCYTES # BLD: 17.3 % (ref 8–40)
MCH RBC QN AUTO: 30.4 PG (ref 25.7–33.7)
MCHC RBC AUTO-ENTMCNC: 33.6 G/DL (ref 32–36)
MCV RBC: 90.5 FL (ref 80–96)
MONOCYTES # BLD AUTO: 10.4 % (ref 3.8–10.2)
NEUTROPHILS # BLD: 65.9 % (ref 42.8–82.8)
PLATELET # BLD AUTO: 381 K/MM3 (ref 134–434)
PMV BLD: 7.4 FL (ref 7.5–11.1)
POTASSIUM SERPLBLD-SCNC: 3.9 MMOL/L (ref 3.5–5.1)
RBC # BLD AUTO: 3.61 M/MM3 (ref 3.6–5.2)
SODIUM SERPL-SCNC: 142 MMOL/L (ref 136–145)
WBC # BLD AUTO: 6.8 K/MM3 (ref 4–10)

## 2019-06-18 RX ADMIN — LOSARTAN POTASSIUM SCH MG: 50 TABLET, FILM COATED ORAL at 09:53

## 2019-06-18 RX ADMIN — ASPIRIN 81 MG SCH MG: 81 TABLET ORAL at 09:53

## 2019-06-18 RX ADMIN — Medication SCH: at 09:52

## 2019-06-18 RX ADMIN — LEVOTHYROXINE SODIUM SCH MCG: 88 TABLET ORAL at 06:05

## 2019-06-18 RX ADMIN — THIAMINE HYDROCHLORIDE SCH MG: 100 INJECTION, SOLUTION INTRAMUSCULAR; INTRAVENOUS at 06:05

## 2019-06-18 RX ADMIN — RANITIDINE SCH MG: 150 TABLET ORAL at 09:55

## 2019-06-18 RX ADMIN — MEMANTINE SCH MG: 10 TABLET ORAL at 09:54

## 2019-06-18 RX ADMIN — ACETAMINOPHEN SCH MG: 325 TABLET ORAL at 06:06

## 2019-06-18 RX ADMIN — LORATADINE SCH MG: 10 TABLET ORAL at 09:53

## 2019-06-18 RX ADMIN — LETROZOLE SCH MG: 2.5 TABLET, FILM COATED ORAL at 10:05

## 2019-06-18 NOTE — DS
Physical Examination


Vital Signs: 


 Vital Signs











Temperature  97.7 F   06/18/19 05:00


 


Pulse Rate  63   06/18/19 05:00


 


Respiratory Rate  20   06/18/19 05:00


 


Blood Pressure  141/61   06/18/19 05:00


 


O2 Sat by Pulse Oximetry (%)  96   06/17/19 21:00











Findings/Remarks: 








The patient is an 84 year old female, with a significant past medical history 

of HTN, HLD, Hypothyroidism, dementia, chronic back pain, Breast cancer 5 yrs 

ago s/p Right lumpectomy, Anxiety, Arthritis, who presents to the emergency 

department s/p syncope. As per [facility] records and son at bedside, the patient has 

not been eating much the past few days prior to the onset of her symptoms. As 

per son, she has experienced approx. 3 times over the past 2 years at which 

time she was placed on medications by her psychiatrist to increase her appetite 

(recently dosage was lowered of this medication). History was primarily 

obtained via Residence and son  secondary to patients mild dementia. 








#Syncope


     Cardio consult appreciated case discussed 


     EKG reviewed- Lt and rt Ventricular systolic fxn nl


                EF 60-65%


     No sig aortic valvular stenosis or pericardial effusion


     no significant ectopy on telemetry 


     Neuro consult noted and appreciated 


     decrease Donepezil dose 


     started on thiamine IV  to complete 3 days 


     no arrhythmia on  telemetry 





#HTN


    BP controlled 


    Cont Arb





#Hypoalbuminemia


    Prosource ordered





#Dementia


   Behavior stable


   Cont Namenda 10 BID and Donepezil 10 q am 





#Hypothyroidism


   Levothyroxine decreased to 88 mcg


   follow TSH /T4 as out patient 





#HLD


   Chol/fat controlled diet 





#Breast Ca


   Cont Letrozole





arrangements for return to [facility] assisted living in am.





Constitutional: Yes: Well Nourished, No Distress, Calm


Eyes: Yes: Conjunctiva Clear, EOM Intact


HENT: Yes: Atraumatic, Normocephalic


Neck: Yes: Supple, Trachea Midline


Cardiovascular: Yes: Regular Rate and Rhythm


Respiratory: Yes: Regular, CTA Bilaterally


Gastrointestinal: Yes: Normal Bowel Sounds, Soft


...Rectal Exam: Yes: Deferred


Renal/: Yes: WNL


Breast(s): Yes: WNL


Musculoskeletal: Yes: WNL


Extremities: Yes: WNL.  No: Cyanosis, Deformity


Edema: No


Peripheral Pulses WNL: Yes


Integumentary: Yes: WNL


Neurological: Yes: Pre-Existing Deficit


Psychiatric: Yes: Alert, Oriented (X1)


Labs: 


 CBC, BMP





 06/18/19 06:15 





 06/18/19 06:15 











Discharge Summary


Reason For Visit: SYNCOPE


Current Active Problems





Breast CA (Acute)


Dementia (Acute)


HTN (hypertension) (Acute)


Hyperlipemia (Acute)


Hypothyroid (Acute)


Syncope (Acute)








Condition: Improved





- Instructions


Disposition: HOME





- Home Medications


Comprehensive Discharge Medication List: 


Ambulatory Orders





Acetaminophen 650 mg PO TID 06/13/19 


Aspirin 81 mg PO DAILY 06/13/19 


Bupropion HCl [Bupropion Xl] 150 mg PO DAILY 06/13/19 


Cyanocobalamin (Vitamin B-12) [Vitamin B-12] 1,000 mcg SL DAILY 06/13/19 


Donepezil HCl 10mg PO DAILY 06/13/19 


Letrozole [Femara -] 2.5 mg PO DAILY 06/13/19 


Levothyroxine [Synthroid -] 88 mcg PO DAILY 06/13/19 


Loratadine 10 mg PO DAILY 06/13/19 


Losartan Potassium 100 mg PO DAILY 06/13/19 


Memantine HCl [Memantine HCl ER] 28 mg PO DAILY 06/13/19 


Ranitidine HCl 150 mg PO BID 06/13/19 


Vitamin D3 - 50,000 units PO WEEKLY 06/13/19

## 2019-06-18 NOTE — PN
Progress Note (short form)





- Note


Progress Note: 


s: no chest pain, palps, dizziness, lightheadedness


 Current Medications





Acetaminophen (Tylenol -)  650 mg PO TID [facility]


   Last Admin: 06/18/19 06:06 Dose:  650 mg


Amino Acids (Prosource No Carb Liquid Pkt)  30 ml PO BID@0800,1730 [facility]


   Last Admin: 06/18/19 09:52 Dose:  Not Given


Aspirin (Asa -)  81 mg PO DAILY [facility]


   Last Admin: 06/18/19 09:53 Dose:  81 mg


Bupropion HCl (Wellbutrin Xl -)  150 mg PO DAILY [facility]


   Last Admin: 06/18/19 09:55 Dose:  150 mg


Donepezil HCl (Aricept -)  10 mg PO HS [facility]


   Last Admin: 06/17/19 21:13 Dose:  10 mg


Letrozole (Femara -)  2.5 mg PO DAILY [facility]


   Last Admin: 06/18/19 10:05 Dose:  2.5 mg


Levothyroxine Sodium (Synthroid -)  88 mcg PO DAILY@0700 [facility]


   Last Admin: 06/18/19 06:05 Dose:  88 mcg


Loratadine (Claritin -)  10 mg PO DAILY [facility]


   Last Admin: 06/18/19 09:53 Dose:  10 mg


Losartan Potassium (Cozaar -)  100 mg PO DAILY [facility]


   Last Admin: 06/18/19 09:53 Dose:  100 mg


Memantine (Namenda -)  10 mg PO DAILY [facility]


   Last Admin: 06/18/19 09:54 Dose:  10 mg


Ranitidine HCl (Zantac -)  150 mg PO BID [facility]


   Last Admin: 06/18/19 09:55 Dose:  150 mg





 Vital Signs











 Period  Temp  Pulse  Resp  BP Sys/Sorensen  Pulse Ox


 


 Last 24 Hr  97.7 F-98.4 F  62-88  18-20  114-156/54-75  96














Constitutional: Yes: Well Nourished, No Distress, Calm


Cardiovascular: Yes: Regular Rate and Rhythm, S1, S2.  No: Gallop, Murmur


Respiratory: Yes: Regular, CTA Bilaterally.  No: Accessory Muscle Use, Rales, 

Wheezes


Extremities: No: Cold


Edema: No


Neurological: Yes: Alert.  No: Seizure


Psychiatric: No: Agitated





Assessment/Plan





Echo: nl LVSF. nl RV. valves unremarkable





tele: NSR








Syncope


- patient unable to give further hx due to dementia


- may be 2/2 dehydration given poor PO intake


- tele benign here


- echo nl LV function, no significant valvular pathology. 


- orthostatics neg


- no further inpt cardio w/u or mgmt is needed.





abnormal EKG, LBBB


- no prior EKG for comparison


- no chest pain, trop neg x 1


- echo ordered





HTN


- bp stable


- cont losartan





HLD


- cont statin





hypothyroidism


- TSH 0.03 - manage per primary

## 2019-07-01 ENCOUNTER — HOSPITAL ENCOUNTER (EMERGENCY)
Dept: HOSPITAL 74 - JER | Age: 84
LOS: 1 days | Discharge: NURSING, REHAB OR LONG-TERM CARE | End: 2019-07-02
Payer: COMMERCIAL

## 2019-07-01 VITALS — SYSTOLIC BLOOD PRESSURE: 142 MMHG | HEART RATE: 69 BPM | TEMPERATURE: 98.4 F | DIASTOLIC BLOOD PRESSURE: 69 MMHG

## 2019-07-01 VITALS — BODY MASS INDEX: 20.5 KG/M2

## 2019-07-01 DIAGNOSIS — I10: ICD-10-CM

## 2019-07-01 DIAGNOSIS — Z85.3: ICD-10-CM

## 2019-07-01 DIAGNOSIS — R63.0: Primary | ICD-10-CM

## 2019-07-01 DIAGNOSIS — E03.9: ICD-10-CM

## 2019-07-01 DIAGNOSIS — F41.9: ICD-10-CM

## 2019-07-01 DIAGNOSIS — E78.00: ICD-10-CM

## 2019-07-01 DIAGNOSIS — F03.90: ICD-10-CM

## 2019-07-01 LAB
ALBUMIN SERPL-MCNC: 3.2 G/DL (ref 3.4–5)
ALP SERPL-CCNC: 99 U/L (ref 45–117)
ALT SERPL-CCNC: 13 U/L (ref 13–61)
ANION GAP SERPL CALC-SCNC: 4 MMOL/L (ref 8–16)
APPEARANCE UR: CLEAR
AST SERPL-CCNC: 15 U/L (ref 15–37)
BACTERIA # UR AUTO: 19.3 /HPF
BASOPHILS # BLD: 0.3 % (ref 0–2)
BILIRUB SERPL-MCNC: 0.2 MG/DL (ref 0.2–1)
BILIRUB UR STRIP.AUTO-MCNC: NEGATIVE MG/DL
BUN SERPL-MCNC: 14.1 MG/DL (ref 7–18)
CALCIUM SERPL-MCNC: 9.3 MG/DL (ref 8.5–10.1)
CASTS URNS QL MICRO: 0 /LPF (ref 0–8)
CHLORIDE SERPL-SCNC: 104 MMOL/L (ref 98–107)
CO2 SERPL-SCNC: 28 MMOL/L (ref 21–32)
COLOR UR: YELLOW
CREAT SERPL-MCNC: 1.1 MG/DL (ref 0.55–1.3)
DEPRECATED RDW RBC AUTO: 13.3 % (ref 11.6–15.6)
EOSINOPHIL # BLD: 3.1 % (ref 0–4.5)
EPITH CASTS URNS QL MICRO: 2.3 /HPF
GLUCOSE SERPL-MCNC: 86 MG/DL (ref 74–106)
HCT VFR BLD CALC: 36.5 % (ref 32.4–45.2)
HGB BLD-MCNC: 12.2 GM/DL (ref 10.7–15.3)
KETONES UR QL STRIP: NEGATIVE
LEUKOCYTE ESTERASE UR QL STRIP.AUTO: (no result)
LYMPHOCYTES # BLD: 19.9 % (ref 8–40)
MAGNESIUM SERPL-MCNC: 2.3 MG/DL (ref 1.8–2.4)
MCH RBC QN AUTO: 30.4 PG (ref 25.7–33.7)
MCHC RBC AUTO-ENTMCNC: 33.5 G/DL (ref 32–36)
MCV RBC: 90.7 FL (ref 80–96)
MONOCYTES # BLD AUTO: 8.9 % (ref 3.8–10.2)
NEUTROPHILS # BLD: 67.8 % (ref 42.8–82.8)
NITRITE UR QL STRIP: NEGATIVE
PH UR: 6 [PH] (ref 5–8)
PHOSPHATE SERPL-MCNC: 3.7 MG/DL (ref 2.5–4.9)
PLATELET # BLD AUTO: 442 K/MM3 (ref 134–434)
PMV BLD: 7.1 FL (ref 7.5–11.1)
POTASSIUM SERPLBLD-SCNC: 4.3 MMOL/L (ref 3.5–5.1)
PROT SERPL-MCNC: 6.6 G/DL (ref 6.4–8.2)
PROT UR QL STRIP: NEGATIVE
PROT UR QL STRIP: NEGATIVE
RBC # BLD AUTO: 1 /HPF (ref 0–4)
RBC # BLD AUTO: 4.03 M/MM3 (ref 3.6–5.2)
SODIUM SERPL-SCNC: 136 MMOL/L (ref 136–145)
SP GR UR: 1.01 (ref 1.01–1.03)
UROBILINOGEN UR STRIP-MCNC: 0.2 MG/DL (ref 0.2–1)
WBC # BLD AUTO: 8.3 K/MM3 (ref 4–10)
WBC # UR AUTO: 3 /HPF (ref 0–5)

## 2019-07-01 PROCEDURE — 3E0337Z INTRODUCTION OF ELECTROLYTIC AND WATER BALANCE SUBSTANCE INTO PERIPHERAL VEIN, PERCUTANEOUS APPROACH: ICD-10-PCS

## 2019-07-01 NOTE — PDOC
History of Present Illness





- General


Stated Complaint: FAILURE TO THRIVE


Time Seen by Provider: 07/01/19 20:45


History Source: Patient, Family (Son)


Exam Limitations: Dementia





- History of Present Illness


Initial Comments: 


Pt is an 83 yo F, with PMH of LBBB, hypothyroidism, HTN, HLD, dementia, and 

breat CA (s/p lumpectomy and on letrozole), who is presenting from [facility] for concern of decreased appetite, "weakness and dehydration" per 

nursing home records. Pt is accompanied by her son, who states the pt was 

hospitalized in the past for poor appetite, but does not think the pt is 

altered or confused from her baseline. The son also states he took the pt to 

lunch yesterday, and she did tolerate some PO intake. Pt states "I just don't 

feel like eating" and denies any SI or HI. Pt denies any fevers/chills, headache

, vision changes, dysphagia or odonyphagia, syncope, chest pain, palpitations, 

SOB, nausea/vomiting, abdominal pain, urinary symptoms, diarrhea/constipation, 

or leg swelling.





Allergies: cefuroxime (extensive skin rash, agitation)


PCP: Dr. Mayer/Reba





Social: Pt denies any cigarette, alcohol, or drug use.


Pt denies any recent travel or sick contacts.


Surgical: no relevant history.


Family: no relevant history.


07/01/19 23:26








Past History





- Travel


Traveled outside of the country in the last 30 days: No


Close contact w/someone who was outside of country & ill: No





- Past Medical History


Allergies/Adverse Reactions: 


 Allergies











Allergy/AdvReac Type Severity Reaction Status Date / Time


 


cefuroxime [From Ceftin] Allergy   Verified 07/01/19 20:57


 


hydrocodone Allergy   Verified 07/01/19 20:57


 


metoprolol Allergy   Verified 07/01/19 20:57











Home Medications: 


Ambulatory Orders





Aspirin 81 mg PO DAILY 06/13/19 


Bupropion HCl [Bupropion Xl] 150 mg PO DAILY 06/13/19 


Cyanocobalamin (Vitamin B-12) [Vitamin B-12] 1,000 mcg SL DAILY 06/13/19 


Letrozole [Femara -] 2.5 mg PO DAILY 06/13/19 


Loratadine 10 mg PO DAILY 06/13/19 


Losartan Potassium 100 mg PO DAILY 06/13/19 


Memantine HCl [Memantine HCl ER] 28 mg PO DAILY 06/13/19 


Ranitidine HCl 150 mg PO BID 06/13/19 


Donepezil HCl [Aricept -] 10 mg PO HS 30 Days #30 tablet 06/18/19 


Levothyroxine [Synthroid -] 88 mcg PO DAILY@0700 30 Days #30 tablet 06/18/19 








Cancer: Yes (breast s/p rt lumpectomy)


COPD: No


Dementia: Yes


HTN: Yes


Hypercholesterolemia: Yes


Psychiatric Problems: Yes (anxiety)


Thyroid Disease: Yes (hypothyroidism)





- Immunization History


Immunization Up to Date: No





- Suicide/Smoking/Psychosocial Hx


Smoking History: Unknown if ever smoked


Have you smoked in the past 12 months: No


Hx Alcohol Use: No


Drug/Substance Use Hx: No





**Review of Systems





- Review of Systems


Able to Perform ROS?: Yes (limited, dementia)


Is the patient limited English proficient: No


Constitutional: Yes: Loss of Appetite, Malaise, Weight Stable.  No: Fever, 

Weakness


HEENTM: No: Blurred Vision, Double Vision, Throat Pain, Throat Swelling, 

Difficulty Swallowing


Respiratory: No: Cough, Shortness of Breath


Cardiac (ROS): No: Chest Pain, Palpitations


ABD/GI: Yes: Poor Appetite.  No: Constipated, Diarrhea, Nausea, Poor Fluid 

Intake, Vomiting


: No: Burning, Dysuria, Pain


Musculoskeletal: No: Joint Pain, Joint Swelling


Integumentary: No: Rash


Neurological: No: Numbness, Weakness, Dizziness


Psychiatric: Yes: Change in Appetite.  No: Depression, Sleep Pattern Change, 

Other (pt denies SI, HI)


Endocrine: No: Change in Weight


Hematologic/Lymphatic: No: Anemia, Blood Clots, Easy Bleeding


All Other Systems: Reviewed and Negative





*Physical Exam





- Physical Exam


Comments: 





07/01/19 23:22


Vitals stable, pt afebrile. Pt in NAD, thin body habitus. 


Pt alert and oriented to person and place (pt dementia at baseline)


CNs generally intact, muscular strength and sensation intact. No cerebellar 

deficits or ataxia. Pt ambulatory in ED.


No midline spinal tenderness, step-offs, or crepitus. 


Head normocephalic, atraumatic.


Eyes PERRLA, EOMI. Mild proptosis. Oropharynx without erythema or exudates, no 

LAD b/l. 


No nasal congestion, hearing intact. 


Clear heart sounds, S1/S2, no JVD, b/l pedal edema, or heart murmur. 


Clear lung sounds, no respiratory distress, wheezes, crackles, or accessory 

muscle use. 


No abdominal or CVA tenderness to palpation, no rebound, no guarding. Abdomen 

soft, non-distended, and with normoactive bowel sounds. 


Skin without jaundice or rash. 











ED Treatment Course





- LABORATORY


CBC & Chemistry Diagram: 


 07/01/19 21:48





 07/01/19 21:48





Medical Decision Making





- Medical Decision Making


Pt was seen at bedside, also will be seen by attending Dr. Jordan. Pt 

presenting from [facility] for concern of decreased appetite, 

"weakness and dehydration" per nursing home records. Pt is accompanied by her 

son, who states the pt was hospitalized in the past for poor appetite, but does 

not think the pt is altered or confused from her baseline. The son also states 

he took the pt to lunch yesterday, and she did tolerate some PO intake. Pt 

states "I just don't feel like eating" and denies any SI or HI. Pt denies any 

fevers/chills, headache, vision changes, dysphagia or odonyphagia, syncope, 

chest pain, palpitations, SOB, nausea/vomiting, abdominal pain, urinary symptoms

, diarrhea/constipation, or leg swelling.





Ordered work-up including CBC, CMP, TFTs, ECG, chest x-ray, UA. Pt denies SI or 

HI at this time.


Provided 1 L IV NS for improvement of dehydration and will encourage pt towards 

PO intake as well.


Will continue to reassess pt and monitor for symptomatic improvement.





ECG: NSR, with PVCs, LLL (HR 72, , , QTc 501). No significant 

changes from prior ECG (June 2019).


07/01/19 23:23





CBC and CMP WNL


Hypothyroidism slowly improving, will let PCP follow.


Chest x-ray WNL. 


Pt tolerated PO intake in ED and states she is feeling improved after IVF 

hydration.


Called [facility], nursing staff aware pt is medically cleared. Son 

will drive pt back to nursing home.


Considering normal lab results and imaging, pt can be discharged to home with 

follow-up. Pt advised to follow-up with PCP and psychiatrist in 1-2 days. 

Strict return precautions provided with pt understanding.


07/01/19 23:47








*DC/Admit/Observation/Transfer


Diagnosis at time of Disposition: 


 Decreased appetite





Hypothyroid


Qualifiers:


 Hypothyroidism type: unspecified Qualified Code(s): E03.9 - Hypothyroidism, 

unspecified








- Discharge Dispostion


Disposition: SKILLED NURSING FACILITY


Condition at time of disposition: Fair


Decision to Admit order: No





- Referrals


Referrals: 


Aminata Mayer [Non Staff, Medical] - 





- Patient Instructions


Printed Discharge Instructions:  DI for Failure to Thrive, DI for Hypothyroidism


Additional Instructions: 


You were seen in the ER today for decreased appetite. The results of your labs 

and imaging today were normal. Please follow-up with your primary care doctor 

within 1-2 days to discuss your visit and make sure your symptoms have 

improved. Please return to the ER if you have any continued decreased appetite 

or weight loss, development of fevers or chills, loss of consciousness, 

inability to tolerate food or fluids, or any other concerns.





Please attempt to eat solid and liquid foods and drinks as tolerated. Please 

return to the ER or call [phone] if you have any thoughts of hurting yourself or 

others.








- Post Discharge Activity

## 2019-07-02 NOTE — EKG
Test Reason : 

Blood Pressure : ***/*** mmHG

Vent. Rate : 072 BPM     Atrial Rate : 072 BPM

   P-R Int : 164 ms          QRS Dur : 136 ms

    QT Int : 458 ms       P-R-T Axes : 063 -16 088 degrees

   QTc Int : 501 ms

 

SINUS RHYTHM WITH PREMATURE SUPRAVENTRICULAR COMPLEXES AND PREMATURE

VENTRICULAR COMPLEXES OR FUSION COMPLEXES

LEFT BUNDLE BRANCH BLOCK

ABNORMAL ECG

WHEN COMPARED WITH ECG OF 14-JUN-2019 14:54,

FUSION COMPLEXES ARE NOW PRESENT

PREMATURE VENTRICULAR COMPLEXES ARE NOW PRESENT

PREMATURE SUPRAVENTRICULAR COMPLEXES ARE NOW PRESENT

Confirmed by MD KIYA, SAMEER ([id number]) on 7/2/2019 1:54:32 PM

 

Referred By:             Confirmed By:SAMEER HUERTAS MD

## 2019-07-03 NOTE — PDOC
Documentation entered by Morgan Yoo SCRIBE, acting as scribe for Kailey Jordan MD.








Kailey Jordan MD:  This documentation has been prepared by the Fredo girard Daniel, SCRIBE, under my direction and personally reviewed by me in its 

entirety.  I confirm that the documentation accurately reflects all work, 

treatment, procedures, and medical decision making performed by me.  





Attending Attestation





- Resident


Resident Name: Sindi Anthony





- ED Attending Attestation


I have performed the following: I have examined & evaluated the patient, The 

case was reviewed & discussed with the resident, I agree w/resident's findings 

& plan, Exceptions are as noted





- HPI


HPI: 





07/01/19 22:11


The patient is an 84 year old female with a past medical history of left bundle 

branch block, HTN, HLD, dementia, breast cancer (s/p lumpectomy), 

hypothyroidism here today from [facility] for evaluation of loss of appetite. 

The patient reports having no symptoms but just does not want to eat. Denies 

any pain at all. Patient was admitted recently for syncope.


 


Patient denies headache, lightheadedness. Denies fever, chills. Denies chest 

pain, shortness of breath. Denies nausea, vomiting, diarrhea, abdominal pain. 





Allergies: cefuroxime, hydrocodone, metoprolol


PCP: Aminata Mayer








- Physicial Exam


PE: 





07/03/19 03:11


thin elderly 85 yo female sent in from [facility] facility for decreased appetite


head ncat


abd no rebound,no guarding


skin warm and dry


lungs  no wheezing,no crackles


cvs [id number]


skin warm and dry


neuro alert to person ,moving extremities


07/03/19 03:13





07/03/19 03:15








- Medical Decision Making





07/01/19 22:15


this 85 yo female brought in from  [facility] for failure 

to thrive.  However the son said they took her to a restaurant on Sunday and 

she ate. She has a hisotry of poor appetite and several times in the past 2 

years she has been placed on medication to increase her appetite


she was admitted 2 weeks ago for poor appetite and syncope


PMH  dementia, syncope,arthritis, breast cancer ,hypothyroidism


PSH   lumpectomy


07/01/19 22:21





07/03/19 03:16


labs unremarkable


pt has h/o poor appetite and has been followed by her PCP for this


pt d/c back to [facility]

## 2019-10-17 NOTE — ED ADULT NURSE NOTE - NS ED NURSE LEVEL OF CONSCIOUSNESS AFFECT
Patient complete sequential screen today.  [facility] sonogram scheduled for November 7, 2019    
[place]

## 2019-10-23 NOTE — H&P PST ADULT - NEGATIVE LYMPHATIC SYMPTOMS
Detail Level: Detailed
Quality 402: Tobacco Use And Help With Quitting Among Adolescents: Patient screened for tobacco and never smoked
no enlarged lymph nodes/no swelling of extremity/no tender lymph nodes

## 2019-12-27 NOTE — ASU PREOP CHECKLIST - SPO2 (%)
[facility] [place] 
[facility] 
[address] [address] [phone] Work/School Note Date: 12/27/2019 To Whom It May concern: 
 
Juliano Keith was seen and treated today in the emergency room by the following provider(s): 
Attending Provider: Nivia Lowe MD.   
 
 
Sincerely, Attending Provider: Nivia Lowe MD 
 
 

98

## 2020-12-24 NOTE — PROGRESS NOTE ADULT - ASSESSMENT
Problem: Skin Integrity:  Goal: Will show no infection signs and symptoms  Description: Will show no infection signs and symptoms  Outcome: Ongoing     Problem: Skin Integrity:  Goal: Absence of new skin breakdown  Description: Absence of new skin breakdown  Outcome: Ongoing     Problem: Falls - Risk of:  Goal: Will remain free from falls  Description: Will remain free from falls  Outcome: Ongoing     Problem: Falls - Risk of:  Goal: Absence of physical injury  Description: Absence of physical injury  Outcome: Ongoing     Problem: Pain:  Goal: Pain level will decrease  Description: Pain level will decrease  Outcome: Ongoing     Problem: Pain:  Goal: Control of acute pain  Description: Control of acute pain  Outcome: Ongoing     Problem: Pain:  Goal: Control of chronic pain  Description: Control of chronic pain  Outcome: Ongoing     Problem: Mobility - Impaired:  Goal: Mobility will improve  Description: Mobility will improve  Outcome: Ongoing The patient is an 83 year old female with a history of HTN, hypothyroidism, LBBB, dementia who presents with syncope.    Plan:  - Etiology of syncope likely vasovagal in origin given both BP and HR were low. Possibly orthostatic hypotension.  - ECG with underlying LBBB (old). Otherwise no predisposing features for syncope.  - Monitor on telemetry. No events thus far.  - Two sets of cardiac enzymes negative  - Echocardiogram read pending. Grossly normal LV systolic function, no significant valve issues.  - Orthostatics initially positive, now negative  - Continue losartan for HTN  - Discharge planning

## 2021-04-13 NOTE — DISCHARGE NOTE ADULT - CAREGIVER ADDRESS
[address] [facility] Note     Patient Name: Shawna Perez  Encounter Date: 04/13/2021     Have you been cared for by a [facility] Dermatologist in the last 3 years and, if so, which one? No    · Have you traveled outside of the [address] [place] [address] in the past 3 months or outside of the [facility] area in the last 2 weeks? No     May we call your Preferred Phone number to discuss your specific medical information? Yes     May we leave a detailed message that includes your specific medical information? Yes      Today's Chief Concerns:   Concern #1:  Eczema    Concern #2:      Past Medical History:  Have you personally ever had or currently have any of the following? · Skin cancer (such as Melanoma, Basal Cell Carcinoma, Squamous Cell Carcinoma? (If Yes, please provide more detail)- No  · Eczema: No  · Psoriasis: No  · HIV/AIDS: No  · Hepatitis B or C: No  · Tuberculosis: No  · Systemic Immunosuppression such as Diabetes, Biologic or Immunotherapy, Chemotherapy, Organ Transplantation, Bone Marrow Transplantation (If YES, please provide more detail): No  · Radiation Treatment (If YES, please provide more detail): No  · Any other major medical conditions/concerns? (If Yes, which types)- No    Social History:     What is/was your primary occupation? child     What are your hobbies/past-times? Family History:  Have any of your "first degree relatives" (parent, brother, sister, or child) had any of the following       · Skin cancer such as Melanoma or Merkel Cell Carcinoma or Pancreatic Cancer? No  · Eczema, Asthma, Hay Fever or Seasonal Allergies: YES, uncle  · Psoriasis or Psoriatic Arthritis: No  · Do any other medical conditions seem to run in your family? If Yes, what condition and which relatives?   No    Current Medications:   (please update all dermatological medications before printing patient's AVS!)      Current Outpatient Medications:     cetirizine (ZyrTEC) oral solution, TAKE 2 5 ML ONCE A DAY, Disp: , Rfl:     Flovent HFA 44 MCG/ACT inhaler, Inhale 2 puffs 2 (two) times a day, Disp: , Rfl:     sodium chloride 0 9 % nebulizer solution, TAKE 3 ML BY NEBULIZATION AS NEEDED FOR WHEEZING , Disp: , Rfl:     triamcinolone (KENALOG) 0 1 % ointment, Bid prn, Disp: , Rfl:       Review of Systems:  Have you recently had or currently have any of the following? If YES, what are you doing for the problem? · Fever, chills or unintended weight loss: No  · Sudden loss or change in your vision: No  · Nausea, vomiting or blood in your stool: No  · Painful or swollen joints: No  · Wheezing or cough: No  · Changing mole or non-healing wound: No  · Nosebleeds: No  · Excessive sweating: No  · Easy or prolonged bleeding? No  · Over the last 2 weeks, how often have you been bothered by the following problems? · Taking little interest or pleasure in doing things: 1 - Not at All  · Feeling down, depressed, or hopeless: 1 - Not at All  · Rapid heartbeat with epinephrine:  No    · FEMALES ONLY:    · Are you pregnant or planning to become pregnant? N/A  · Are you currently or planning to be nursing or breast feeding? N/A    · Any known allergies? No Known Allergies      Physical Exam:     Was a chaperone (Derm Clinical Assistant) present throughout the entire Physical Exam? Yes     Did the Dermatology Team specifically  the patient on the importance of a Full Skin Exam to be sure that nothing is missed clinically?  Yes}  o Did the patient ultimately request or accept a Full Skin Exam?  Yes  o Did the patient specifically refuse to have the areas "under-the-bra" examined by the Dermatologist? No  o Did the patient specifically refuse to have the areas "under-the-underwear" examined by the Dermatologist? No    CONSTITUTIONAL:   Vitals:    04/13/21 1442   Temp: 97 8 °F (36 6 °C)   Weight: 9 979 kg (22 lb)           PSYCH: Normal mood and affect  EYES: Normal conjunctiva  ENT: Normal lips and oral mucosa  CARDIOVASCULAR: No edema  RESPIRATORY: Normal respirations  HEME/LYMPH/IMMUNO:  No regional lymphadenopathy except as noted below in "ASSESSMENT AND PLAN BY DIAGNOSIS"    SKIN:  FULL ORGAN SYSTEM EXAM   Hair, Scalp, Ears, Face Normal except as noted below in Assessment   Neck, Cervical Chain Nodes Normal except as noted below in Assessment   Right Arm/Hand/Fingers Normal except as noted below in Assessment   Left Arm/Hand/Fingers Normal except as noted below in Assessment   Chest/Breasts/Axillae Viewed areas Normal except as noted below in Assessment   Abdomen, Umbilicus Normal except as noted below in Assessment   Back/Spine Normal except as noted below in Assessment   Groin/Genitalia/Buttocks Normal except as noted below in Assessment   Right Leg, Foot, Toes Normal except as noted below in Assessment   Left Leg, Foot, Toes Normal except as noted below in Assessment        Assessment and Plan by Diagnosis:    History of Present Condition:     Duration:  How long has this been an issue for you?    o  4 month old    Location Affected:  Where on the body is this affecting you?    o  right leg and right arm    Quality:  Is there any bleeding, pain, itch, burning/irritation, or redness associated with the skin lesion? o  redness   Severity:  Describe any bleeding, pain, itch, burning/irritation, or redness on a scale of 1 to 10 (with 10 being the worst)    o  n/a   Timing:  Does this condition seem to be there pretty constantly or do you notice it more at specific times throughout the day?    o  denies   Context:  Have you ever noticed that this condition seems to be associated with specific activities you do?    o  denies   Modifying Factors:    o Anything that seems to make the condition worse?    -  denies  o What have you tried to do to make the condition better?    -  triamcinolone and hydrocortisone 2 5%   Associated Signs and Symptoms:  Does this skin lesion seem to be associated with any of the following:  o  SL AMB DERM SIGNS AND SYMPTOMS: Redness and Crusting     1  TINEA CORPORIS ("RING WORM")    Physical Exam:   Anatomic Location Affected:  Right lower leg, left buttocks    Morphological Description:  Round annular pinkish-red papules and one longer, more ovoid mildly scaly plaque   Pertinent Positives: +KOH (floridly positive)   Pertinent Negatives: No regional lymphadenopathy    Additional History of Present Condition:  Patient mother states it started at 3 months and has treated with triamcinolone ointment and hydrocortisone 2 5%  Patient was also given nystatin cream with no improvement  Assessment and Plan:  Based on a thorough discussion of this condition and the management approach to it (including a comprehensive discussion of the known risks, side effects and potential benefits of treatment), the patient (family) agrees to implement the following specific plan:   Ketoconazole cream apply topically three times a day for 4-6 weeks on leg, face and buttocks    Fungal culture was taken today    Mom understands that we want to see patient back at 8 weeks even if skin is perfectly normal and clear    Tinea Corporis  Tinea corporis (ringworm) is the name used for infection of the trunk, legs or arms with a dermatophyte fungus  In different parts of the world, different species cause tinea corporis  Infection often comes from the feet (tinea pedis) or nails (tinea unguium) originally  Microsporum canis (M  canis) from cats and dogs, and T  verrucosum, from farm cattle, are also common  Tinea corporis may be acute (sudden onset and rapid spread) or chronic (slow extension of a mild, barely inflamed, rash)  It usually affects exposed areas but may also spread from other infected sites  Acute tinea corporis presents as itchy inflamed red patches and may be pustular  The cause is often infection by an animal (zoophilic) fungus such as M canis      Chronic tinea corporis tends to be most prominent in body folds (spreading from tinea cruris)  T  rubrum is the most common cause  If widespread, the condition tends to be stubborn to treat and prone to recurrence  This is possibly due to a decreased natural skin resistance to fungi or because of reinfection from the environment  The term "ringworm" refers to round or oval red scaly patches, often less red and scaly in the middle or healed in the middle  Sometimes one ring arises inside another older ring  Levy Neptali is an inflamed fungal abscess  It presents as a boggy mass studied with pustules, often with satellite spots  It is often confused with a large boil or carbuncle or a tumour such as a skin cancer  Majocchi granuloma describes tinea corporis involving the hair follicles resulting in pustules and nodules  Brigida Michaud is due to Juan Manuell Duffel and occurs in the [place] and other tropical areas  It results in brown scaly concentric rings  Non-fungal conditions resembling tinea corporis include:   Impetigo   Seborrhoeic dermatitis   Psoriasis   Discoid eczema   Lichen simplex   Contact allergic dermatitis   Pityriasis rosea    The diagnosis of tinea corporis may be confirmed by microscopy and culture of skin scrapings  Occasionally, the diagnosis is made on skin biopsy because of characteristic histopathological features of tinea corporis and organisms may be found in the outside layers of the skin  Tinea corporis is usually treated with topical antifungal agents, but if the treatment is unsuccessful,  oral antifungal medicines may be considered, including terbinafine and itraconazole  oral antifungal medicines may be considered, including terbinafine and itraconazole  2  SEBORRHEIC DERMATITIS    Physical Exam:   Anatomic Location Affected:  scalp   Morphological Description:   Thick adherent yellow greasy scale   Pertinent Positives:   Pertinent Negatives: No cervical chain LAD    Additional History of Present Condition:  Patient mother states she did have mild dry scalp when she was younger  Assessment and Plan:  Based on a thorough discussion of this condition and the management approach to it (including a comprehensive discussion of the known risks, side effects and potential benefits of treatment), the patient (family) agrees to implement the following specific plan:   Ketoconazole shampoo Daily for 2 weeks straight and then on "Mondays, Wednesdays and Fridays":  Use as a body wash and for the scalp; leave on for 5 minutes and then rinse off completely  Seborrheic Dermatitis   Seborrheic dermatitis is a common, chronic or relapsing form of eczema/dermatitis that mainly affects the sebaceous, gland-rich regions of the scalp, face, and trunk  There are infantile and adult forms of seborrhoeic dermatitis  It is sometimes associated with psoriasis and, in that clinical scenario, may be referred to as "sebo-psoriasis "  Seborrheic dermatitis is also known as "seborrheic eczema "  Dandruff (also called "pityriasis capitis") is an uninflamed form of seborrhoeic dermatitis  Dandruff presents as bran-like scaly patches scattered within hair-bearing areas of the scalp  In an infant, this condition may be referred to as "cradle cap "  The cause of seborrheic dermatitis is not completely understood  It is associated with proliferation of various species of the skin commensal Malassezia, in its yeast (non-pathogenic) form  Its metabolites (such as the fatty acids oleic acid, malssezin, and indole-3-carbaldehyde) may cause an inflammatory reaction  Differences in skin barrier lipid content and function may account for individual presentations  Infantile Seborrheic Dermatitis  Infantile seborrheic dermatitis affects babies under the age of 1 months and usually resolves by 1012 months of age  Infantile seborrheic dermatitis causes "cradle cap" (diffuse, greasy scaling on scalp)  The rash may spread to affect armpit and groin folds (a type of "napkin dermatitis")  There may be associated salmon-pink colored patches that may flake or peel  The rash in this case is usually not especially itchy, so the baby often appears undisturbed by the rash, even when more generalized  Adult Seborrheic Dermatitis  Adult seborrheic dermatitis tends to begin in late adolescence; prevalence is greatest in young adults and in the elderly  It is more common in males than in females  The following factors are sometimes associated with severe adult seborrheic dermatitis:   Oily skin   Familial tendency to seborrhoeic dermatitis or a family history of psoriasis   Immunosuppression: organ transplant recipient, human immunodeficiency virus (HIV) infection and patients with lymphoma   Neurological and psychiatric diseases: Parkinson disease, tardive dyskinesia, depression, epilepsy, facial nerve palsy, spinal cord injury and congenital disorders such as Down syndrome   Treatment for psoriasis with psoralen and ultraviolet A (PUVA) therapy   Lack of sleep   Stressful events  In adults, seborrheic dermatitis may typically affect the scalp, face (creases around the nose, behind ears, within eyebrows) and upper trunk  Typical clinical features include:   Winter flares, improving in summer following sun exposure   Minimal itch most of the time   Combination oily and dry mid-facial skin   Ill-defined localized scaly patches or diffuse scale in the scalp   Blepharitis; scaly red eyelid margins   Salters-pink, thin, scaly, and ill-defined plaques in skin folds on both sides of the face   Petal or ring-shaped flaky patches on hair-line and on anterior chest   Rash in armpits, under the breasts, in the groin folds and genital creases   Superficial folliculitis (inflamed hair follicles) on cheeks and upper trunk    Seborrheic dermatitis is diagnosed by its clinical appearance and behavior   Skin biopsy may be helpful but is rarely necessary to make this diagnosis        Scribe Attestation    I,:  Camden Ash am acting as a scribe while in the presence of the attending physician :       I,:  Yousif Oliveira MD personally performed the services described in this documentation    as scribed in my presence : [address]abella buck

## 2023-06-29 NOTE — INPATIENT CERTIFICATION FOR MEDICARE PATIENTS - CURRENT MEDICAL NEEDS AND CARE PLANS
[facility] General Surgery Clinic Note    HPI:   61 y.o. 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 Patient states that she has finished her chemotherapy. She has had hysterectomy and oophorectomy. She denies any problems with her mastectomy flaps. Denies any new masses or problems with her incisions. Reports some neuropathic pain associated with her prosthesis but states that this is manageable.      PMH:   Past Medical History:   Diagnosis Date    Malignant neoplasm of right breast in female, estrogen receptor negative     infiltrating ductal, s/p right simple mastectomy, SNL biopsy, triple neg    Primary hypertension     Vitamin D deficiency       Meds:   Current Outpatient Medications:     amLODIPine (NORVASC) 10 MG tablet, Take 1 tablet (10 mg total) by mouth once daily., Disp: 90 tablet, Rfl: 0    hydroCHLOROthiazide (MICROZIDE) 12.5 mg capsule, Take 1 capsule (12.5 mg total) by mouth once daily., Disp: 90 capsule, Rfl: 0    LIDOcaine (LIDODERM) 5 %, Place 1 patch onto the skin once daily. Remove & Discard patch within 12 hours or as directed by MD, Disp: 30 patch, Rfl: 2    olaparib (LYNPARZA) tablet 150 mg, Take 2 tablets (300 mg total) by mouth 2 (two) times daily., Disp: 120 tablet, Rfl: 6    polyethylene glycol (MIRALAX) 17 gram/dose powder, Take 17 g by mouth once daily., Disp: 850 g, Rfl: 2  No current facility-administered medications for this visit.    Facility-Administered Medications Ordered in Other Visits:     albuterol-ipratropium 2.5 mg-0.5 mg/3 mL nebulizer solution 3 mL, 3 mL, Nebulization, Once PRN, Viky Su MD    HYDROmorphone injection 0.2 mg, 0.2 mg, Intravenous, Q5 Min PRN, Viky Su MD    HYDROmorphone injection 0.5 mg, 0.5 mg, Intravenous, Q5 Min PRN, Viky Su MD    lactated ringers infusion, , Intravenous, Continuous, Viky Su MD,  Last Rate: 10 mL/hr at 22, New Bag at 22    lactated ringers infusion, , Intravenous, Continuous, Joanne Kate MD, Last Rate: 10 mL/hr at 23, New Bag at 23    LIDOcaine (PF) 10 mg/ml (1%) injection 10 mg, 1 mL, Intradermal, Once, Edelmira S Leon-Alanis, FNP    LIDOcaine (PF) 10 mg/ml (1%) injection 10 mg, 1 mL, Intradermal, Once, Edelmira S Leon-Alanis, FNP    ondansetron injection 4 mg, 4 mg, Intravenous, Once, Viky Su MD    prochlorperazine injection Soln 5 mg, 5 mg, Intravenous, Once PRN, Viky Su MD  Allergies: Review of patient's allergies indicates:  No Known Allergies  Social History:   Social History     Tobacco Use    Smoking status: Former     Packs/day: 0.50     Years: 24.00     Pack years: 12.00     Types: Cigarettes     Quit date:      Years since quittin.4    Smokeless tobacco: Never   Substance Use Topics    Alcohol use: Not Currently    Drug use: Yes     Types: Marijuana     Comment: on weekends     Family History:   Family History   Problem Relation Age of Onset    Stroke Mother     Hypertension Mother     Diabetes Mother     Hypertension Father     Prostate cancer Father         metastatic    Heart disease Father     Prostate cancer Brother 50        metastatic    Prostate cancer Brother 57    Breast cancer Paternal Aunt     Breast cancer Paternal Aunt     Breast cancer Paternal Aunt     Breast cancer Paternal Aunt     Breast cancer Paternal Cousin     Other Daughter         BRCA2 negative, [facility]      Surgical History:   Past Surgical History:   Procedure Laterality Date    CYSTOSCOPY N/A 2023    Procedure: CYSTOSCOPY;  Surgeon: Joelle Lira MD;  Location: [facility];  Service: OB/GYN;  Laterality: N/A;    HERNIA REPAIR      HYSTERECTOMY, TOTAL, LAPAROSCOPIC, WITH SALPINGO-OOPHORECTOMY N/A 2023    Procedure: HYSTERECTOMY,TOTAL,LAPAROSCOPIC,WITH SALPINGO-OOPHORECTOMY;  Surgeon: Joelle Lira MD;  Location:  [facility] OR;  Service: OB/GYN;  Laterality: N/A;  enseal and luken's trap    MEDIPORT INSERTION, SINGLE  05/23/2022    REMOVAL OF VASCULAR ACCESS PORT Left 12/14/2022    Procedure: REMOVAL, VASCULAR ACCESS PORT;  Surgeon: Sofie Powers MD;  Location: [facility] OR;  Service: General;  Laterality: Left;    SIMPLE MASTECTOMY Left 12/14/2022    Procedure: MASTECTOMY, SIMPLE;  Surgeon: Sofie Powers MD;  Location: [facility] OR;  Service: General;  Laterality: Left;    SIMPLE MASTECTOMY W/ SENTINEL NODE BIOPSY Right 04/11/2022    UMBILICAL HERNIA REPAIR N/A 1966     Review of Systems:  Negative except for HPI    Objective:    Vitals:  Vitals:    06/29/23 1326   BP: (!) 134/91   Pulse: 91   Resp: 18   Temp: 98.2 °F (36.8 °C)        Physical Exam:  Gen: NAD  Neuro: awake, alert, answering questions appropriately  CV: RRR  Breast: Mastectomy flaps intact. Incisions without erythema, edema or drainage. No masses.  Resp: non-labored breathing, EDWAR  Abd: soft, ND, NT  Ext: moves all 4 spontaneously and purposefully  Skin: warm, well perfused    Assessment/Plan:  61 y.o. 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

## 2023-11-07 NOTE — ED PROVIDER NOTE - NS_EDPROVIDERDISPOUSERTYPE_ED_A_ED
07-Nov-2023 11:49 Scribe Attestation (For Scribes USE Only)... Attending Attestation (For Attendings USE Only).../Scribe Attestation (For Scribes USE Only)...

## 2023-12-22 NOTE — PATIENT PROFILE ADULT. - EXTENSIONS OF SELF_ADULT
[FreeTextEntry1] : 76 yo M PMH HTN DM HLD presents for follow up.  #R hip pain - patient requesting roller, will refer to PT for eval - will refer to PT wheelchair clinic - R hip XR reviewed with patient : 10/13/2023 No acute fracture is seen. 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 There is moderate left hip arthrosis. Degenerative changes are seen in the visualized lower lumbar spine. The patient is noted to be status post Urolift of the prostate. - c/w with Voltron gel. Advised pt to call [facility] because they have more refills  #Chest pain, intermittent. No sx presently -reordered TTE and Carotid US, advised pt to call to make appt  #HTN -c/w amlodipine and lisinopril. sent rx to new pharmacy: [facility]  -needs blood pressure machine  #DM2 -reviewed A1c 6.1 (9/29/2023) from 7.7 (6/2023) -c/w metformin -c/w diet and exercise  #HLD -reviewed lipid profile and CMP (9/29/2023) -c/w diet and exercise -c/w atorvastatin, sent rx to new pharmacy: [facility]   #HCM - repeat A1C - fu 6 months. None

## 2024-01-02 NOTE — GOALS OF CARE CONVERSATION - PERSONAL ADVANCE DIRECTIVE - PARTICIPANTS
Diagnosis: Bronchitis  viral   Today we did:  Chest xray no signs of pneumonia     Labs: no elevated wbc count   No elevated bacterial count   Not likely a sinus infection   Plan:   Bronchitis is caused by a virus,   You will recover in 1-2 weeks  But your cough may persist for longer   Inhalers, steroids} and tessalon}   OTC cold medicines  Robitussin +D; day-Quil, nght-quil   Decongestants -Sudafed    REST   Drinking plenty of fluids,   such as water, juice, or warm soup.   Fluids thin mucus so that you can cough it up.   This helps you breathe more easily. Fluids also prevent dehydration.  Using a humidifier. This can help reduce coughing.  If you smoke, stop smoking! This include vaping     Monitor for:   Fever of 100.4 F ( 38.0 C) or higher, or as directed by your healthcare provider  Symptoms that get worse, or new symptoms  Symptoms that don't start to get better in 1 week  Trouble breathing that doesn't get better with treatment  Skin, lips, or nails that look blue, gray, or pale         Bronchitis  Bronchitis is infection or inflammation of the airways in the lungs (bronchial tubes). Normally, air moves easily in and out of the airways.   Bronchitis narrows the airways. This makes it harder for air to flow in and out of the lungs.   This causes symptoms, such as shortness of breath, coughing up yellow or green mucus, and wheezing.   Can also have: chest pain, fevers, feeling tired   Bronchitis can be acute or chronic.   Acute means it happens quickly and goes away in a short time, usually caused by a cold, flu or other virus   Most people with acute bronchitis get better in 1 to 2 weeks.   Chronic means a condition lasts a long time and often comes back, this is typically seen in people who were or are smokers  Acute bronchitis is usually caused by a virus, such as a cold or the flu.   In very rare cases, it may be caused by bacteria.   Certain factors make it more likely for a cold or flu to turn into  Family... bronchitis.   These include being very young, being elderly, having a heart or lung problem, or having a weak immune system.   Smoking also makes bronchitis more likely.  When bronchitis develops, the airways become swollen.   Later on (usually >2 weeks) the airways may also become infected with bacteria. This is known as a secondary infection.      To diagnose bronchitis your provider will listen to your lungs while you breathe.   You may have a chest X-ray to look for (and rule out) a worse infection in the lungs (pneumonia) if you have had a fever.   You may also have a blood test or nose or throat swab to check for infection.

## 2024-04-06 NOTE — ED ADULT NURSE NOTE - CARDIO WDL
As discussed, hold your Tavneos as this is likely causing your liver function abnormality  Avoid alcohol, tylenol  Follow up closely with your doctor for a recheck of your liver function tests  Return if any acute worsening of symptoms, fever, intractable pain or any other concerns  
Normal rate, regular rhythm

## 2024-04-20 NOTE — PHYSICAL THERAPY INITIAL EVALUATION ADULT - PERSONAL SAFETY AND JUDGMENT, REHAB EVAL
The patient's goals for the shift include      The clinical goals for the shift include monitor labs    Over the shift, the patient did not make progress toward the following goals. Barriers to progression include none. Recommendations to address these barriers include none.     intact

## 2024-05-23 NOTE — ED ADULT TRIAGE NOTE - CCCP TRG CHIEF CMPLNT
Bleeding that does not stop/Swelling that gets worse/Pain not relieved by Medications/Fever greater than (need to indicate Fahrenheit or Celsius)/Unable to urinate syncope/bradycardic

## 2024-06-24 NOTE — ED ADULT NURSE NOTE - NURSING MUSC ROM
ROGERIO for patient regarding surgery this week and asked for a return call     Alexa   Clinical & Surgical Assistant to Dr. Caroline Quintero   
full range of motion in all extremities

## 2024-09-26 NOTE — DISCHARGE NOTE ADULT - HOSPITAL COURSE
36.7
84 YO WF with hx of BREAST CA, S/P LUMPECTOMY IN 2016 ,HTN ,HYPOTHYROIDISM ,HYPERPARATHYROIDISM ,DEPRESSION ,DEMENTIA ,CAD/LBBB,S/P CTS SURGERY, CATARACT SX ,recent admission with vasovagal syncope due to dehydration presents with diarrhea for the past 5 days ,associated with nausea, no vomiting. She complained of abdominal pain to aide a few days ago,has a poor po intake last few days ,  no vomiting. no fevers. Has not taken anything for pain or symptoms. patient is a poor historian , but able to recognise me and answer simple questions ,she has  history of dementia, no mental status changes per aide .CT abdomen was done and demonstrated proctocolitis ,admitted for GI workup Admitted for septic workup and evaluation,send blood and urine cx,serial lactate levels,monitor vitals closley,ivfs hydration,monitor urine output and renal profile,iv abx initiated -on cipro and falgyl Palliative care consult requested ,to discuss advance directives and complete /update MOLST (10 Oct 2018 16:34)Diagnosed with acute proctocolitis and improved on iv cipro and flagyl ,ruled out for c.diff colitis ,cleared for d/c by GI cons Dr Ashford on 10 days of po abx .FOLLOWUP WITH PCP AND GI IN 2-3 WEEKS

## 2025-02-15 NOTE — ED PROVIDER NOTE - ATTENDING CONTRIBUTION TO CARE
History of an old stroke no deficits  CT scan shows old infarct in the occipital region   I performed a history and physical exam of the patient and discussed their management with the advanced care provider. I reviewed the advanced care provider's note and agree with the documented findings and plan of care. My medical decision making and objective findings are found above.

## 2025-03-24 NOTE — H&P PST ADULT - NSWEIGHTCALCTOOLDRUG_GEN_A_CORE
Detail Level: Detailed
General Sunscreen Counseling: I recommended a broad spectrum sunscreen with a SPF of 30 or higher.  I explained that SPF 30 sunscreens block approximately 97 percent of the sun's harmful rays.  Sunscreens should be applied at least 15 minutes prior to expected sun exposure and then every 2 hours after that as long as sun exposure continues. If swimming or exercising sunscreen should be reapplied every 45 minutes to an hour after getting wet or sweating.  I also recommended a lip balm with a sunscreen as well. Sun protective clothing can be used in lieu of sunscreen but must be worn the entire time you are exposed to the sun's rays.
 used

## 2025-06-05 NOTE — PATIENT PROFILE ADULT - FUNCTIONAL SCREEN CURRENT LEVEL: EATING, MLM
Accession #: [id number] Size Of Lesion In Cm: 0.6 X Size Of Lesion In Cm (Optional): 0 Size Of Margin In Cm: 0.4 Anesthesia Volume In Cc: 5 Was An Eye Clamp Used?: No Eye Clamp Note Details: An eye clamp was used during the procedure. Excision Method: Elliptical Saucerization Depth: dermis and superficial adipose tissue Repair Type: Intermediate Intermediate / Complex Repair - Final Wound Length In Cm: 3 Deep Sutures: 4-0 Vicryl Epidermal Sutures: 4-0 Ethilon Suture Removal: 14 days Suturegard Retention Suture: 2-0 Nylon Retention Suture Bite Size: 3 mm Length To Time In Minutes Device Was In Place: 10 Number Of Hemigard Strips Per Side: 1 Undermining Type: Entire Wound Debridement Text: The wound edges were debrided prior to proceeding with the closure to facilitate wound healing. Helical Rim Text: The closure involved the helical rim. Vermilion Border Text: The closure involved the vermilion border. Nostril Rim Text: The closure involved the nostril rim. Retention Suture Text: Retention sutures were placed to support the closure and prevent dehiscence. Graft Donor Site Bandage (Optional-Leave Blank If You Don't Want In Note): Steri-strips and a pressure bandage were applied to the donor site. Epidermal Closure Graft Donor Site (Optional): simple interrupted Billing Type: Third-Party Bill Excision Depth: adipose tissue Scalpel Size: 15 blade Anesthesia Type: 1% lidocaine with epinephrine Hemostasis: Electrocautery Estimated Blood Loss (Cc): minimal Detail Level: Detailed Repair Depth: use same depth as excision depth Epidermal Closure: interrupted horizontal mattress Wound Care: Petrolatum Dressing: dry sterile dressing Suturegard Intro: Intraoperative tissue expansion was performed, utilizing the SUTUREGARD device, in order to reduce wound tension. Suturegard Body: The suture ends were repeatedly re-tightened and re-clamped to achieve the desired tissue expansion. Hemigard Intro: Due to skin fragility and wound tension, it was decided to use HEMIGARD adhesive retention suture devices to permit a linear closure. The skin was cleaned and dried for a 6cm distance away from the wound. Excessive hair, if present, was removed to allow for adhesion. Hemigard Postcare Instructions: The HEMIGARD strips are to remain completely dry for at least 5-7 days. Positioning (Leave Blank If You Do Not Want): The patient was placed in a comfortable position exposing the surgical site. Pre-Excision Curettage Text (Leave Blank If You Do Not Want): Prior to drawing the surgical margin the visible lesion was removed with curettage to clearly define the lesion size. Complex Repair Preamble Text (Leave Blank If You Do Not Want): Extensive wide undermining was performed. Intermediate Repair Preamble Text (Leave Blank If You Do Not Want): Undermining was performed with blunt dissection. Curvilinear Excision Additional Text (Leave Blank If You Do Not Want): The margin was drawn around the clinically apparent lesion.  A curvilinear shape was then drawn on the skin incorporating the lesion and margins.  Incisions were then made along these lines to the appropriate tissue plane and the lesion was extirpated. Fusiform Excision Additional Text (Leave Blank If You Do Not Want): The margin was drawn around the clinically apparent lesion.  A fusiform shape was then drawn on the skin incorporating the lesion and margins.  Incisions were then made along these lines to the appropriate tissue plane and the lesion was extirpated. Elliptical Excision Additional Text (Leave Blank If You Do Not Want): The margin was drawn around the clinically apparent lesion.  An elliptical shape was then drawn on the skin incorporating the lesion and margins.  Incisions were then made along these lines to the appropriate tissue plane and the lesion was extirpated. Saucerization Excision Additional Text (Leave Blank If You Do Not Want): The margin was drawn around the clinically apparent lesion.  Incisions were then made along these lines, in a tangential fashion, to the appropriate tissue plane and the lesion was extirpated. Slit Excision Additional Text (Leave Blank If You Do Not Want): A linear line was drawn on the skin overlying the lesion. An incision was made slowly until the lesion was visualized.  Once visualized, the lesion was removed with blunt dissection. Excisional Biopsy Additional Text (Leave Blank If You Do Not Want): The margin was drawn around the clinically apparent lesion. An elliptical shape was then drawn on the skin incorporating the lesion and margins.  Incisions were then made along these lines to the appropriate tissue plane and the lesion was extirpated. Perilesional Excision Additional Text (Leave Blank If You Do Not Want): The margin was drawn around the clinically apparent lesion. Incisions were then made along these lines to the appropriate tissue plane and the lesion was extirpated. Repair Performed By Another Provider Text (Leave Blank If You Do Not Want): After the tissue was excised the defect was repaired by another provider. No Repair - Repaired With Adjacent Surgical Defect Text (Leave Blank If You Do Not Want): After the excision the defect was repaired concurrently with another surgical defect which was in close approximation. Adjacent Tissue Transfer Text: The defect edges were debeveled with a #15 scalpel blade. Given the location of the defect and the proximity to free margins an adjacent tissue transfer was deemed most appropriate. Using a sterile surgical marker, an appropriate flap was drawn incorporating the defect and placing the expected incisions within the relaxed skin tension lines where possible. The area thus outlined was incised deep to adipose tissue with a #15 scalpel blade. The skin margins were undermined to an appropriate distance in all directions utilizing iris scissors and carried over to close the primary defect. Advancement Flap (Single) Text: The defect edges were debeveled with a #15 scalpel blade. Given the location of the defect and the proximity to free margins a single advancement flap was deemed most appropriate. Using a sterile surgical marker, an appropriate advancement flap was drawn incorporating the defect and placing the expected incisions within the relaxed skin tension lines where possible. The area thus outlined was incised deep to adipose tissue with a #15 scalpel blade. The skin margins were undermined to an appropriate distance in all directions utilizing iris scissors. Following this, the designed flap was advanced and carried over into the primary defect and sutured into place. Advancement Flap (Double) Text: The defect edges were debeveled with a #15 scalpel blade. Given the location of the defect and the proximity to free margins a double advancement flap was deemed most appropriate. Using a sterile surgical marker, the appropriate advancement flaps were drawn incorporating the defect and placing the expected incisions within the relaxed skin tension lines where possible. The area thus outlined was incised deep to adipose tissue with a #15 scalpel blade. The skin margins were undermined to an appropriate distance in all directions utilizing iris scissors. Following this, the designed flaps were advanced and carried over into the primary defect and sutured into place. Burow's Advancement Flap Text: The defect edges were debeveled with a #15 scalpel blade. Given the location of the defect and the proximity to free margins a Burow's advancement flap was deemed most appropriate. Using a sterile surgical marker, the appropriate advancement flap was drawn incorporating the defect and placing the expected incisions within the relaxed skin tension lines where possible. The area thus outlined was incised deep to adipose tissue with a #15 scalpel blade. The skin margins were undermined to an appropriate distance in all directions utilizing iris scissors. Following this, the designed flap was advanced and carried over into the primary defect and sutured into place. Chonodrocutaneous Helical Advancement Flap Text: The defect edges were debeveled with a #15 scalpel blade. Given the location of the defect and the proximity to free margins a chondrocutaneous helical advancement flap was deemed most appropriate. Using a sterile surgical marker, the appropriate advancement flap was drawn incorporating the defect and placing the expected incisions within the relaxed skin tension lines where possible. The area thus outlined was incised deep to adipose tissue with a #15 scalpel blade. The skin margins were undermined to an appropriate distance in all directions utilizing iris scissors. Following this, the designed flap was advanced and carried over into the primary defect and sutured into place. Crescentic Advancement Flap Text: The defect edges were debeveled with a #15 scalpel blade. Given the location of the defect and the proximity to free margins a crescentic advancement flap was deemed most appropriate. Using a sterile surgical marker, the appropriate advancement flap was drawn incorporating the defect and placing the expected incisions within the relaxed skin tension lines where possible. The area thus outlined was incised deep to adipose tissue with a #15 scalpel blade. The skin margins were undermined to an appropriate distance in all directions utilizing iris scissors. Following this, the designed flap was advanced and carried over into the primary defect and sutured into place. 0 = independent A-T Advancement Flap Text: The defect edges were debeveled with a #15 scalpel blade. Given the location of the defect, shape of the defect and the proximity to free margins an A-T advancement flap was deemed most appropriate. Using a sterile surgical marker, an appropriate advancement flap was drawn incorporating the defect and placing the expected incisions within the relaxed skin tension lines where possible. The area thus outlined was incised deep to adipose tissue with a #15 scalpel blade. The skin margins were undermined to an appropriate distance in all directions utilizing iris scissors. Following this, the designed flap was advanced and carried over into the primary defect and sutured into place. O-T Advancement Flap Text: The defect edges were debeveled with a #15 scalpel blade. Given the location of the defect, shape of the defect and the proximity to free margins an O-T advancement flap was deemed most appropriate. Using a sterile surgical marker, an appropriate advancement flap was drawn incorporating the defect and placing the expected incisions within the relaxed skin tension lines where possible. The area thus outlined was incised deep to adipose tissue with a #15 scalpel blade. The skin margins were undermined to an appropriate distance in all directions utilizing iris scissors. Following this, the designed flap was advanced and carried over into the primary defect and sutured into place. O-L Flap Text: The defect edges were debeveled with a #15 scalpel blade. Given the location of the defect, shape of the defect and the proximity to free margins an O-L flap was deemed most appropriate. Using a sterile surgical marker, an appropriate advancement flap was drawn incorporating the defect and placing the expected incisions within the relaxed skin tension lines where possible. The area thus outlined was incised deep to adipose tissue with a #15 scalpel blade. The skin margins were undermined to an appropriate distance in all directions utilizing iris scissors. Following this, the designed flap was advanced and carried over into the primary defect and sutured into place. O-Z Flap Text: The defect edges were debeveled with a #15 scalpel blade. Given the location of the defect, shape of the defect and the proximity to free margins an O-Z flap was deemed most appropriate. Using a sterile surgical marker, an appropriate transposition flap was drawn incorporating the defect and placing the expected incisions within the relaxed skin tension lines where possible. The area thus outlined was incised deep to adipose tissue with a #15 scalpel blade. The skin margins were undermined to an appropriate distance in all directions utilizing iris scissors. Following this, the designed flap was carried over into the primary defect and sutured into place. Double O-Z Flap Text: The defect edges were debeveled with a #15 scalpel blade. Given the location of the defect, shape of the defect and the proximity to free margins a Double O-Z flap was deemed most appropriate. Using a sterile surgical marker, an appropriate transposition flap was drawn incorporating the defect and placing the expected incisions within the relaxed skin tension lines where possible. The area thus outlined was incised deep to adipose tissue with a #15 scalpel blade. The skin margins were undermined to an appropriate distance in all directions utilizing iris scissors. Following this, the designed flap was carried over into the primary defect and sutured into place. V-Y Flap Text: The defect edges were debeveled with a #15 scalpel blade. Given the location of the defect, shape of the defect and the proximity to free margins a V-Y flap was deemed most appropriate. Using a sterile surgical marker, an appropriate advancement flap was drawn incorporating the defect and placing the expected incisions within the relaxed skin tension lines where possible. The area thus outlined was incised deep to adipose tissue with a #15 scalpel blade. The skin margins were undermined to an appropriate distance in all directions utilizing iris scissors. Following this, the designed flap was advanced and carried over into the primary defect and sutured into place. Advancement-Rotation Flap Text: The defect edges were debeveled with a #15 scalpel blade. Given the location of the defect, shape of the defect and the proximity to free margins an advancement-rotation flap was deemed most appropriate. Using a sterile surgical marker, an appropriate flap was drawn incorporating the defect and placing the expected incisions within the relaxed skin tension lines where possible. The area thus outlined was incised deep to adipose tissue with a #15 scalpel blade. The skin margins were undermined to an appropriate distance in all directions utilizing iris scissors. Following this, the designed flap was carried over into the primary defect and sutured into place. Mercedes Flap Text: The defect edges were debeveled with a #15 scalpel blade. Given the location of the defect, shape of the defect and the proximity to free margins a Mercedes flap was deemed most appropriate. Using a sterile surgical marker, an appropriate advancement flap was drawn incorporating the defect and placing the expected incisions within the relaxed skin tension lines where possible. The area thus outlined was incised deep to adipose tissue with a #15 scalpel blade. The skin margins were undermined to an appropriate distance in all directions utilizing iris scissors. Following this, the designed flap was advanced and carried over into the primary defect and sutured into place. Modified Advancement Flap Text: The defect edges were debeveled with a #15 scalpel blade. Given the location of the defect, shape of the defect and the proximity to free margins a modified advancement flap was deemed most appropriate. Using a sterile surgical marker, an appropriate advancement flap was drawn incorporating the defect and placing the expected incisions within the relaxed skin tension lines where possible. The area thus outlined was incised deep to adipose tissue with a #15 scalpel blade. The skin margins were undermined to an appropriate distance in all directions utilizing iris scissors. Following this, the designed flap was advanced and carried over into the primary defect and sutured into place. Mucosal Advancement Flap Text: Given the location of the defect, shape of the defect and the proximity to free margins a mucosal advancement flap was deemed most appropriate. Incisions were made with a 15 blade scalpel in the appropriate fashion along the cutaneous vermilion border and the mucosal lip. The remaining actinically damaged mucosal tissue was excised.  The mucosal advancement flap was then elevated to the gingival sulcus with care taken to preserve the neurovascular structures and advanced into the primary defect. Care was taken to ensure that precise realignment of the vermilion border was achieved. Peng Advancement Flap Text: The defect edges were debeveled with a #15 scalpel blade. Given the location of the defect, shape of the defect and the proximity to free margins a Peng advancement flap was deemed most appropriate. Using a sterile surgical marker, an appropriate advancement flap was drawn incorporating the defect and placing the expected incisions within the relaxed skin tension lines where possible. The area thus outlined was incised deep to adipose tissue with a #15 scalpel blade. The skin margins were undermined to an appropriate distance in all directions utilizing iris scissors. Following this, the designed flap was advanced and carried over into the primary defect and sutured into place. Hatchet Flap Text: The defect edges were debeveled with a #15 scalpel blade. Given the location of the defect, shape of the defect and the proximity to free margins a hatchet flap was deemed most appropriate. Using a sterile surgical marker, an appropriate hatchet flap was drawn incorporating the defect and placing the expected incisions within the relaxed skin tension lines where possible. The area thus outlined was incised deep to adipose tissue with a #15 scalpel blade. The skin margins were undermined to an appropriate distance in all directions utilizing iris scissors. Following this, the designed flap was carried over into the primary defect and sutured into place. Rotation Flap Text: The defect edges were debeveled with a #15 scalpel blade. Given the location of the defect, shape of the defect and the proximity to free margins a rotation flap was deemed most appropriate. Using a sterile surgical marker, an appropriate rotation flap was drawn incorporating the defect and placing the expected incisions within the relaxed skin tension lines where possible. The area thus outlined was incised deep to adipose tissue with a #15 scalpel blade. The skin margins were undermined to an appropriate distance in all directions utilizing iris scissors. Following this, the designed flap was carried over into the primary defect and sutured into place. Bilateral Rotation Flap Text: The defect edges were debeveled with a #15 scalpel blade. Given the location of the defect, shape of the defect and the proximity to free margins a bilateral rotation flap was deemed most appropriate. Using a sterile surgical marker, an appropriate rotation flap was drawn incorporating the defect and placing the expected incisions within the relaxed skin tension lines where possible. The area thus outlined was incised deep to adipose tissue with a #15 scalpel blade. The skin margins were undermined to an appropriate distance in all directions utilizing iris scissors. Following this, the designed flap was carried over into the primary defect and sutured into place. Spiral Flap Text: The defect edges were debeveled with a #15 scalpel blade. Given the location of the defect, shape of the defect and the proximity to free margins a spiral flap was deemed most appropriate. Using a sterile surgical marker, an appropriate rotation flap was drawn incorporating the defect and placing the expected incisions within the relaxed skin tension lines where possible. The area thus outlined was incised deep to adipose tissue with a #15 scalpel blade. The skin margins were undermined to an appropriate distance in all directions utilizing iris scissors. Following this, the designed flap was carried over into the primary defect and sutured into place. Staged Advancement Flap Text: The defect edges were debeveled with a #15 scalpel blade. Given the location of the defect, shape of the defect and the proximity to free margins a staged advancement flap was deemed most appropriate. Using a sterile surgical marker, an appropriate advancement flap was drawn incorporating the defect and placing the expected incisions within the relaxed skin tension lines where possible. The area thus outlined was incised deep to adipose tissue with a #15 scalpel blade. The skin margins were undermined to an appropriate distance in all directions utilizing iris scissors. Following this, the designed flap was carried over into the primary defect and sutured into place. Star Wedge Flap Text: The defect edges were debeveled with a #15 scalpel blade. Given the location of the defect, shape of the defect and the proximity to free margins a star wedge flap was deemed most appropriate. Using a sterile surgical marker, an appropriate rotation flap was drawn incorporating the defect and placing the expected incisions within the relaxed skin tension lines where possible. The area thus outlined was incised deep to adipose tissue with a #15 scalpel blade. The skin margins were undermined to an appropriate distance in all directions utilizing iris scissors. Following this, the designed flap was carried over into the primary defect and sutured into place. Transposition Flap Text: The defect edges were debeveled with a #15 scalpel blade. Given the location of the defect and the proximity to free margins a transposition flap was deemed most appropriate. Using a sterile surgical marker, an appropriate transposition flap was drawn incorporating the defect. The area thus outlined was incised deep to adipose tissue with a #15 scalpel blade. The skin margins were undermined to an appropriate distance in all directions utilizing iris scissors. Following this, the designed flap was carried over into the primary defect and sutured into place. Muscle Hinge Flap Text: The defect edges were debeveled with a #15 scalpel blade.  Given the size, depth and location of the defect and the proximity to free margins a muscle hinge flap was deemed most appropriate. Using a sterile surgical marker, an appropriate hinge flap was drawn incorporating the defect. The area thus outlined was incised with a #15 scalpel blade. The skin margins were undermined to an appropriate distance in all directions utilizing iris scissors. Following this, the designed flap was carried into the primary defect and sutured into place. Mustarde Flap Text: The defect edges were debeveled with a #15 scalpel blade.  Given the size, depth and location of the defect and the proximity to free margins a Mustarde flap was deemed most appropriate. Using a sterile surgical marker, an appropriate flap was drawn incorporating the defect. The area thus outlined was incised with a #15 scalpel blade. The skin margins were undermined to an appropriate distance in all directions utilizing iris scissors. Following this, the designed flap was carried into the primary defect and sutured into place. Nasal Turnover Hinge Flap Text: The defect edges were debeveled with a #15 scalpel blade.  Given the size, depth, location of the defect and the defect being full thickness a nasal turnover hinge flap was deemed most appropriate. Using a sterile surgical marker, an appropriate hinge flap was drawn incorporating the defect. The area thus outlined was incised with a #15 scalpel blade. The flap was designed to recreate the nasal mucosal lining and the alar rim. The skin margins were undermined to an appropriate distance in all directions utilizing iris scissors. Following this, the designed flap was carried over into the primary defect and sutured into place Nasalis-Muscle-Based Myocutaneous Island Pedicle Flap Text: Using a #15 blade, an incision was made around the donor flap to the level of the nasalis muscle. Wide lateral undermining was then performed in both the subcutaneous plane above the nasalis muscle, and in a submuscular plane just above periosteum. This allowed the formation of a free nasalis muscle axial pedicle (based on the angular artery) which was still attached to the actual cutaneous flap, increasing its mobility and vascular viability. Hemostasis was obtained with pinpoint electrocoagulation. The flap was mobilized into position and the pivotal anchor points positioned and stabilized with buried interrupted sutures. Subcutaneous and dermal tissues were closed in a multilayered fashion with sutures. Tissue redundancies were excised, and the epidermal edges were apposed without significant tension and sutured with sutures. Nasalis Myocutaneous Flap Text: Using a #15 blade, an incision was made around the donor flap to the level of the nasalis muscle. Wide lateral undermining was then performed in both the subcutaneous plane above the nasalis muscle, and in a submuscular plane just above periosteum. This allowed the formation of a free nasalis muscle axial pedicle which was still attached to the actual cutaneous flap, increasing its mobility and vascular viability. Hemostasis was obtained with pinpoint electrocoagulation. The flap was mobilized into position and the pivotal anchor points positioned and stabilized with buried interrupted sutures. Subcutaneous and dermal tissues were closed in a multilayered fashion with sutures. Tissue redundancies were excised, and the epidermal edges were apposed without significant tension and sutured with sutures. Nasolabial Transposition Flap Text: The defect edges were debeveled with a #15 scalpel blade.  Given the size, depth and location of the defect and the proximity to free margins a nasolabial transposition flap was deemed most appropriate. Using a sterile surgical marker, an appropriate flap was drawn incorporating the defect. The area thus outlined was incised with a #15 scalpel blade. The skin margins were undermined to an appropriate distance in all directions utilizing iris scissors. Following this, the designed flap was carried into the primary defect and sutured into place. Orbicularis Oris Muscle Flap Text: The defect edges were debeveled with a #15 scalpel blade.  Given that the defect affected the competency of the oral sphincter an orbicularis oris muscle flap was deemed most appropriate to restore this competency and normal muscle function.  Using a sterile surgical marker, an appropriate flap was drawn incorporating the defect. The area thus outlined was incised with a #15 scalpel blade. Following this, the designed flap was carried over into the primary defect and sutured into place. Melolabial Transposition Flap Text: The defect edges were debeveled with a #15 scalpel blade. Given the location of the defect and the proximity to free margins a melolabial flap was deemed most appropriate. Using a sterile surgical marker, an appropriate melolabial transposition flap was drawn incorporating the defect. The area thus outlined was incised deep to adipose tissue with a #15 scalpel blade. The skin margins were undermined to an appropriate distance in all directions utilizing iris scissors. Following this, the designed flap was carried over into the primary defect and sutured into place. Rectangular Flap Text: The defect edges were debeveled with a #15 scalpel blade. Given the location of the defect and the proximity to free margins a rectangular flap was deemed most appropriate. Using a sterile surgical marker, an appropriate rectangular flap was drawn incorporating the defect. The area thus outlined was incised deep to adipose tissue with a #15 scalpel blade. The skin margins were undermined to an appropriate distance in all directions utilizing iris scissors. Following this, the designed flap was carried over into the primary defect and sutured into place. Rhombic Flap Text: The defect edges were debeveled with a #15 scalpel blade. Given the location of the defect and the proximity to free margins a rhombic flap was deemed most appropriate. Using a sterile surgical marker, an appropriate rhombic flap was drawn incorporating the defect. The area thus outlined was incised deep to adipose tissue with a #15 scalpel blade. The skin margins were undermined to an appropriate distance in all directions utilizing iris scissors. Following this, the designed flap was carried over into the primary defect and sutured into place. Rhomboid Transposition Flap Text: The defect edges were debeveled with a #15 scalpel blade. Given the location of the defect and the proximity to free margins a rhomboid transposition flap was deemed most appropriate. Using a sterile surgical marker, an appropriate rhomboid flap was drawn incorporating the defect. The area thus outlined was incised deep to adipose tissue with a #15 scalpel blade. The skin margins were undermined to an appropriate distance in all directions utilizing iris scissors. Following this, the designed flap was carried over into the primary defect and sutured into place. Bi-Rhombic Flap Text: The defect edges were debeveled with a #15 scalpel blade. Given the location of the defect and the proximity to free margins a bi-rhombic flap was deemed most appropriate. Using a sterile surgical marker, an appropriate rhombic flap was drawn incorporating the defect. The area thus outlined was incised deep to adipose tissue with a #15 scalpel blade. The skin margins were undermined to an appropriate distance in all directions utilizing iris scissors. Following this, the designed flap was carried over into the primary defect and sutured into place. Helical Rim Advancement Flap Text: The defect edges were debeveled with a #15 blade scalpel.  Given the location of the defect and the proximity to free margins (helical rim) a double helical rim advancement flap was deemed most appropriate. Using a sterile surgical marker, the appropriate advancement flaps were drawn incorporating the defect and placing the expected incisions between the helical rim and antihelix where possible.  The area thus outlined was incised through and through with a #15 scalpel blade.  With a skin hook and iris scissors, the flaps were gently and sharply undermined and freed up. Folllowing this, the designed flaps were carried over into the primary defect and sutured into place. Bilateral Helical Rim Advancement Flap Text: The defect edges were debeveled with a #15 blade scalpel.  Given the location of the defect and the proximity to free margins (helical rim) a bilateral helical rim advancement flap was deemed most appropriate. Using a sterile surgical marker, the appropriate advancement flaps were drawn incorporating the defect and placing the expected incisions between the helical rim and antihelix where possible.  The area thus outlined was incised through and through with a #15 scalpel blade.  With a skin hook and iris scissors, the flaps were gently and sharply undermined and freed up. Following this, the designed flaps were placed into the primary defect and sutured into place. Ear Star Wedge Flap Text: The defect edges were debeveled with a #15 blade scalpel.  Given the location of the defect and the proximity to free margins (helical rim) an ear star wedge flap was deemed most appropriate. Using a sterile surgical marker, the appropriate flap was drawn incorporating the defect and placing the expected incisions between the helical rim and antihelix where possible.  The area thus outlined was incised through and through with a #15 scalpel blade. Following this, the designed flap was carried over into the primary defect and sutured into place. Flip-Flop Flap Text: The defect edges were debeveled with a #15 blade scalpel.  Given the location of the defect and the proximity to free margins a flip-flop flap was deemed most appropriate. Using a sterile surgical marker, the appropriate flap was drawn incorporating the defect and placing the expected incisions between the helical rim and antihelix where possible.  The area thus outlined was incised through and through with a #15 scalpel blade. Following this, the designed flap was carried over into the primary defect and sutured into place. Banner Transposition Flap Text: The defect edges were debeveled with a #15 scalpel blade. Given the location of the defect and the proximity to free margins a Banner transposition flap was deemed most appropriate. Using a sterile surgical marker, an appropriate flap was drawn around the defect. The area thus outlined was incised deep to adipose tissue with a #15 scalpel blade. The skin margins were undermined to an appropriate distance in all directions utilizing iris scissors. Following this, the designed flap was carried into the primary defect and sutured into place. Bilobed Flap Text: The defect edges were debeveled with a #15 scalpel blade. Given the location of the defect and the proximity to free margins a bilobe flap was deemed most appropriate. Using a sterile surgical marker, an appropriate bilobe flap drawn around the defect. The area thus outlined was incised deep to adipose tissue with a #15 scalpel blade. The skin margins were undermined to an appropriate distance in all directions utilizing iris scissors. Following this, the designed flap was carried over into the primary defect and sutured into place. Bilobed Transposition Flap Text: The defect edges were debeveled with a #15 scalpel blade. Given the location of the defect and the proximity to free margins a bilobed transposition flap was deemed most appropriate. Using a sterile surgical marker, an appropriate bilobe flap drawn around the defect. The area thus outlined was incised deep to adipose tissue with a #15 scalpel blade. The skin margins were undermined to an appropriate distance in all directions utilizing iris scissors. Following this, the designed flap was carried over into the primary defect and sutured into place. Trilobed Flap Text: The defect edges were debeveled with a #15 scalpel blade. Given the location of the defect and the proximity to free margins a trilobed flap was deemed most appropriate. Using a sterile surgical marker, an appropriate trilobed flap was drawn around the defect. The area thus outlined was incised deep to adipose tissue with a #15 scalpel blade. The skin margins were undermined to an appropriate distance in all directions utilizing iris scissors. Following this, the designed flap was carried into the primary defect and sutured into place. Dorsal Nasal Flap Text: The defect edges were debeveled with a #15 scalpel blade. Given the location of the defect and the proximity to free margins a dorsal nasal flap was deemed most appropriate. Using a sterile surgical marker, an appropriate dorsal nasal flap was drawn around the defect. The area thus outlined was incised deep to adipose tissue with a #15 scalpel blade. The skin margins were undermined to an appropriate distance in all directions utilizing iris scissors. Following this, the designed flap was carried into the primary defect and sutured into place. Island Pedicle Flap Text: The defect edges were debeveled with a #15 scalpel blade. Given the location of the defect, shape of the defect and the proximity to free margins an island pedicle advancement flap was deemed most appropriate. Using a sterile surgical marker, an appropriate advancement flap was drawn incorporating the defect, outlining the appropriate donor tissue and placing the expected incisions within the relaxed skin tension lines where possible. The area thus outlined was incised deep to adipose tissue with a #15 scalpel blade. The skin margins were undermined to an appropriate distance in all directions around the primary defect and laterally outward around the island pedicle utilizing iris scissors.  There was minimal undermining beneath the pedicle flap. Following this, the flap was carried over into the primary defect and sutured into place. Island Pedicle Flap With Canthal Suspension Text: The defect edges were debeveled with a #15 scalpel blade. Given the location of the defect, shape of the defect and the proximity to free margins an island pedicle advancement flap was deemed most appropriate. Using a sterile surgical marker, an appropriate advancement flap was drawn incorporating the defect, outlining the appropriate donor tissue and placing the expected incisions within the relaxed skin tension lines where possible. The area thus outlined was incised deep to adipose tissue with a #15 scalpel blade. The skin margins were undermined to an appropriate distance in all directions around the primary defect and laterally outward around the island pedicle utilizing iris scissors.  There was minimal undermining beneath the pedicle flap. A suspension suture was placed in the canthal tendon to prevent tension and prevent ectropion. Following this, the designed flap was placed into the primary defect and sutured into place. Alar Island Pedicle Flap Text: The defect edges were debeveled with a #15 scalpel blade. Given the location of the defect, shape of the defect and the proximity to the alar rim an island pedicle advancement flap was deemed most appropriate. Using a sterile surgical marker, an appropriate advancement flap was drawn incorporating the defect, outlining the appropriate donor tissue and placing the expected incisions within the nasal ala running parallel to the alar rim. The area thus outlined was incised with a #15 scalpel blade. The skin margins were undermined minimally to an appropriate distance in all directions around the primary defect and laterally outward around the island pedicle utilizing iris scissors.  There was minimal undermining beneath the pedicle flap. Following this, the designed flap was carried over into the primary defect and sutured into place. Double Island Pedicle Flap Text: The defect edges were debeveled with a #15 scalpel blade. Given the location of the defect, shape of the defect and the proximity to free margins a double island pedicle advancement flap was deemed most appropriate. Using a sterile surgical marker, an appropriate advancement flap was drawn incorporating the defect, outlining the appropriate donor tissue and placing the expected incisions within the relaxed skin tension lines where possible. The area thus outlined was incised deep to adipose tissue with a #15 scalpel blade. The skin margins were undermined to an appropriate distance in all directions around the primary defect and laterally outward around the island pedicle utilizing iris scissors.  There was minimal undermining beneath the pedicle flap. Following this, the flap was carried over into the primary defect and sutured into place. Island Pedicle Flap-Requiring Vessel Identification Text: The defect edges were debeveled with a #15 scalpel blade. Given the location of the defect, shape of the defect and the proximity to free margins an island pedicle advancement flap was deemed most appropriate. Using a sterile surgical marker, an appropriate advancement flap was drawn, based on the axial vessel mentioned above, incorporating the defect, outlining the appropriate donor tissue and placing the expected incisions within the relaxed skin tension lines where possible. The area thus outlined was incised deep to adipose tissue with a #15 scalpel blade. The skin margins were undermined to an appropriate distance in all directions around the primary defect and laterally outward around the island pedicle utilizing iris scissors.  There was minimal undermining beneath the pedicle flap. Following this, the designed flap was carried over into the primary defect and sutured into place. Keystone Flap Text: The defect edges were debeveled with a #15 scalpel blade. Given the location of the defect, shape of the defect a keystone flap was deemed most appropriate. Using a sterile surgical marker, an appropriate keystone flap was drawn incorporating the defect, outlining the appropriate donor tissue and placing the expected incisions within the relaxed skin tension lines where possible. The area thus outlined was incised deep to adipose tissue with a #15 scalpel blade. The skin margins were undermined to an appropriate distance in all directions around the primary defect and laterally outward around the flap utilizing iris scissors. Following this, the designed flap was carried into the primary defect and sutured into place. O-T Plasty Text: The defect edges were debeveled with a #15 scalpel blade. Given the location of the defect, shape of the defect and the proximity to free margins an O-T plasty was deemed most appropriate. Using a sterile surgical marker, an appropriate O-T plasty was drawn incorporating the defect and placing the expected incisions within the relaxed skin tension lines where possible. The area thus outlined was incised deep to adipose tissue with a #15 scalpel blade. The skin margins were undermined to an appropriate distance in all directions utilizing iris scissors. Following this, the designed flap was carried over into the primary defect and sutured into place. O-Z Plasty Text: The defect edges were debeveled with a #15 scalpel blade. Given the location of the defect, shape of the defect and the proximity to free margins an O-Z plasty (double transposition flap) was deemed most appropriate. Using a sterile surgical marker, the appropriate transposition flaps were drawn incorporating the defect and placing the expected incisions within the relaxed skin tension lines where possible. The area thus outlined was incised deep to adipose tissue with a #15 scalpel blade. The skin margins were undermined to an appropriate distance in all directions utilizing iris scissors. Hemostasis was achieved with electrocautery. The flaps were then transposed and carried over into place, one clockwise and the other counterclockwise, and anchored with interrupted buried subcutaneous sutures. Double O-Z Plasty Text: The defect edges were debeveled with a #15 scalpel blade. Given the location of the defect, shape of the defect and the proximity to free margins a Double O-Z plasty (double transposition flap) was deemed most appropriate. Using a sterile surgical marker, the appropriate transposition flaps were drawn incorporating the defect and placing the expected incisions within the relaxed skin tension lines where possible. The area thus outlined was incised deep to adipose tissue with a #15 scalpel blade. The skin margins were undermined to an appropriate distance in all directions utilizing iris scissors. Hemostasis was achieved with electrocautery. The flaps were then transposed and carried over into place, one clockwise and the other counterclockwise, and anchored with interrupted buried subcutaneous sutures. V-Y Plasty Text: The defect edges were debeveled with a #15 scalpel blade. Given the location of the defect, shape of the defect and the proximity to free margins an V-Y advancement flap was deemed most appropriate. Using a sterile surgical marker, an appropriate advancement flap was drawn incorporating the defect and placing the expected incisions within the relaxed skin tension lines where possible. The area thus outlined was incised deep to adipose tissue with a #15 scalpel blade. The skin margins were undermined to an appropriate distance in all directions utilizing iris scissors. Following this, the designed flap was advanced and carried over into the primary defect and sutured into place. H Plasty Text: Given the location of the defect, shape of the defect and the proximity to free margins a H-plasty was deemed most appropriate for repair. Using a sterile surgical marker, the appropriate advancement arms of the H-plasty were drawn incorporating the defect and placing the expected incisions within the relaxed skin tension lines where possible. The area thus outlined was incised deep to adipose tissue with a #15 scalpel blade. The skin margins were undermined to an appropriate distance in all directions utilizing iris scissors.  The opposing advancement arms were then advanced and carried over into place in opposite direction and anchored with interrupted buried subcutaneous sutures. W Plasty Text: The lesion was extirpated to the level of the fat with a #15 scalpel blade. Given the location of the defect, shape of the defect and the proximity to free margins a W-plasty was deemed most appropriate for repair. Using a sterile surgical marker, the appropriate transposition arms of the W-plasty were drawn incorporating the defect and placing the expected incisions within the relaxed skin tension lines where possible. The area thus outlined was incised deep to adipose tissue with a #15 scalpel blade. The skin margins were undermined to an appropriate distance in all directions utilizing iris scissors. The opposing transposition arms were then transposed and carried over into place in opposite direction and anchored with interrupted buried subcutaneous sutures. Z Plasty Text: The lesion was extirpated to the level of the fat with a #15 scalpel blade. Given the location of the defect, shape of the defect and the proximity to free margins a Z-plasty was deemed most appropriate for repair. Using a sterile surgical marker, the appropriate transposition arms of the Z-plasty were drawn incorporating the defect and placing the expected incisions within the relaxed skin tension lines where possible. The area thus outlined was incised deep to adipose tissue with a #15 scalpel blade. The skin margins were undermined to an appropriate distance in all directions utilizing iris scissors. The opposing transposition arms were then transposed and carried over into place in opposite direction and anchored with interrupted buried subcutaneous sutures. Double Z Plasty Text: The lesion was extirpated to the level of the fat with a #15 scalpel blade. Given the location of the defect, shape of the defect and the proximity to free margins a double Z-plasty was deemed most appropriate for repair. Using a sterile surgical marker, the appropriate transposition arms of the double Z-plasty were drawn incorporating the defect and placing the expected incisions within the relaxed skin tension lines where possible. The area thus outlined was incised deep to adipose tissue with a #15 scalpel blade. The skin margins were undermined to an appropriate distance in all directions utilizing iris scissors. The opposing transposition arms were then transposed and carried over into place in opposite direction and anchored with interrupted buried subcutaneous sutures. Zygomaticofacial Flap Text: Given the location of the defect, shape of the defect and the proximity to free margins a zygomaticofacial flap was deemed most appropriate for repair. Using a sterile surgical marker, the appropriate flap was drawn incorporating the defect and placing the expected incisions within the relaxed skin tension lines where possible. The area thus outlined was incised deep to adipose tissue with a #15 scalpel blade with preservation of a vascular pedicle.  The skin margins were undermined to an appropriate distance in all directions utilizing iris scissors. The flap was then carried over into the defect and anchored with interrupted buried subcutaneous sutures. Cheek Interpolation Flap Text: A decision was made to reconstruct the defect utilizing an interpolation axial flap and a staged reconstruction.  A telfa template was made of the defect.  This telfa template was then used to outline the Cheek Interpolation flap.  The donor area for the pedicle flap was then injected with anesthesia.  The flap was excised through the skin and subcutaneous tissue down to the layer of the underlying musculature.  The interpolation flap was carefully excised within this deep plane to maintain its blood supply.  The edges of the donor site were undermined.   The donor site was closed in a primary fashion.  The pedicle was then rotated into position and sutured.  Once the tube was sutured into place, adequate blood supply was confirmed with blanching and refill.  The pedicle was then wrapped with xeroform gauze and dressed appropriately with a telfa and gauze bandage to ensure continued blood supply and protect the attached pedicle. Cheek-To-Nose Interpolation Flap Text: A decision was made to reconstruct the defect utilizing an interpolation axial flap and a staged reconstruction.  A telfa template was made of the defect.  This telfa template was then used to outline the Cheek-To-Nose Interpolation flap.  The donor area for the pedicle flap was then injected with anesthesia.  The flap was excised through the skin and subcutaneous tissue down to the layer of the underlying musculature.  The interpolation flap was carefully excised within this deep plane to maintain its blood supply.  The edges of the donor site were undermined.   The donor site was closed in a primary fashion.  The pedicle was then rotated into position and sutured.  Once the tube was sutured into place, adequate blood supply was confirmed with blanching and refill.  The pedicle was then wrapped with xeroform gauze and dressed appropriately with a telfa and gauze bandage to ensure continued blood supply and protect the attached pedicle. Interpolation Flap Text: A decision was made to reconstruct the defect utilizing an interpolation axial flap and a staged reconstruction.  A telfa template was made of the defect.  This telfa template was then used to outline the interpolation flap.  The donor area for the pedicle flap was then injected with anesthesia.  The flap was excised through the skin and subcutaneous tissue down to the layer of the underlying musculature.  The interpolation flap was carefully excised within this deep plane to maintain its blood supply.  The edges of the donor site were undermined.   The donor site was closed in a primary fashion.  The pedicle was then rotated into position and sutured.  Once the tube was sutured into place, adequate blood supply was confirmed with blanching and refill.  The pedicle was then wrapped with xeroform gauze and dressed appropriately with a telfa and gauze bandage to ensure continued blood supply and protect the attached pedicle. Melolabial Interpolation Flap Text: A decision was made to reconstruct the defect utilizing an interpolation axial flap and a staged reconstruction.  A telfa template was made of the defect.  This telfa template was then used to outline the melolabial interpolation flap.  The donor area for the pedicle flap was then injected with anesthesia.  The flap was excised through the skin and subcutaneous tissue down to the layer of the underlying musculature.  The pedicle flap was carefully excised within this deep plane to maintain its blood supply.  The edges of the donor site were undermined.   The donor site was closed in a primary fashion.  The pedicle was then rotated into position and sutured.  Once the tube was sutured into place, adequate blood supply was confirmed with blanching and refill.  The pedicle was then wrapped with xeroform gauze and dressed appropriately with a telfa and gauze bandage to ensure continued blood supply and protect the attached pedicle. Mastoid Interpolation Flap Text: A decision was made to reconstruct the defect utilizing an interpolation axial flap and a staged reconstruction.  A telfa template was made of the defect.  This telfa template was then used to outline the mastoid interpolation flap.  The donor area for the pedicle flap was then injected with anesthesia.  The flap was excised through the skin and subcutaneous tissue down to the layer of the underlying musculature.  The pedicle flap was carefully excised within this deep plane to maintain its blood supply.  The edges of the donor site were undermined.   The donor site was closed in a primary fashion.  The pedicle was then rotated into position and sutured.  Once the tube was sutured into place, adequate blood supply was confirmed with blanching and refill.  The pedicle was then wrapped with xeroform gauze and dressed appropriately with a telfa and gauze bandage to ensure continued blood supply and protect the attached pedicle. Posterior Auricular Interpolation Flap Text: A decision was made to reconstruct the defect utilizing an interpolation axial flap and a staged reconstruction.  A telfa template was made of the defect.  This telfa template was then used to outline the posterior auricular interpolation flap.  The donor area for the pedicle flap was then injected with anesthesia.  The flap was excised through the skin and subcutaneous tissue down to the layer of the underlying musculature.  The pedicle flap was carefully excised within this deep plane to maintain its blood supply.  The edges of the donor site were undermined.   The donor site was closed in a primary fashion.  The pedicle was then rotated into position and sutured.  Once the tube was sutured into place, adequate blood supply was confirmed with blanching and refill.  The pedicle was then wrapped with xeroform gauze and dressed appropriately with a telfa and gauze bandage to ensure continued blood supply and protect the attached pedicle. Paramedian Forehead Flap Text: A decision was made to reconstruct the defect utilizing an interpolation axial flap and a staged reconstruction.  A telfa template was made of the defect.  This telfa template was then used to outline the paramedian forehead pedicle flap.  The donor area for the pedicle flap was then injected with anesthesia.  The flap was excised through the skin and subcutaneous tissue down to the layer of the underlying musculature.  The pedicle flap was carefully excised within this deep plane to maintain its blood supply.  The edges of the donor site were undermined.   The donor site was closed in a primary fashion.  The pedicle was then rotated into position and sutured.  Once the tube was sutured into place, adequate blood supply was confirmed with blanching and refill.  The pedicle was then wrapped with xeroform gauze and dressed appropriately with a telfa and gauze bandage to ensure continued blood supply and protect the attached pedicle. Abbe Flap (Upper To Lower Lip) Text: The defect of the lower lip was assessed and measured.  Given the location and size of the defect, an Abbe flap was deemed most appropriate. Using a sterile surgical marker, an appropriate Abbe flap was measured and drawn on the upper lip. Local anesthesia was then infiltrated.  A scalpel was then used to incise the upper lip through and through the skin, vermilion, muscle and mucosa, leaving the flap pedicled on the opposite side.  The flap was then rotated and transferred to the lower lip defect.  The flap was then sutured into place with a three layer technique, closing the orbicularis oris muscle layer with subcutaneous buried sutures, followed by a mucosal layer and an epidermal layer. Abbe Flap (Lower To Upper Lip) Text: The defect of the upper lip was assessed and measured.  Given the location and size of the defect, an Abbe flap was deemed most appropriate. Using a sterile surgical marker, an appropriate Abbe flap was measured and drawn on the lower lip. Local anesthesia was then infiltrated. A scalpel was then used to incise the upper lip through and through the skin, vermilion, muscle and mucosa, leaving the flap pedicled on the opposite side.  The flap was then rotated and transferred to the lower lip defect.  The flap was then sutured into place with a three layer technique, closing the orbicularis oris muscle layer with subcutaneous buried sutures, followed by a mucosal layer and an epidermal layer. Estlander Flap (Upper To Lower Lip) Text: The defect of the lower lip was assessed and measured.  Given the location and size of the defect, an Estlander flap was deemed most appropriate. Using a sterile surgical marker, an appropriate Estlander flap was measured and drawn on the upper lip. Local anesthesia was then infiltrated. A scalpel was then used to incise the lateral aspect of the flap, through skin, muscle and mucosa, leaving the flap pedicled medially.  The flap was then rotated and positioned to fill the lower lip defect.  The flap was then sutured into place with a three layer technique, closing the orbicularis oris muscle layer with subcutaneous buried sutures, followed by a mucosal layer and an epidermal layer. Lip Wedge Excision Repair Text: Given the location of the defect and the proximity to free margins a full thickness wedge repair was deemed most appropriate. Using a sterile surgical marker, the appropriate repair was drawn incorporating the defect and placing the expected incisions perpendicular to the vermilion border.  The vermilion border was also meticulously outlined to ensure appropriate reapproximation during the repair.  The area thus outlined was incised through and through with a #15 scalpel blade.  The muscularis and dermis were reaproximated with deep sutures following hemostasis. Care was taken to realign the vermilion border before proceeding with the superficial closure.  Once the vermilion was realigned the superfical and mucosal closure was finished. Ftsg Text: The defect edges were debeveled with a #15 scalpel blade. Given the location of the defect, shape of the defect and the proximity to free margins a full thickness skin graft was deemed most appropriate. Using a sterile surgical marker, the primary defect shape was transferred to the donor site. The area thus outlined was incised deep to adipose tissue with a #15 scalpel blade.  The harvested graft was then trimmed of adipose tissue until only dermis and epidermis was left.  The skin graft was then placed in the primary defect and oriented appropriately. Split-Thickness Skin Graft Text: The defect edges were debeveled with a #15 scalpel blade. Given the location of the defect, shape of the defect and the proximity to free margins a split thickness skin graft was deemed most appropriate. Using a sterile surgical marker, the primary defect shape was transferred to the donor site. The split thickness graft was then harvested.  The skin graft was then placed in the primary defect and oriented appropriately. Pinch Graft Text: The defect edges were debeveled with a #15 scalpel blade. Given the location of the defect, shape of the defect and the proximity to free margins a pinch graft was deemed most appropriate. Using a sterile surgical marker, the primary defect shape was transferred to the donor site. The area thus outlined was incised deep to adipose tissue with a #15 scalpel blade.  The harvested graft was then trimmed of adipose tissue until only dermis and epidermis was left. The skin graft was then placed in the primary defect and oriented appropriately. Burow's Graft Text: The defect edges were debeveled with a #15 scalpel blade. Given the location of the defect, shape of the defect, the proximity to free margins and the presence of a standing cone deformity a Burow's skin graft was deemed most appropriate. The standing cone was removed and this tissue was then trimmed to the shape of the primary defect. The adipose tissue was also removed until only dermis and epidermis were left.  The skin graft was then placed in the primary defect and oriented appropriately. Cartilage Graft Text: The defect edges were debeveled with a #15 scalpel blade. Given the location of the defect, shape of the defect, the fact the defect involved a full thickness cartilage defect a cartilage graft was deemed most appropriate.  An appropriate donor site was identified, cleansed, and anesthetized. The cartilage graft was then harvested and transferred to the recipient site, oriented appropriately and then sutured into place.  The secondary defect was then repaired using a primary closure. Composite Graft Text: The defect edges were debeveled with a #15 scalpel blade. Given the location of the defect, shape of the defect, the proximity to free margins and the fact the defect was full thickness a composite graft was deemed most appropriate.  The defect was outline and then transferred to the donor site.  A full thickness graft was then excised from the donor site. The graft was then placed in the primary defect, oriented appropriately and then sutured into place.  The secondary defect was then repaired using a primary closure. Epidermal Autograft Text: The defect edges were debeveled with a #15 scalpel blade. Given the location of the defect, shape of the defect and the proximity to free margins an epidermal autograft was deemed most appropriate. Using a sterile surgical marker, the primary defect shape was transferred to the donor site. The epidermal graft was then harvested.  The skin graft was then placed in the primary defect and oriented appropriately. Dermal Autograft Text: The defect edges were debeveled with a #15 scalpel blade. Given the location of the defect, shape of the defect and the proximity to free margins a dermal autograft was deemed most appropriate. Using a sterile surgical marker, the primary defect shape was transferred to the donor site. The area thus outlined was incised deep to adipose tissue with a #15 scalpel blade.  The harvested graft was then trimmed of adipose and epidermal tissue until only dermis was left.  The skin graft was then placed in the primary defect and oriented appropriately. Skin Substitute Text: The defect edges were debeveled with a #15 scalpel blade. Given the location of the defect, shape of the defect and the proximity to free margins a skin substitute graft was deemed most appropriate.  The graft material was trimmed to fit the size of the defect. The graft was then placed in the primary defect and oriented appropriately. Tissue Cultured Epidermal Autograft Text: The defect edges were debeveled with a #15 scalpel blade. Given the location of the defect, shape of the defect and the proximity to free margins a tissue cultured epidermal autograft was deemed most appropriate.  The graft was then trimmed to fit the size of the defect.  The graft was then placed in the primary defect and oriented appropriately. Xenograft Text: The defect edges were debeveled with a #15 scalpel blade. Given the location of the defect, shape of the defect and the proximity to free margins a xenograft was deemed most appropriate.  The graft was then trimmed to fit the size of the defect.  The graft was then placed in the primary defect and oriented appropriately. Purse String (Intermediate) Text: Given the location of the defect and the characteristics of the surrounding skin a purse string intermediate closure was deemed most appropriate.  Undermining was performed circumferentially around the surgical defect.  A purse string suture was then placed and tightened. Purse String (Simple) Text: Given the location of the defect and the characteristics of the surrounding skin a purse string simple closure was deemed most appropriate.  Undermining was performed circumferentially around the surgical defect.  A purse string suture was then placed and tightened. Partial Purse String (Intermediate) Text: Given the location of the defect and the characteristics of the surrounding skin an intermediate purse string closure was deemed most appropriate.  Undermining was performed circumferentially around the surgical defect.  A purse string suture was then placed and tightened. Wound tension of the circular defect prevented complete closure of the wound. Partial Purse String (Simple) Text: Given the location of the defect and the characteristics of the surrounding skin a simple purse string closure was deemed most appropriate.  Undermining was performed circumferentially around the surgical defect.  A purse string suture was then placed and tightened. Wound tension of the circular defect prevented complete closure of the wound. Complex Repair And Single Advancement Flap Text: The defect edges were debeveled with a #15 scalpel blade.  The primary defect was closed partially with a complex linear closure.  Given the location of the remaining defect, shape of the defect and the proximity to free margins a single advancement flap was deemed most appropriate for complete closure of the defect.  Using a sterile surgical marker, an appropriate advancement flap was drawn incorporating the defect and placing the expected incisions within the relaxed skin tension lines where possible. The area thus outlined was incised deep to adipose tissue with a #15 scalpel blade. The skin margins were undermined to an appropriate distance in all directions utilizing iris scissors and carried over to close the primary defect. Complex Repair And Double Advancement Flap Text: The defect edges were debeveled with a #15 scalpel blade.  The primary defect was closed partially with a complex linear closure.  Given the location of the remaining defect, shape of the defect and the proximity to free margins a double advancement flap was deemed most appropriate for complete closure of the defect.  Using a sterile surgical marker, an appropriate advancement flap was drawn incorporating the defect and placing the expected incisions within the relaxed skin tension lines where possible. The area thus outlined was incised deep to adipose tissue with a #15 scalpel blade. The skin margins were undermined to an appropriate distance in all directions utilizing iris scissors and carried over to close the primary defect. Complex Repair And Modified Advancement Flap Text: The defect edges were debeveled with a #15 scalpel blade.  The primary defect was closed partially with a complex linear closure.  Given the location of the remaining defect, shape of the defect and the proximity to free margins a modified advancement flap was deemed most appropriate for complete closure of the defect.  Using a sterile surgical marker, an appropriate advancement flap was drawn incorporating the defect and placing the expected incisions within the relaxed skin tension lines where possible. The area thus outlined was incised deep to adipose tissue with a #15 scalpel blade. The skin margins were undermined to an appropriate distance in all directions utilizing iris scissors and carried over to close the primary defect. Complex Repair And A-T Advancement Flap Text: The defect edges were debeveled with a #15 scalpel blade.  The primary defect was closed partially with a complex linear closure.  Given the location of the remaining defect, shape of the defect and the proximity to free margins an A-T advancement flap was deemed most appropriate for complete closure of the defect.  Using a sterile surgical marker, an appropriate advancement flap was drawn incorporating the defect and placing the expected incisions within the relaxed skin tension lines where possible. The area thus outlined was incised deep to adipose tissue with a #15 scalpel blade. The skin margins were undermined to an appropriate distance in all directions utilizing iris scissors and carried over to close the primary defect. Complex Repair And O-T Advancement Flap Text: The defect edges were debeveled with a #15 scalpel blade.  The primary defect was closed partially with a complex linear closure.  Given the location of the remaining defect, shape of the defect and the proximity to free margins an O-T advancement flap was deemed most appropriate for complete closure of the defect.  Using a sterile surgical marker, an appropriate advancement flap was drawn incorporating the defect and placing the expected incisions within the relaxed skin tension lines where possible. The area thus outlined was incised deep to adipose tissue with a #15 scalpel blade. The skin margins were undermined to an appropriate distance in all directions utilizing iris scissors and carried over to close the primary defect. Complex Repair And O-L Flap Text: The defect edges were debeveled with a #15 scalpel blade.  The primary defect was closed partially with a complex linear closure.  Given the location of the remaining defect, shape of the defect and the proximity to free margins an O-L flap was deemed most appropriate for complete closure of the defect.  Using a sterile surgical marker, an appropriate flap was drawn incorporating the defect and placing the expected incisions within the relaxed skin tension lines where possible. The area thus outlined was incised deep to adipose tissue with a #15 scalpel blade. The skin margins were undermined to an appropriate distance in all directions utilizing iris scissors and carried over to close the primary defect. Complex Repair And Bilobe Flap Text: The defect edges were debeveled with a #15 scalpel blade.  The primary defect was closed partially with a complex linear closure.  Given the location of the remaining defect, shape of the defect and the proximity to free margins a bilobe flap was deemed most appropriate for complete closure of the defect.  Using a sterile surgical marker, an appropriate advancement flap was drawn incorporating the defect and placing the expected incisions within the relaxed skin tension lines where possible. The area thus outlined was incised deep to adipose tissue with a #15 scalpel blade. The skin margins were undermined to an appropriate distance in all directions utilizing iris scissors and carried over to close the primary defect. Complex Repair And Melolabial Flap Text: The defect edges were debeveled with a #15 scalpel blade.  The primary defect was closed partially with a complex linear closure.  Given the location of the remaining defect, shape of the defect and the proximity to free margins a melolabial flap was deemed most appropriate for complete closure of the defect.  Using a sterile surgical marker, an appropriate advancement flap was drawn incorporating the defect and placing the expected incisions within the relaxed skin tension lines where possible. The area thus outlined was incised deep to adipose tissue with a #15 scalpel blade. The skin margins were undermined to an appropriate distance in all directions utilizing iris scissors and carried over to close the primary defect. Complex Repair And Rotation Flap Text: The defect edges were debeveled with a #15 scalpel blade.  The primary defect was closed partially with a complex linear closure.  Given the location of the remaining defect, shape of the defect and the proximity to free margins a rotation flap was deemed most appropriate for complete closure of the defect.  Using a sterile surgical marker, an appropriate advancement flap was drawn incorporating the defect and placing the expected incisions within the relaxed skin tension lines where possible. The area thus outlined was incised deep to adipose tissue with a #15 scalpel blade. The skin margins were undermined to an appropriate distance in all directions utilizing iris scissors and carried over to close the primary defect. Complex Repair And Rhombic Flap Text: The defect edges were debeveled with a #15 scalpel blade.  The primary defect was closed partially with a complex linear closure.  Given the location of the remaining defect, shape of the defect and the proximity to free margins a rhombic flap was deemed most appropriate for complete closure of the defect.  Using a sterile surgical marker, an appropriate advancement flap was drawn incorporating the defect and placing the expected incisions within the relaxed skin tension lines where possible. The area thus outlined was incised deep to adipose tissue with a #15 scalpel blade. The skin margins were undermined to an appropriate distance in all directions utilizing iris scissors and carried over to close the primary defect. Complex Repair And Transposition Flap Text: The defect edges were debeveled with a #15 scalpel blade.  The primary defect was closed partially with a complex linear closure.  Given the location of the remaining defect, shape of the defect and the proximity to free margins a transposition flap was deemed most appropriate for complete closure of the defect.  Using a sterile surgical marker, an appropriate advancement flap was drawn incorporating the defect and placing the expected incisions within the relaxed skin tension lines where possible. The area thus outlined was incised deep to adipose tissue with a #15 scalpel blade. The skin margins were undermined to an appropriate distance in all directions utilizing iris scissors and carried over to close the primary defect. Complex Repair And V-Y Plasty Text: The defect edges were debeveled with a #15 scalpel blade.  The primary defect was closed partially with a complex linear closure.  Given the location of the remaining defect, shape of the defect and the proximity to free margins a V-Y plasty was deemed most appropriate for complete closure of the defect.  Using a sterile surgical marker, an appropriate advancement flap was drawn incorporating the defect and placing the expected incisions within the relaxed skin tension lines where possible. The area thus outlined was incised deep to adipose tissue with a #15 scalpel blade. The skin margins were undermined to an appropriate distance in all directions utilizing iris scissors and carried over to close the primary defect. Complex Repair And M Plasty Text: The defect edges were debeveled with a #15 scalpel blade.  The primary defect was closed partially with a complex linear closure.  Given the location of the remaining defect, shape of the defect and the proximity to free margins an M plasty was deemed most appropriate for complete closure of the defect.  Using a sterile surgical marker, an appropriate advancement flap was drawn incorporating the defect and placing the expected incisions within the relaxed skin tension lines where possible. The area thus outlined was incised deep to adipose tissue with a #15 scalpel blade. The skin margins were undermined to an appropriate distance in all directions utilizing iris scissors and carried over to close the primary defect. Complex Repair And Double M Plasty Text: The defect edges were debeveled with a #15 scalpel blade.  The primary defect was closed partially with a complex linear closure.  Given the location of the remaining defect, shape of the defect and the proximity to free margins a double M plasty was deemed most appropriate for complete closure of the defect.  Using a sterile surgical marker, an appropriate advancement flap was drawn incorporating the defect and placing the expected incisions within the relaxed skin tension lines where possible. The area thus outlined was incised deep to adipose tissue with a #15 scalpel blade. The skin margins were undermined to an appropriate distance in all directions utilizing iris scissors and carried over to close the primary defect. Complex Repair And W Plasty Text: The defect edges were debeveled with a #15 scalpel blade.  The primary defect was closed partially with a complex linear closure.  Given the location of the remaining defect, shape of the defect and the proximity to free margins a W plasty was deemed most appropriate for complete closure of the defect.  Using a sterile surgical marker, an appropriate advancement flap was drawn incorporating the defect and placing the expected incisions within the relaxed skin tension lines where possible. The area thus outlined was incised deep to adipose tissue with a #15 scalpel blade. The skin margins were undermined to an appropriate distance in all directions utilizing iris scissors and carried over to close the primary defect. Complex Repair And Z Plasty Text: The defect edges were debeveled with a #15 scalpel blade.  The primary defect was closed partially with a complex linear closure.  Given the location of the remaining defect, shape of the defect and the proximity to free margins a Z plasty was deemed most appropriate for complete closure of the defect.  Using a sterile surgical marker, an appropriate advancement flap was drawn incorporating the defect and placing the expected incisions within the relaxed skin tension lines where possible. The area thus outlined was incised deep to adipose tissue with a #15 scalpel blade. The skin margins were undermined to an appropriate distance in all directions utilizing iris scissors and carried over to close the primary defect. Complex Repair And Dorsal Nasal Flap Text: The defect edges were debeveled with a #15 scalpel blade.  The primary defect was closed partially with a complex linear closure.  Given the location of the remaining defect, shape of the defect and the proximity to free margins a dorsal nasal flap was deemed most appropriate for complete closure of the defect.  Using a sterile surgical marker, an appropriate flap was drawn incorporating the defect and placing the expected incisions within the relaxed skin tension lines where possible. The area thus outlined was incised deep to adipose tissue with a #15 scalpel blade. The skin margins were undermined to an appropriate distance in all directions utilizing iris scissors and carried over to close the primary defect. Complex Repair And Ftsg Text: The defect edges were debeveled with a #15 scalpel blade.  The primary defect was closed partially with a complex linear closure.  Given the location of the defect, shape of the defect and the proximity to free margins a full thickness skin graft was deemed most appropriate to repair the remaining defect.  The graft was trimmed to fit the size of the remaining defect.  The graft was then placed in the primary defect, oriented appropriately, and sutured into place. Complex Repair And Burow's Graft Text: The defect edges were debeveled with a #15 scalpel blade.  The primary defect was closed partially with a complex linear closure.  Given the location of the defect, shape of the defect, the proximity to free margins and the presence of a standing cone deformity a Burow's graft was deemed most appropriate to repair the remaining defect.  The graft was trimmed to fit the size of the remaining defect.  The graft was then placed in the primary defect, oriented appropriately, and sutured into place. Complex Repair And Split-Thickness Skin Graft Text: The defect edges were debeveled with a #15 scalpel blade.  The primary defect was closed partially with a complex linear closure.  Given the location of the defect, shape of the defect and the proximity to free margins a split thickness skin graft was deemed most appropriate to repair the remaining defect.  The graft was trimmed to fit the size of the remaining defect.  The graft was then placed in the primary defect, oriented appropriately, and sutured into place. Complex Repair And Epidermal Autograft Text: The defect edges were debeveled with a #15 scalpel blade.  The primary defect was closed partially with a complex linear closure.  Given the location of the defect, shape of the defect and the proximity to free margins an epidermal autograft was deemed most appropriate to repair the remaining defect.  The graft was trimmed to fit the size of the remaining defect.  The graft was then placed in the primary defect, oriented appropriately, and sutured into place. Complex Repair And Dermal Autograft Text: The defect edges were debeveled with a #15 scalpel blade.  The primary defect was closed partially with a complex linear closure.  Given the location of the defect, shape of the defect and the proximity to free margins an dermal autograft was deemed most appropriate to repair the remaining defect.  The graft was trimmed to fit the size of the remaining defect.  The graft was then placed in the primary defect, oriented appropriately, and sutured into place. Complex Repair And Tissue Cultured Epidermal Autograft Text: The defect edges were debeveled with a #15 scalpel blade.  The primary defect was closed partially with a complex linear closure.  Given the location of the defect, shape of the defect and the proximity to free margins an tissue cultured epidermal autograft was deemed most appropriate to repair the remaining defect.  The graft was trimmed to fit the size of the remaining defect.  The graft was then placed in the primary defect, oriented appropriately, and sutured into place. Complex Repair And Xenograft Text: The defect edges were debeveled with a #15 scalpel blade.  The primary defect was closed partially with a complex linear closure.  Given the location of the defect, shape of the defect and the proximity to free margins a xenograft was deemed most appropriate to repair the remaining defect.  The graft was trimmed to fit the size of the remaining defect.  The graft was then placed in the primary defect, oriented appropriately, and sutured into place. Complex Repair And Skin Substitute Graft Text: The defect edges were debeveled with a #15 scalpel blade.  The primary defect was closed partially with a complex linear closure.  Given the location of the remaining defect, shape of the defect and the proximity to free margins a skin substitute graft was deemed most appropriate to repair the remaining defect.  The graft was trimmed to fit the size of the remaining defect.  The graft was then placed in the primary defect, oriented appropriately, and sutured into place. Include Anticoagulation In Mohs Note?: Please Select the Appropriate Response Path Notes (To The Dermatopathologist): Please check margins. Consent was obtained from the patient. The risks and benefits to therapy were discussed in detail. Specifically, the risks of infection, scarring, bleeding, prolonged wound healing, incomplete removal, allergy to anesthesia, nerve injury and recurrence were addressed. Prior to the procedure, the treatment site was clearly identified and confirmed by the patient. All components of Universal Protocol/PAUSE Rule completed. Render Post-Care Instructions In Note?: yes Post-Care Instructions: I reviewed with the patient in detail post-care instructions. Patient is not to engage in any heavy lifting, exercise, or swimming for the next 14 days. Should the patient develop any fevers, chills, bleeding, severe pain patient will contact the office immediately. Home Suture Removal Text: Patient was provided a home suture removal kit and will remove their sutures at home.  If they have any questions or difficulties they will call the office. Where Do You Want The Question To Include Opioid Counseling Located?: Case Summary Tab Information: Selecting Yes will display possible errors in your note based on the variables you have selected. This validation is only offered as a suggestion for you. PLEASE NOTE THAT THE VALIDATION TEXT WILL BE REMOVED WHEN YOU FINALIZE YOUR NOTE. IF YOU WANT TO FAX A PRELIMINARY NOTE YOU WILL NEED TO TOGGLE THIS TO 'NO' IF YOU DO NOT WANT IT IN YOUR FAXED NOTE.

## 2025-06-26 NOTE — ED PROVIDER NOTE - SKIN [+], MLM
Pt has been complaining of constipation and requesting for a fleet enema. Pt stated, "It's at the tip and I can't push. It's hurting my back." Suggested mineral oil for pt's constipation.
RASH/ITCH